# Patient Record
Sex: FEMALE | Race: WHITE | NOT HISPANIC OR LATINO | Employment: OTHER | URBAN - METROPOLITAN AREA
[De-identification: names, ages, dates, MRNs, and addresses within clinical notes are randomized per-mention and may not be internally consistent; named-entity substitution may affect disease eponyms.]

---

## 2019-09-12 ENCOUNTER — APPOINTMENT (OUTPATIENT)
Dept: WOMENS IMAGING | Facility: HOSPITAL | Age: 49
End: 2019-09-12

## 2019-09-12 PROCEDURE — 77063 BREAST TOMOSYNTHESIS BI: CPT | Performed by: RADIOLOGY

## 2019-09-12 PROCEDURE — 77067 SCR MAMMO BI INCL CAD: CPT | Performed by: RADIOLOGY

## 2019-09-26 ENCOUNTER — APPOINTMENT (OUTPATIENT)
Dept: WOMENS IMAGING | Facility: HOSPITAL | Age: 49
End: 2019-09-26

## 2019-09-26 PROCEDURE — 77061 BREAST TOMOSYNTHESIS UNI: CPT | Performed by: RADIOLOGY

## 2019-09-26 PROCEDURE — 76641 ULTRASOUND BREAST COMPLETE: CPT | Performed by: RADIOLOGY

## 2019-09-26 PROCEDURE — 77065 DX MAMMO INCL CAD UNI: CPT | Performed by: RADIOLOGY

## 2019-11-04 ENCOUNTER — APPOINTMENT (OUTPATIENT)
Dept: PREADMISSION TESTING | Facility: HOSPITAL | Age: 49
End: 2019-11-04

## 2019-11-04 VITALS
OXYGEN SATURATION: 97 % | SYSTOLIC BLOOD PRESSURE: 165 MMHG | RESPIRATION RATE: 16 BRPM | BODY MASS INDEX: 25.4 KG/M2 | HEART RATE: 100 BPM | DIASTOLIC BLOOD PRESSURE: 104 MMHG | HEIGHT: 62 IN | TEMPERATURE: 98.2 F | WEIGHT: 138 LBS

## 2019-11-04 LAB
ABO GROUP BLD: NORMAL
ANION GAP SERPL CALCULATED.3IONS-SCNC: 16 MMOL/L (ref 5–15)
B-HCG UR QL: NEGATIVE
BASOPHILS # BLD AUTO: 0.03 10*3/MM3 (ref 0–0.2)
BASOPHILS NFR BLD AUTO: 0.3 % (ref 0–1.5)
BLD GP AB SCN SERPL QL: NEGATIVE
BUN BLD-MCNC: 10 MG/DL (ref 6–20)
BUN/CREAT SERPL: 15.2 (ref 7–25)
CALCIUM SPEC-SCNC: 9 MG/DL (ref 8.6–10.5)
CHLORIDE SERPL-SCNC: 103 MMOL/L (ref 98–107)
CO2 SERPL-SCNC: 24 MMOL/L (ref 22–29)
CREAT BLD-MCNC: 0.66 MG/DL (ref 0.57–1)
DEPRECATED RDW RBC AUTO: 45 FL (ref 37–54)
EOSINOPHIL # BLD AUTO: 0.2 10*3/MM3 (ref 0–0.4)
EOSINOPHIL NFR BLD AUTO: 2.2 % (ref 0.3–6.2)
ERYTHROCYTE [DISTWIDTH] IN BLOOD BY AUTOMATED COUNT: 12.6 % (ref 12.3–15.4)
GFR SERPL CREATININE-BSD FRML MDRD: 95 ML/MIN/1.73
GLUCOSE BLD-MCNC: 96 MG/DL (ref 65–99)
HCT VFR BLD AUTO: 43 % (ref 34–46.6)
HGB BLD-MCNC: 14.4 G/DL (ref 12–15.9)
IMM GRANULOCYTES # BLD AUTO: 0.03 10*3/MM3 (ref 0–0.05)
IMM GRANULOCYTES NFR BLD AUTO: 0.3 % (ref 0–0.5)
LYMPHOCYTES # BLD AUTO: 2.43 10*3/MM3 (ref 0.7–3.1)
LYMPHOCYTES NFR BLD AUTO: 26.7 % (ref 19.6–45.3)
MCH RBC QN AUTO: 32.4 PG (ref 26.6–33)
MCHC RBC AUTO-ENTMCNC: 33.5 G/DL (ref 31.5–35.7)
MCV RBC AUTO: 96.6 FL (ref 79–97)
MONOCYTES # BLD AUTO: 0.54 10*3/MM3 (ref 0.1–0.9)
MONOCYTES NFR BLD AUTO: 5.9 % (ref 5–12)
NEUTROPHILS # BLD AUTO: 5.88 10*3/MM3 (ref 1.7–7)
NEUTROPHILS NFR BLD AUTO: 64.6 % (ref 42.7–76)
NRBC BLD AUTO-RTO: 0 /100 WBC (ref 0–0.2)
PLATELET # BLD AUTO: 281 10*3/MM3 (ref 140–450)
PMV BLD AUTO: 10 FL (ref 6–12)
POTASSIUM BLD-SCNC: 4 MMOL/L (ref 3.5–5.2)
RBC # BLD AUTO: 4.45 10*6/MM3 (ref 3.77–5.28)
RH BLD: POSITIVE
SODIUM BLD-SCNC: 143 MMOL/L (ref 136–145)
T&S EXPIRATION DATE: NORMAL
WBC NRBC COR # BLD: 9.11 10*3/MM3 (ref 3.4–10.8)

## 2019-11-04 PROCEDURE — 36415 COLL VENOUS BLD VENIPUNCTURE: CPT

## 2019-11-04 PROCEDURE — 93005 ELECTROCARDIOGRAM TRACING: CPT

## 2019-11-04 PROCEDURE — 93010 ELECTROCARDIOGRAM REPORT: CPT | Performed by: INTERNAL MEDICINE

## 2019-11-04 PROCEDURE — 86850 RBC ANTIBODY SCREEN: CPT | Performed by: OBSTETRICS & GYNECOLOGY

## 2019-11-04 PROCEDURE — 80048 BASIC METABOLIC PNL TOTAL CA: CPT | Performed by: OBSTETRICS & GYNECOLOGY

## 2019-11-04 PROCEDURE — 86901 BLOOD TYPING SEROLOGIC RH(D): CPT | Performed by: OBSTETRICS & GYNECOLOGY

## 2019-11-04 PROCEDURE — 86900 BLOOD TYPING SEROLOGIC ABO: CPT | Performed by: OBSTETRICS & GYNECOLOGY

## 2019-11-04 PROCEDURE — 85025 COMPLETE CBC W/AUTO DIFF WBC: CPT | Performed by: OBSTETRICS & GYNECOLOGY

## 2019-11-04 PROCEDURE — 81025 URINE PREGNANCY TEST: CPT | Performed by: OBSTETRICS & GYNECOLOGY

## 2019-11-04 RX ORDER — ACETAMINOPHEN 500 MG
500-1000 TABLET ORAL EVERY 6 HOURS PRN
COMMUNITY
End: 2019-11-12 | Stop reason: HOSPADM

## 2019-11-04 RX ORDER — IBUPROFEN 200 MG
200-400 TABLET ORAL EVERY 6 HOURS PRN
COMMUNITY
End: 2020-04-24 | Stop reason: ALTCHOICE

## 2019-11-04 NOTE — DISCHARGE INSTRUCTIONS
Take the following medications the morning of surgery with a small sip of water: NONE        General Instructions:  • Do not eat solid food after midnight the night before surgery.  • You may drink clear liquids day of surgery but must stop at least one hour before your hospital arrival time.  • It is beneficial for you to have a clear drink that contains carbohydrates the day of surgery.  We suggest a 12 to 20 ounce bottle of Gatorade or Powerade for non-diabetic patients or a 12 to 20 ounce bottle of G2 or Powerade Zero for diabetic patients.     Clear liquids are liquids you can see through.  Nothing red in color.     Plain water                               Sports drinks  Sodas                                   Gelatin (Jell-O)  Fruit juices without pulp such as white grape juice and apple juice  Popsicles that contain no fruit or yogurt  Tea or coffee (no cream or milk added)  Gatorade / Powerade  G2 / Powerade Zero    • Bring any papers given to you in the doctor’s office.  • Wear clean comfortable clothes.  • Do not wear contact lenses, false eyelashes or make-up.  Bring a case for your glasses.   • Remove all piercings.  Leave jewelry and any other valuables at home.  • The Pre-Admission Testing nurse will instruct you to bring medications if unable to obtain an accurate list in Pre-Admission Testing.        If you were given a blood bank ID arm band remember to bring it with you the day of surgery.    Preventing a Surgical Site Infection:  • For 2 to 3 days before surgery, avoid shaving with a razor because the razor can irritate skin and make it easier to develop an infection.    • Any areas of open skin can increase the risk of a post-operative wound infection by allowing bacteria to enter and travel throughout the body.  Notify your surgeon if you have any skin wounds / rashes even if it is not near the expected surgical site.  The area will need assessed to determine if surgery should be delayed until  it is healed.  • The night prior to surgery sleep in a clean bed with clean clothing.  Do not allow pets to sleep with you.  • Shower on the morning of surgery using a fresh bar of anti-bacterial soap (such as Dial) and clean washcloth.  Dry with a clean towel and dress in clean clothing.  • Ask your surgeon if you will be receiving antibiotics prior to surgery.  • Make sure you, your family, and all healthcare providers clean their hands with soap and water or an alcohol based hand  before caring for you or your wound.    Day of surgery: 11/11/2019. MAIN OR. ARRIVAL TIME 530 AM  Your arrival time is approximately two hours before your scheduled surgery time.  Upon arrival, a Pre-op nurse and Anesthesiologist will review your health history, obtain vital signs, and answer questions you may have.  The only belongings needed at this time will be a list of your home medications and if applicable your C-PAP/BI-PAP machine.  If you are staying overnight your family can leave the rest of your belongings in the car and bring them to your room later.  A Pre-op nurse will start an IV and you may receive medication in preparation for surgery, including something to help you relax.  Your family will be able to see you in the Pre-op area.  Two visitors at a time will be allowed in the Pre-op room.  While you are in surgery your family should notify the waiting room  if they leave the waiting room area and provide a contact phone number.    Please be aware that surgery does come with discomfort.  We want to make every effort to control your discomfort so please discuss any uncontrolled symptoms with your nurse.   Your doctor will most likely have prescribed pain medications.          If you are staying overnight following surgery, you will be transported to your hospital room following the recovery period.  [facility] has all private rooms.    You have received a list of surgical assistants  for your reference.  If you have any questions please call Pre-Admission Testing at [phone].  Deductibles and co-payments are collected on the day of service. Please be prepared to pay the required co-pay, deductible or deposit on the day of service as defined by your plan.

## 2019-11-08 RX ORDER — PHENAZOPYRIDINE HYDROCHLORIDE 200 MG/1
200 TABLET, FILM COATED ORAL
Status: DISCONTINUED | OUTPATIENT
Start: 2019-11-08 | End: 2019-11-12 | Stop reason: HOSPADM

## 2019-11-08 RX ORDER — CEFAZOLIN SODIUM 2 G/100ML
2 INJECTION, SOLUTION INTRAVENOUS
Status: COMPLETED | OUTPATIENT
Start: 2019-11-11 | End: 2019-11-11

## 2019-11-08 RX ORDER — ACETAMINOPHEN 500 MG
1000 TABLET ORAL
Status: COMPLETED | OUTPATIENT
Start: 2019-11-08 | End: 2019-11-11

## 2019-11-08 RX ORDER — GABAPENTIN 300 MG/1
600 CAPSULE ORAL
Status: COMPLETED | OUTPATIENT
Start: 2019-11-08 | End: 2019-11-11

## 2019-11-08 RX ORDER — CELECOXIB 200 MG/1
400 CAPSULE ORAL
Status: COMPLETED | OUTPATIENT
Start: 2019-11-08 | End: 2019-11-11

## 2019-11-08 NOTE — H&P
Macie is admitted for a LAVH with bilateral salpingectomy.  She is a 48-year-old woman with a history of very heavy periods with dysmenorrhea.  She will flow through her tampons.  She also has constipation.  Her uterus is enlarged at 10 weeks size.  She is admitted for surgery.  She has a lot of pressure in her pelvis and would prefer a hysterectomy as opposed to ablation or any other more conservative therapies.  She has been having fatigue.    Past medical history is significant for GERD and hypothyroidism.    Allergies none    Social history she does not smoke she socially uses alcohol only, no history of drug use.  She is single.  She works outside the home.    Medications in P thyroid 60 mg daily, magnesium daily, vitamin D, DHEA,    Family history negative for pertinent family history    Pregnancy history  1 para 1 vaginal delivery 1.    Past medical history is negative.    Review of systems is significant for heavy menses, her cycles are regular, dysmenorrhea with her.    On physical exam weight 142.  Height 5 foot three-quarter inch, BMI is 27.05.  Her blood pressure is 120/82.    Head eyes ears nose and throat are normal    Lungs clear with normal lung bases    Heart regular rate and rhythm with no extra sounds or murmurs    Abdomen soft flat nontender there are no palpable masses.    Vulva is normal.  Cervix is pink.  On bimanual uterus is 10 weeks in size.  It is mobile.  Her rectal exam is negative.    Assessment 10 weeks size uterus.  There are multiple fibroids.  The total uterine weight is estimated 215 g.  She is been having a lot of pelvic pressure and has to void more frequently.  She would like to proceed with a LAVH and bilateral salpingectomy.  The risk of surgery including infection bleeding, damage to bowel or bladder, the 4% risk of conversion to a laparotomy for hysterectomy completion was reviewed.  The risk of DVT was also reviewed.  All questions have been answered and she would  like to proceed with the surgery.

## 2019-11-11 ENCOUNTER — ANESTHESIA (OUTPATIENT)
Dept: PERIOP | Facility: HOSPITAL | Age: 49
End: 2019-11-11

## 2019-11-11 ENCOUNTER — ANESTHESIA EVENT (OUTPATIENT)
Dept: PERIOP | Facility: HOSPITAL | Age: 49
End: 2019-11-11

## 2019-11-11 ENCOUNTER — HOSPITAL ENCOUNTER (OUTPATIENT)
Facility: HOSPITAL | Age: 49
Discharge: HOME | End: 2019-11-12
Attending: OBSTETRICS & GYNECOLOGY | Admitting: OBSTETRICS & GYNECOLOGY

## 2019-11-11 DIAGNOSIS — D21.9 FIBROIDS: ICD-10-CM

## 2019-11-11 PROBLEM — N92.0 MENORRHAGIA WITH REGULAR CYCLE: Status: ACTIVE | Noted: 2019-11-11

## 2019-11-11 LAB
B-HCG UR QL: NEGATIVE
INTERNAL NEGATIVE CONTROL: NEGATIVE
INTERNAL POSITIVE CONTROL: POSITIVE
Lab: NORMAL

## 2019-11-11 PROCEDURE — 25010000002 MIDAZOLAM PER 1 MG: Performed by: ANESTHESIOLOGY

## 2019-11-11 PROCEDURE — 25010000002 ONDANSETRON PER 1 MG: Performed by: NURSE ANESTHETIST, CERTIFIED REGISTERED

## 2019-11-11 PROCEDURE — 25010000002 NEOSTIGMINE PER 0.5 MG: Performed by: NURSE ANESTHETIST, CERTIFIED REGISTERED

## 2019-11-11 PROCEDURE — 25010000002 ROPIVACAINE PER 1 MG: Performed by: OBSTETRICS & GYNECOLOGY

## 2019-11-11 PROCEDURE — 25010000002 DEXAMETHASONE PER 1 MG: Performed by: NURSE ANESTHETIST, CERTIFIED REGISTERED

## 2019-11-11 PROCEDURE — 25010000002 PROPOFOL 10 MG/ML EMULSION: Performed by: NURSE ANESTHETIST, CERTIFIED REGISTERED

## 2019-11-11 PROCEDURE — 88307 TISSUE EXAM BY PATHOLOGIST: CPT | Performed by: OBSTETRICS & GYNECOLOGY

## 2019-11-11 PROCEDURE — 81025 URINE PREGNANCY TEST: CPT | Performed by: ANESTHESIOLOGY

## 2019-11-11 PROCEDURE — 25010000003 CEFAZOLIN IN DEXTROSE 2-4 GM/100ML-% SOLUTION: Performed by: OBSTETRICS & GYNECOLOGY

## 2019-11-11 PROCEDURE — 25010000002 FENTANYL CITRATE (PF) 100 MCG/2ML SOLUTION: Performed by: NURSE ANESTHETIST, CERTIFIED REGISTERED

## 2019-11-11 RX ORDER — SODIUM CHLORIDE, SODIUM LACTATE, POTASSIUM CHLORIDE, CALCIUM CHLORIDE 600; 310; 30; 20 MG/100ML; MG/100ML; MG/100ML; MG/100ML
40 INJECTION, SOLUTION INTRAVENOUS CONTINUOUS
Status: DISCONTINUED | OUTPATIENT
Start: 2019-11-11 | End: 2019-11-12 | Stop reason: HOSPADM

## 2019-11-11 RX ORDER — PROMETHAZINE HYDROCHLORIDE 25 MG/1
12.5 SUPPOSITORY RECTAL EVERY 6 HOURS PRN
Status: DISCONTINUED | OUTPATIENT
Start: 2019-11-11 | End: 2019-11-12 | Stop reason: HOSPADM

## 2019-11-11 RX ORDER — LEVOTHYROXINE AND LIOTHYRONINE 38; 9 UG/1; UG/1
60 TABLET ORAL DAILY
COMMUNITY
End: 2019-11-25 | Stop reason: SDUPTHER

## 2019-11-11 RX ORDER — ONDANSETRON 2 MG/ML
INJECTION INTRAMUSCULAR; INTRAVENOUS AS NEEDED
Status: DISCONTINUED | OUTPATIENT
Start: 2019-11-11 | End: 2019-11-11 | Stop reason: SURG

## 2019-11-11 RX ORDER — FLUMAZENIL 0.1 MG/ML
0.2 INJECTION INTRAVENOUS AS NEEDED
Status: DISCONTINUED | OUTPATIENT
Start: 2019-11-11 | End: 2019-11-11 | Stop reason: HOSPADM

## 2019-11-11 RX ORDER — HYDROCODONE BITARTRATE AND ACETAMINOPHEN 7.5; 325 MG/1; MG/1
1 TABLET ORAL ONCE AS NEEDED
Status: DISCONTINUED | OUTPATIENT
Start: 2019-11-11 | End: 2019-11-11 | Stop reason: HOSPADM

## 2019-11-11 RX ORDER — ONDANSETRON 4 MG/1
4 TABLET, FILM COATED ORAL EVERY 6 HOURS PRN
Status: DISCONTINUED | OUTPATIENT
Start: 2019-11-11 | End: 2019-11-12 | Stop reason: HOSPADM

## 2019-11-11 RX ORDER — MIDAZOLAM HYDROCHLORIDE 1 MG/ML
1 INJECTION INTRAMUSCULAR; INTRAVENOUS
Status: DISCONTINUED | OUTPATIENT
Start: 2019-11-11 | End: 2019-11-11 | Stop reason: HOSPADM

## 2019-11-11 RX ORDER — FAMOTIDINE 10 MG/ML
20 INJECTION, SOLUTION INTRAVENOUS ONCE
Status: COMPLETED | OUTPATIENT
Start: 2019-11-11 | End: 2019-11-11

## 2019-11-11 RX ORDER — MAGNESIUM HYDROXIDE 1200 MG/15ML
LIQUID ORAL AS NEEDED
Status: DISCONTINUED | OUTPATIENT
Start: 2019-11-11 | End: 2019-11-11 | Stop reason: HOSPADM

## 2019-11-11 RX ORDER — OXYCODONE AND ACETAMINOPHEN 7.5; 325 MG/1; MG/1
1 TABLET ORAL ONCE AS NEEDED
Status: DISCONTINUED | OUTPATIENT
Start: 2019-11-11 | End: 2019-11-11 | Stop reason: HOSPADM

## 2019-11-11 RX ORDER — PROMETHAZINE HYDROCHLORIDE 25 MG/1
25 SUPPOSITORY RECTAL ONCE AS NEEDED
Status: DISCONTINUED | OUTPATIENT
Start: 2019-11-11 | End: 2019-11-11 | Stop reason: HOSPADM

## 2019-11-11 RX ORDER — ONDANSETRON 2 MG/ML
4 INJECTION INTRAMUSCULAR; INTRAVENOUS ONCE AS NEEDED
Status: DISCONTINUED | OUTPATIENT
Start: 2019-11-11 | End: 2019-11-11 | Stop reason: HOSPADM

## 2019-11-11 RX ORDER — PROPOFOL 10 MG/ML
VIAL (ML) INTRAVENOUS AS NEEDED
Status: DISCONTINUED | OUTPATIENT
Start: 2019-11-11 | End: 2019-11-11 | Stop reason: SURG

## 2019-11-11 RX ORDER — MIDAZOLAM HYDROCHLORIDE 1 MG/ML
2 INJECTION INTRAMUSCULAR; INTRAVENOUS
Status: DISCONTINUED | OUTPATIENT
Start: 2019-11-11 | End: 2019-11-11 | Stop reason: HOSPADM

## 2019-11-11 RX ORDER — EPHEDRINE SULFATE 50 MG/ML
INJECTION, SOLUTION INTRAVENOUS AS NEEDED
Status: DISCONTINUED | OUTPATIENT
Start: 2019-11-11 | End: 2019-11-11 | Stop reason: SURG

## 2019-11-11 RX ORDER — ONDANSETRON 2 MG/ML
4 INJECTION INTRAMUSCULAR; INTRAVENOUS EVERY 6 HOURS PRN
Status: DISCONTINUED | OUTPATIENT
Start: 2019-11-11 | End: 2019-11-12 | Stop reason: HOSPADM

## 2019-11-11 RX ORDER — ROCURONIUM BROMIDE 10 MG/ML
INJECTION, SOLUTION INTRAVENOUS AS NEEDED
Status: DISCONTINUED | OUTPATIENT
Start: 2019-11-11 | End: 2019-11-11 | Stop reason: SURG

## 2019-11-11 RX ORDER — LABETALOL HYDROCHLORIDE 5 MG/ML
5 INJECTION, SOLUTION INTRAVENOUS
Status: DISCONTINUED | OUTPATIENT
Start: 2019-11-11 | End: 2019-11-11 | Stop reason: HOSPADM

## 2019-11-11 RX ORDER — EPHEDRINE SULFATE 50 MG/ML
5 INJECTION, SOLUTION INTRAVENOUS ONCE AS NEEDED
Status: DISCONTINUED | OUTPATIENT
Start: 2019-11-11 | End: 2019-11-11 | Stop reason: HOSPADM

## 2019-11-11 RX ORDER — FENTANYL CITRATE 50 UG/ML
INJECTION, SOLUTION INTRAMUSCULAR; INTRAVENOUS AS NEEDED
Status: DISCONTINUED | OUTPATIENT
Start: 2019-11-11 | End: 2019-11-11 | Stop reason: SURG

## 2019-11-11 RX ORDER — CELECOXIB 200 MG/1
400 CAPSULE ORAL EVERY 12 HOURS SCHEDULED
Status: DISCONTINUED | OUTPATIENT
Start: 2019-11-11 | End: 2019-11-12

## 2019-11-11 RX ORDER — FENTANYL CITRATE 50 UG/ML
50 INJECTION, SOLUTION INTRAMUSCULAR; INTRAVENOUS
Status: DISCONTINUED | OUTPATIENT
Start: 2019-11-11 | End: 2019-11-11 | Stop reason: HOSPADM

## 2019-11-11 RX ORDER — LIDOCAINE HYDROCHLORIDE AND EPINEPHRINE 10; 10 MG/ML; UG/ML
INJECTION, SOLUTION INFILTRATION; PERINEURAL AS NEEDED
Status: DISCONTINUED | OUTPATIENT
Start: 2019-11-11 | End: 2019-11-11 | Stop reason: HOSPADM

## 2019-11-11 RX ORDER — LIDOCAINE HYDROCHLORIDE 10 MG/ML
0.5 INJECTION, SOLUTION EPIDURAL; INFILTRATION; INTRACAUDAL; PERINEURAL ONCE AS NEEDED
Status: DISCONTINUED | OUTPATIENT
Start: 2019-11-11 | End: 2019-11-11 | Stop reason: HOSPADM

## 2019-11-11 RX ORDER — PROMETHAZINE HYDROCHLORIDE 25 MG/1
25 TABLET ORAL EVERY 6 HOURS PRN
Status: DISCONTINUED | OUTPATIENT
Start: 2019-11-11 | End: 2019-11-12 | Stop reason: HOSPADM

## 2019-11-11 RX ORDER — NALOXONE HCL 0.4 MG/ML
0.2 VIAL (ML) INJECTION AS NEEDED
Status: DISCONTINUED | OUTPATIENT
Start: 2019-11-11 | End: 2019-11-11 | Stop reason: HOSPADM

## 2019-11-11 RX ORDER — DIPHENHYDRAMINE HCL 25 MG
25 CAPSULE ORAL
Status: DISCONTINUED | OUTPATIENT
Start: 2019-11-11 | End: 2019-11-11 | Stop reason: HOSPADM

## 2019-11-11 RX ORDER — SODIUM CHLORIDE 0.9 % (FLUSH) 0.9 %
3-10 SYRINGE (ML) INJECTION AS NEEDED
Status: DISCONTINUED | OUTPATIENT
Start: 2019-11-11 | End: 2019-11-11 | Stop reason: HOSPADM

## 2019-11-11 RX ORDER — SODIUM CHLORIDE 9 MG/ML
INJECTION, SOLUTION INTRAVENOUS AS NEEDED
Status: DISCONTINUED | OUTPATIENT
Start: 2019-11-11 | End: 2019-11-11 | Stop reason: HOSPADM

## 2019-11-11 RX ORDER — PROMETHAZINE HYDROCHLORIDE 25 MG/ML
25 INJECTION, SOLUTION INTRAMUSCULAR; INTRAVENOUS EVERY 4 HOURS PRN
Status: DISCONTINUED | OUTPATIENT
Start: 2019-11-11 | End: 2019-11-12 | Stop reason: HOSPADM

## 2019-11-11 RX ORDER — ROPIVACAINE HYDROCHLORIDE 5 MG/ML
INJECTION, SOLUTION EPIDURAL; INFILTRATION; PERINEURAL AS NEEDED
Status: DISCONTINUED | OUTPATIENT
Start: 2019-11-11 | End: 2019-11-11 | Stop reason: HOSPADM

## 2019-11-11 RX ORDER — OXYCODONE HYDROCHLORIDE 5 MG/1
5 TABLET ORAL EVERY 4 HOURS PRN
Status: DISCONTINUED | OUTPATIENT
Start: 2019-11-11 | End: 2019-11-12 | Stop reason: HOSPADM

## 2019-11-11 RX ORDER — ACETAMINOPHEN 325 MG/1
650 TABLET ORAL ONCE AS NEEDED
Status: DISCONTINUED | OUTPATIENT
Start: 2019-11-11 | End: 2019-11-11 | Stop reason: HOSPADM

## 2019-11-11 RX ORDER — GABAPENTIN 300 MG/1
600 CAPSULE ORAL 3 TIMES DAILY
Status: DISCONTINUED | OUTPATIENT
Start: 2019-11-11 | End: 2019-11-12 | Stop reason: HOSPADM

## 2019-11-11 RX ORDER — PROMETHAZINE HYDROCHLORIDE 25 MG/1
25 TABLET ORAL ONCE AS NEEDED
Status: DISCONTINUED | OUTPATIENT
Start: 2019-11-11 | End: 2019-11-11 | Stop reason: HOSPADM

## 2019-11-11 RX ORDER — SCOLOPAMINE TRANSDERMAL SYSTEM 1 MG/1
1 PATCH, EXTENDED RELEASE TRANSDERMAL
Status: DISCONTINUED | OUTPATIENT
Start: 2019-11-11 | End: 2019-11-11 | Stop reason: HOSPADM

## 2019-11-11 RX ORDER — DOCUSATE SODIUM 100 MG/1
100 CAPSULE, LIQUID FILLED ORAL 2 TIMES DAILY
Status: DISCONTINUED | OUTPATIENT
Start: 2019-11-11 | End: 2019-11-12 | Stop reason: SDUPTHER

## 2019-11-11 RX ORDER — NALOXONE HCL 0.4 MG/ML
0.4 VIAL (ML) INJECTION
Status: DISCONTINUED | OUTPATIENT
Start: 2019-11-11 | End: 2019-11-12 | Stop reason: HOSPADM

## 2019-11-11 RX ORDER — LIDOCAINE HYDROCHLORIDE 20 MG/ML
INJECTION, SOLUTION INFILTRATION; PERINEURAL AS NEEDED
Status: DISCONTINUED | OUTPATIENT
Start: 2019-11-11 | End: 2019-11-11 | Stop reason: SURG

## 2019-11-11 RX ORDER — HYDRALAZINE HYDROCHLORIDE 20 MG/ML
5 INJECTION INTRAMUSCULAR; INTRAVENOUS
Status: DISCONTINUED | OUTPATIENT
Start: 2019-11-11 | End: 2019-11-11 | Stop reason: HOSPADM

## 2019-11-11 RX ORDER — DIPHENHYDRAMINE HYDROCHLORIDE 50 MG/ML
12.5 INJECTION INTRAMUSCULAR; INTRAVENOUS
Status: DISCONTINUED | OUTPATIENT
Start: 2019-11-11 | End: 2019-11-11 | Stop reason: HOSPADM

## 2019-11-11 RX ORDER — SODIUM CHLORIDE, SODIUM LACTATE, POTASSIUM CHLORIDE, CALCIUM CHLORIDE 600; 310; 30; 20 MG/100ML; MG/100ML; MG/100ML; MG/100ML
9 INJECTION, SOLUTION INTRAVENOUS CONTINUOUS
Status: DISCONTINUED | OUTPATIENT
Start: 2019-11-11 | End: 2019-11-12 | Stop reason: HOSPADM

## 2019-11-11 RX ORDER — PROMETHAZINE HYDROCHLORIDE 25 MG/ML
12.5 INJECTION, SOLUTION INTRAMUSCULAR; INTRAVENOUS ONCE AS NEEDED
Status: DISCONTINUED | OUTPATIENT
Start: 2019-11-11 | End: 2019-11-11 | Stop reason: HOSPADM

## 2019-11-11 RX ORDER — DEXAMETHASONE SODIUM PHOSPHATE 10 MG/ML
INJECTION INTRAMUSCULAR; INTRAVENOUS AS NEEDED
Status: DISCONTINUED | OUTPATIENT
Start: 2019-11-11 | End: 2019-11-11 | Stop reason: SURG

## 2019-11-11 RX ORDER — ACETAMINOPHEN 500 MG
1000 TABLET ORAL 4 TIMES DAILY
Status: COMPLETED | OUTPATIENT
Start: 2019-11-11 | End: 2019-11-11

## 2019-11-11 RX ORDER — GLYCOPYRROLATE 0.2 MG/ML
INJECTION INTRAMUSCULAR; INTRAVENOUS AS NEEDED
Status: DISCONTINUED | OUTPATIENT
Start: 2019-11-11 | End: 2019-11-11 | Stop reason: SURG

## 2019-11-11 RX ORDER — SODIUM CHLORIDE 0.9 % (FLUSH) 0.9 %
3 SYRINGE (ML) INJECTION EVERY 12 HOURS SCHEDULED
Status: DISCONTINUED | OUTPATIENT
Start: 2019-11-11 | End: 2019-11-11 | Stop reason: HOSPADM

## 2019-11-11 RX ORDER — PROMETHAZINE HYDROCHLORIDE 25 MG/ML
6.25 INJECTION, SOLUTION INTRAMUSCULAR; INTRAVENOUS EVERY 4 HOURS PRN
Status: DISCONTINUED | OUTPATIENT
Start: 2019-11-11 | End: 2019-11-12 | Stop reason: HOSPADM

## 2019-11-11 RX ORDER — PROMETHAZINE HYDROCHLORIDE 25 MG/ML
6.25 INJECTION, SOLUTION INTRAMUSCULAR; INTRAVENOUS
Status: DISCONTINUED | OUTPATIENT
Start: 2019-11-11 | End: 2019-11-11 | Stop reason: HOSPADM

## 2019-11-11 RX ORDER — HYDROMORPHONE HYDROCHLORIDE 1 MG/ML
0.5 INJECTION, SOLUTION INTRAMUSCULAR; INTRAVENOUS; SUBCUTANEOUS
Status: DISCONTINUED | OUTPATIENT
Start: 2019-11-11 | End: 2019-11-11 | Stop reason: HOSPADM

## 2019-11-11 RX ORDER — HYDROMORPHONE HYDROCHLORIDE 1 MG/ML
0.5 INJECTION, SOLUTION INTRAMUSCULAR; INTRAVENOUS; SUBCUTANEOUS
Status: DISCONTINUED | OUTPATIENT
Start: 2019-11-11 | End: 2019-11-12 | Stop reason: HOSPADM

## 2019-11-11 RX ORDER — ZOLPIDEM TARTRATE 5 MG/1
5 TABLET ORAL NIGHTLY PRN
Status: DISCONTINUED | OUTPATIENT
Start: 2019-11-11 | End: 2019-11-12 | Stop reason: HOSPADM

## 2019-11-11 RX ADMIN — DOCUSATE SODIUM 100 MG: 100 CAPSULE, LIQUID FILLED ORAL at 20:42

## 2019-11-11 RX ADMIN — CEFAZOLIN SODIUM 2 G: 2 INJECTION, SOLUTION INTRAVENOUS at 07:42

## 2019-11-11 RX ADMIN — GABAPENTIN 600 MG: 300 CAPSULE ORAL at 06:28

## 2019-11-11 RX ADMIN — PROPOFOL 200 MG: 10 INJECTION, EMULSION INTRAVENOUS at 07:38

## 2019-11-11 RX ADMIN — CELECOXIB 400 MG: 200 CAPSULE ORAL at 06:28

## 2019-11-11 RX ADMIN — ROCURONIUM BROMIDE 50 MG: 10 INJECTION INTRAVENOUS at 07:38

## 2019-11-11 RX ADMIN — ACETAMINOPHEN 1000 MG: 500 TABLET, FILM COATED ORAL at 06:28

## 2019-11-11 RX ADMIN — FENTANYL CITRATE 100 MCG: 50 INJECTION, SOLUTION INTRAMUSCULAR; INTRAVENOUS at 07:38

## 2019-11-11 RX ADMIN — SODIUM CHLORIDE, POTASSIUM CHLORIDE, SODIUM LACTATE AND CALCIUM CHLORIDE 9 ML/HR: 600; 310; 30; 20 INJECTION, SOLUTION INTRAVENOUS at 06:52

## 2019-11-11 RX ADMIN — DEXAMETHASONE SODIUM PHOSPHATE 8 MG: 10 INJECTION INTRAMUSCULAR; INTRAVENOUS at 07:55

## 2019-11-11 RX ADMIN — FENTANYL CITRATE 50 MCG: 50 INJECTION, SOLUTION INTRAMUSCULAR; INTRAVENOUS at 08:15

## 2019-11-11 RX ADMIN — NEOSTIGMINE METHYLSULFATE 3 MG: 1 INJECTION INTRAMUSCULAR; INTRAVENOUS; SUBCUTANEOUS at 09:03

## 2019-11-11 RX ADMIN — MIDAZOLAM 1 MG: 1 INJECTION INTRAMUSCULAR; INTRAVENOUS at 06:51

## 2019-11-11 RX ADMIN — GABAPENTIN 600 MG: 300 CAPSULE ORAL at 13:02

## 2019-11-11 RX ADMIN — ONDANSETRON 4 MG: 2 INJECTION INTRAMUSCULAR; INTRAVENOUS at 08:16

## 2019-11-11 RX ADMIN — OXYCODONE HYDROCHLORIDE 5 MG: 5 TABLET ORAL at 18:28

## 2019-11-11 RX ADMIN — GABAPENTIN 600 MG: 300 CAPSULE ORAL at 20:42

## 2019-11-11 RX ADMIN — PHENAZOPYRIDINE 200 MG: 200 TABLET ORAL at 06:28

## 2019-11-11 RX ADMIN — GLYCOPYRROLATE 0.4 MG: 0.2 INJECTION INTRAMUSCULAR; INTRAVENOUS at 09:03

## 2019-11-11 RX ADMIN — CELECOXIB 400 MG: 200 CAPSULE ORAL at 20:42

## 2019-11-11 RX ADMIN — OXYCODONE HYDROCHLORIDE 5 MG: 5 TABLET ORAL at 22:35

## 2019-11-11 RX ADMIN — ACETAMINOPHEN 1000 MG: 500 TABLET, FILM COATED ORAL at 13:02

## 2019-11-11 RX ADMIN — SODIUM CHLORIDE, POTASSIUM CHLORIDE, SODIUM LACTATE AND CALCIUM CHLORIDE: 600; 310; 30; 20 INJECTION, SOLUTION INTRAVENOUS at 08:45

## 2019-11-11 RX ADMIN — EPHEDRINE SULFATE 20 MG: 50 INJECTION INTRAMUSCULAR; INTRAVENOUS; SUBCUTANEOUS at 08:05

## 2019-11-11 RX ADMIN — LIDOCAINE HYDROCHLORIDE 60 MG: 20 INJECTION, SOLUTION INFILTRATION; PERINEURAL at 07:38

## 2019-11-11 RX ADMIN — SCOPALAMINE 1 PATCH: 1 PATCH, EXTENDED RELEASE TRANSDERMAL at 06:51

## 2019-11-11 RX ADMIN — FENTANYL CITRATE 50 MCG: 50 INJECTION, SOLUTION INTRAMUSCULAR; INTRAVENOUS at 07:39

## 2019-11-11 RX ADMIN — FENTANYL CITRATE 50 MCG: 50 INJECTION, SOLUTION INTRAMUSCULAR; INTRAVENOUS at 08:33

## 2019-11-11 RX ADMIN — DOCUSATE SODIUM 100 MG: 100 CAPSULE, LIQUID FILLED ORAL at 13:02

## 2019-11-11 RX ADMIN — FAMOTIDINE 20 MG: 10 INJECTION INTRAVENOUS at 06:51

## 2019-11-11 NOTE — BRIEF OP NOTE
TOTAL LAPAROSCOPIC HYSTERECTOMY  Progress Note    Macie Sarabia  11/11/2019    Pre-op Diagnosis:   Fibroids and menorrhagia       Post-Op Diagnosis Codes:   same    Procedure/CPT® Codes:      Procedure(s): LAPAROSCOPIC assisted vaginal  HYSTERECTOMY BILATERAL SALPINGECTOMY and cystoscopy     Surgeon(s):  Amanda Santiago MD    Anesthesia: General    Staff:   Circulator: Ginger Gustafson RN  Scrub Person: Anabell Carr  Assistant: Maria Esther Barnes RN    Estimated Blood Loss: 100 mL    Urine Voided: * No values recorded between 11/11/2019  7:30 AM and 11/11/2019  9:04 AM *    Specimens:                Specimens     ID Source Type Tests Collected By Collected At Frozen?      A Uterus with Fallopian Tubes Tissue · TISSUE PATHOLOGY EXAM   Amanda Snatiago MD 11/11/19 0840 No     Description: uterus with fibroids and bilateral fallopian tubes                Drains:   Urethral Catheter Non-latex 16 Fr. (Active)       Findings: uterine fibroids    Complications: none      Amanda Santiago MD     Date: 11/11/2019  Time: 9:11 AM

## 2019-11-11 NOTE — ANESTHESIA PROCEDURE NOTES
Airway  Urgency: elective    Date/Time: 11/11/2019 7:41 AM  Airway not difficult    General Information and Staff    Patient location during procedure: OR  Anesthesiologist: Eduardo Rojas MD  CRNA: Kiara Powers CRNA    Indications and Patient Condition  Indications for airway management: airway protection    Preoxygenated: yes  Mask difficulty assessment: 1 - vent by mask    Final Airway Details  Final airway type: endotracheal airway      Successful airway: ETT  Cuffed: yes   Successful intubation technique: direct laryngoscopy  Facilitating devices/methods: intubating stylet  Endotracheal tube insertion site: oral  Blade: Deleon  Blade size: 2  ETT size (mm): 7.0  Cormack-Lehane Classification: grade I - full view of glottis  Placement verified by: chest auscultation and capnometry   Measured from: lips  ETT/EBT  to lips (cm): 20  Number of attempts at approach: 1  Assessment: lips, teeth, and gum same as pre-op and atraumatic intubation    Additional Comments  Atraumatic, MOP to cuff, BSBE, no change to dentition, secured with tape

## 2019-11-11 NOTE — OP NOTE
PREOPERATIVE DIAGNOSIS: Fibroids and menorrhagia    POSTOPERATIVE DIAGNOSIS: Uterine fibroids    PROCEDURE: LAVH with bilateral salpingectomy and cystoscopy    SURGEON:  Amanda Snatiago MD    SURGICAL ASSISTANT: Brunilda Barnes    FINDINGS: Uterine fibroids    COMPLICATIONS: None    ANESTHESIA: General    BLOOD LOSS: 100 cc    DESCRIPTION OF PROCEDURE: She was brought to the operating room and given general anesthesia.  She was placed in a modified lithotomy position using Hung stirrups.  She was prepped her bladder was emptied and then she was draped.  A speculum was placed in vagina single-tooth tenaculum placed on the cervix.  A Hulka manipulator was inserted.  The tenaculum was removed and the speculum were removed.  I changed my gloves and made a 5 mm incision just beneath the umbilicus after first injecting 5 cc of half percent Naropin.  The abdominal wall was elevated the 5 mm trocar with a 5 mm laparoscope was inserted under direct visualization.  I then remove the center of the trocar and left the sleeve in place.  She was insufflated from a pressure 3 to a pressure of 15 mmHg and placed in Trendelenburg.  The laparoscope was reinserted.  Two 5 mm incisions lateral to the epigastric vessels were made after first injecting half percent Naropin 10 cc and visualizing the needle tip on either side.  We were lateral to the epigastric vessels.  5 mm trochars were inserted into each side.  This was done under direct visualization.  The uterus had multiple small fibroids.  The right fallopian tube was excised with the harmonic on min along the mesosalpinx at the cornua the same was done on the opposite side and both fallopian tubes were removed.  Next the harmonic ConMed was used to clamped cut and coagulate the utero-ovarian ligament and round ligament and then on max the cardinal ligament first on the left side then on the right and then on max taking down the anterior peritoneum overlying the lower uterine  segment.  We then went vaginally.  A speculum was placed in vagina to tenaculum placed on the cervix after removing the Hulka manipulator.  I injected 20 cc of 1% lidocaine with epinephrine around the cervix and made an incision pushing the mucosa toward the patient's head.  The posterior peritoneum was entered without difficulty.  It was marked with a suture of 0 Monocryl and held with a hemostat.  A retractor was placed into the cul-de-sac.  The uterosacral ligaments were clamped on either side the pedicle developed and ligated in Guille fashion with 0 Monocryl.  Next the anterior leaf of the peritoneum was incised and the peritoneal cavity was entered freeing the bladder from the lower uterine segment.  A Kimmie retractor was inserted.  Progressively the cardinal ligament was clamped on either side pedicle developed and ligated in Guille fashion with 0 Monocryl.  The uterus was then removed.  Areas of bleeding on the sidewall of the vaginal vault were sutured on either side with a figure-of-eight suture of 0 Monocryl.  Next reperitonealization was done using 0 Vicryl in a running fashion and the sponge and needle count were correct.  The vaginal vault was closed with a running lock suture of 0 Vicryl there was good hemostasis.  We then revisualized from above and there was no evidence of bleeding.  The gas was allowed to escape the ureters have been identified.  They were free from the operative site.  The gas was allowed to escape.  The 3 trochars were removed and 4-0 Vicryl was used to close the skin in subcuticular fashion and Dermabond applied.  A cystoscopy was performed and urine was seen to effluxed from both of the ureters.  Pyridium was in place.  I did not see any damage to the bladder mucosa using the to visualize this as I looked at the ureters effluxing urine that was Pyridium stained.  Next a Loaiza catheter was reinserted.  She left the operating room in good condition there were no problems or  complications

## 2019-11-11 NOTE — PLAN OF CARE
Problem: Patient Care Overview  Goal: Plan of Care Review  Outcome: Ongoing (interventions implemented as appropriate)   11/11/19 1025   Coping/Psychosocial   Plan of Care Reviewed With patient   Plan of Care Review   Progress improving   OTHER   Outcome Summary VSS. Pain controlled with PO pain meds. Loaiza to BSMILE to be d/c in AM       Problem: Pain, Chronic (Adult)  Goal: Identify Related Risk Factors and Signs and Symptoms  Outcome: Outcome(s) achieved Date Met: 11/11/19

## 2019-11-11 NOTE — ANESTHESIA PREPROCEDURE EVALUATION
Anesthesia Evaluation     Patient summary reviewed and Nursing notes reviewed                Airway   Mallampati: II  Small opening  Dental      Pulmonary - negative pulmonary ROS   Cardiovascular - negative cardio ROS    ECG reviewed  Rhythm: regular  Rate: normal        Neuro/Psych  (+) headaches, dizziness/light headedness,     GI/Hepatic/Renal/Endo    (+)  GERD,      Musculoskeletal (-) negative ROS    Abdominal    Substance History - negative use     OB/GYN negative ob/gyn ROS         Other                        Anesthesia Plan    ASA 2     general     intravenous induction     Anesthetic plan, all risks, benefits, and alternatives have been provided, discussed and informed consent has been obtained with: patient.

## 2019-11-11 NOTE — ANESTHESIA POSTPROCEDURE EVALUATION
"Patient: Macie Sarabia    Procedure Summary     Date:  11/11/19 Room / Location:  [facility] OR [address] [place] [address] MAIN OR    Anesthesia Start:  0732 Anesthesia Stop:  0920    Procedure:  TOTAL LAPAROSCOPIC HYSTERECTOMY BILATERAL SALPINGECTOMY (N/A Abdomen) Diagnosis:      Surgeon:  Amanda Santiago MD Provider:  Eduardo Rojas MD    Anesthesia Type:  general ASA Status:  2          Anesthesia Type: general  Last vitals  BP   132/85 (11/11/19 1000)   Temp   36.8 °C (98.2 °F) (11/11/19 0914)   Pulse   66 (11/11/19 1000)   Resp   12 (11/11/19 1000)     SpO2   100 % (11/11/19 1000)     Post Anesthesia Care and Evaluation    Patient location during evaluation: bedside  Patient participation: complete - patient participated  Level of consciousness: awake and alert  Pain management: adequate  Airway patency: patent  Anesthetic complications: No anesthetic complications    Cardiovascular status: acceptable  Respiratory status: acceptable  Hydration status: acceptable    Comments: /85   Pulse 66   Temp 36.8 °C (98.2 °F) (Oral)   Resp 12   Ht 157.5 cm (62\")   Wt 62.2 kg (137 lb 3.2 oz)   LMP 10/28/2019   SpO2 100%   BMI 25.09 kg/m²       "

## 2019-11-12 VITALS
DIASTOLIC BLOOD PRESSURE: 96 MMHG | HEIGHT: 62 IN | SYSTOLIC BLOOD PRESSURE: 146 MMHG | TEMPERATURE: 98.8 F | OXYGEN SATURATION: 98 % | RESPIRATION RATE: 16 BRPM | BODY MASS INDEX: 25.25 KG/M2 | HEART RATE: 65 BPM | WEIGHT: 137.2 LBS

## 2019-11-12 LAB
ANION GAP SERPL CALCULATED.3IONS-SCNC: 11 MMOL/L (ref 5–15)
BASOPHILS # BLD AUTO: 0.02 10*3/MM3 (ref 0–0.2)
BASOPHILS NFR BLD AUTO: 0.3 % (ref 0–1.5)
BUN BLD-MCNC: 9 MG/DL (ref 6–20)
BUN/CREAT SERPL: 12.7 (ref 7–25)
CALCIUM SPEC-SCNC: 9 MG/DL (ref 8.6–10.5)
CHLORIDE SERPL-SCNC: 103 MMOL/L (ref 98–107)
CO2 SERPL-SCNC: 25 MMOL/L (ref 22–29)
CREAT BLD-MCNC: 0.71 MG/DL (ref 0.57–1)
CYTO UR: NORMAL
DEPRECATED RDW RBC AUTO: 42.5 FL (ref 37–54)
EOSINOPHIL # BLD AUTO: 0.04 10*3/MM3 (ref 0–0.4)
EOSINOPHIL NFR BLD AUTO: 0.6 % (ref 0.3–6.2)
ERYTHROCYTE [DISTWIDTH] IN BLOOD BY AUTOMATED COUNT: 12.2 % (ref 12.3–15.4)
GFR SERPL CREATININE-BSD FRML MDRD: 87 ML/MIN/1.73
GLUCOSE BLD-MCNC: 87 MG/DL (ref 65–99)
HCT VFR BLD AUTO: 35.3 % (ref 34–46.6)
HGB BLD-MCNC: 12.2 G/DL (ref 12–15.9)
IMM GRANULOCYTES # BLD AUTO: 0.03 10*3/MM3 (ref 0–0.05)
IMM GRANULOCYTES NFR BLD AUTO: 0.4 % (ref 0–0.5)
LAB AP CASE REPORT: NORMAL
LYMPHOCYTES # BLD AUTO: 2.05 10*3/MM3 (ref 0.7–3.1)
LYMPHOCYTES NFR BLD AUTO: 29 % (ref 19.6–45.3)
MCH RBC QN AUTO: 33.1 PG (ref 26.6–33)
MCHC RBC AUTO-ENTMCNC: 34.6 G/DL (ref 31.5–35.7)
MCV RBC AUTO: 95.7 FL (ref 79–97)
MONOCYTES # BLD AUTO: 0.79 10*3/MM3 (ref 0.1–0.9)
MONOCYTES NFR BLD AUTO: 11.2 % (ref 5–12)
NEUTROPHILS # BLD AUTO: 4.14 10*3/MM3 (ref 1.7–7)
NEUTROPHILS NFR BLD AUTO: 58.5 % (ref 42.7–76)
NRBC BLD AUTO-RTO: 0 /100 WBC (ref 0–0.2)
PATH REPORT.FINAL DX SPEC: NORMAL
PATH REPORT.GROSS SPEC: NORMAL
PLATELET # BLD AUTO: 210 10*3/MM3 (ref 140–450)
PMV BLD AUTO: 10.5 FL (ref 6–12)
POTASSIUM BLD-SCNC: 3.9 MMOL/L (ref 3.5–5.2)
RBC # BLD AUTO: 3.69 10*6/MM3 (ref 3.77–5.28)
SODIUM BLD-SCNC: 139 MMOL/L (ref 136–145)
WBC NRBC COR # BLD: 7.07 10*3/MM3 (ref 3.4–10.8)

## 2019-11-12 PROCEDURE — 80048 BASIC METABOLIC PNL TOTAL CA: CPT | Performed by: OBSTETRICS & GYNECOLOGY

## 2019-11-12 PROCEDURE — 25010000002 KETOROLAC TROMETHAMINE PER 15 MG: Performed by: OBSTETRICS & GYNECOLOGY

## 2019-11-12 PROCEDURE — 85025 COMPLETE CBC W/AUTO DIFF WBC: CPT | Performed by: OBSTETRICS & GYNECOLOGY

## 2019-11-12 RX ORDER — OXYCODONE AND ACETAMINOPHEN 10; 325 MG/1; MG/1
1 TABLET ORAL EVERY 6 HOURS PRN
Status: DISCONTINUED | OUTPATIENT
Start: 2019-11-12 | End: 2019-11-12 | Stop reason: HOSPADM

## 2019-11-12 RX ORDER — DOCUSATE SODIUM 100 MG/1
100 CAPSULE, LIQUID FILLED ORAL 2 TIMES DAILY
Status: DISCONTINUED | OUTPATIENT
Start: 2019-11-12 | End: 2019-11-12 | Stop reason: HOSPADM

## 2019-11-12 RX ORDER — KETOROLAC TROMETHAMINE 30 MG/ML
30 INJECTION, SOLUTION INTRAMUSCULAR; INTRAVENOUS EVERY 6 HOURS PRN
Status: DISCONTINUED | OUTPATIENT
Start: 2019-11-12 | End: 2019-11-12 | Stop reason: HOSPADM

## 2019-11-12 RX ADMIN — GABAPENTIN 600 MG: 300 CAPSULE ORAL at 16:23

## 2019-11-12 RX ADMIN — DOCUSATE SODIUM 100 MG: 100 CAPSULE, LIQUID FILLED ORAL at 09:09

## 2019-11-12 RX ADMIN — KETOROLAC TROMETHAMINE 30 MG: 30 INJECTION, SOLUTION INTRAMUSCULAR at 09:09

## 2019-11-12 RX ADMIN — KETOROLAC TROMETHAMINE 30 MG: 30 INJECTION, SOLUTION INTRAMUSCULAR at 14:53

## 2019-11-12 RX ADMIN — GABAPENTIN 600 MG: 300 CAPSULE ORAL at 09:09

## 2019-11-12 RX ADMIN — OXYCODONE HYDROCHLORIDE 5 MG: 5 TABLET ORAL at 05:05

## 2019-11-12 NOTE — PLAN OF CARE
Problem: Patient Care Overview  Goal: Plan of Care Review  Outcome: Ongoing (interventions implemented as appropriate)   11/12/19 0456   Coping/Psychosocial   Plan of Care Reviewed With patient   Plan of Care Review   Progress improving   OTHER   Outcome Summary doing well, adequate pain control on oral pain meds, slept well, ambulated, good urine poutput will D/C [place] this am, scant vaginal discharge, lap sites CDI, abdomen soft     Goal: Individualization and Mutuality  Outcome: Ongoing (interventions implemented as appropriate)    Goal: Discharge Needs Assessment  Outcome: Ongoing (interventions implemented as appropriate)      Problem: Pain, Chronic (Adult)  Goal: Acceptable Pain/Comfort Level and Functional Ability  Outcome: Ongoing (interventions implemented as appropriate)      Problem: Hysterectomy (Adult)  Goal: Signs and Symptoms of Listed Potential Problems Will be Absent, Minimized or Managed (Hysterectomy)  Outcome: Ongoing (interventions implemented as appropriate)    Goal: Anesthesia/Sedation Recovery  Outcome: Outcome(s) achieved Date Met: 11/12/19

## 2019-11-12 NOTE — PROGRESS NOTES
Discharge Planning Assessment  [facility]     Patient Name: Macie Sarabia  MRN: [id number]  Today's Date: 11/12/2019    Admit Date: 11/11/2019      Discharge Plan     Row Name 11/12/19 1719       Plan    Plan Comments  Discharge order, no discharge needs identified.    Final Discharge Disposition Code  01 - home or self-care     Expected Discharge Date and Time     Expected Discharge Date Expected Discharge Time    Nov 12, 2019      Aniya Sullivan RN

## 2019-11-12 NOTE — PROGRESS NOTES
S:  Doing much better with Toradol.   Will discharge home.  Change to percocet 10 for her pain.     O:  VS stable    A: Improved     P: Discharge to rto in  Four weeks.

## 2019-11-12 NOTE — PROGRESS NOTES
S:  Doing well.  Having more pain this am.     O:  VS stable    Has not voided yet. \\Hb 12.2    Heent normal    Abdomen is soft and nontender.  Incisions intact    Legs Compression stockings in place    A:  Stable.  Cale work on pain control and add toradol    P:  Probably home later today.

## 2019-11-15 NOTE — DISCHARGE SUMMARY
Pt admitted 11/111 and underwent lavh with bilat salpingectomy.  She had poor pain control and was discharged home the  Eveningof 11/12 to rto in four weeks with me.   She had a hemoglobin of 12.2 following her surgery.   Discharged with percocet 325/10 mg.   To call for fever over 101, increasing pain or heavy vaginal bleeding.

## 2019-11-25 ENCOUNTER — OFFICE VISIT (OUTPATIENT)
Dept: FAMILY MEDICINE CLINIC | Facility: CLINIC | Age: 49
End: 2019-11-25

## 2019-11-25 VITALS
TEMPERATURE: 98.5 F | BODY MASS INDEX: 25.54 KG/M2 | SYSTOLIC BLOOD PRESSURE: 122 MMHG | WEIGHT: 138.8 LBS | HEIGHT: 62 IN | DIASTOLIC BLOOD PRESSURE: 88 MMHG | HEART RATE: 85 BPM | OXYGEN SATURATION: 99 %

## 2019-11-25 DIAGNOSIS — Z00.00 HEALTHCARE MAINTENANCE: ICD-10-CM

## 2019-11-25 DIAGNOSIS — R53.83 FATIGUE, UNSPECIFIED TYPE: ICD-10-CM

## 2019-11-25 DIAGNOSIS — Z90.710 S/P HYSTERECTOMY: ICD-10-CM

## 2019-11-25 DIAGNOSIS — R42 LIGHTHEADEDNESS: ICD-10-CM

## 2019-11-25 DIAGNOSIS — E78.5 DYSLIPIDEMIA: ICD-10-CM

## 2019-11-25 DIAGNOSIS — Z76.89 ENCOUNTER TO ESTABLISH CARE: Primary | ICD-10-CM

## 2019-11-25 DIAGNOSIS — K21.9 GASTROESOPHAGEAL REFLUX DISEASE, ESOPHAGITIS PRESENCE NOT SPECIFIED: ICD-10-CM

## 2019-11-25 LAB
25(OH)D3 SERPL-MCNC: 46 NG/ML (ref 30–100)
ALBUMIN SERPL-MCNC: 4.6 G/DL (ref 3.5–5.2)
ALBUMIN/GLOB SERPL: 1.7 G/DL
ALP SERPL-CCNC: 76 U/L (ref 39–117)
ALT SERPL W P-5'-P-CCNC: 11 U/L (ref 1–33)
ANION GAP SERPL CALCULATED.3IONS-SCNC: 15.9 MMOL/L (ref 5–15)
AST SERPL-CCNC: 8 U/L (ref 1–32)
BILIRUB SERPL-MCNC: 0.4 MG/DL (ref 0.2–1.2)
BUN BLD-MCNC: 9 MG/DL (ref 6–20)
BUN/CREAT SERPL: 12.9 (ref 7–25)
CALCIUM SPEC-SCNC: 9.6 MG/DL (ref 8.6–10.5)
CHLORIDE SERPL-SCNC: 99 MMOL/L (ref 98–107)
CHOLEST SERPL-MCNC: 252 MG/DL (ref 0–200)
CO2 SERPL-SCNC: 22.1 MMOL/L (ref 22–29)
CREAT BLD-MCNC: 0.7 MG/DL (ref 0.57–1)
ERYTHROCYTE [DISTWIDTH] IN BLOOD BY AUTOMATED COUNT: 13.2 % (ref 12.3–15.4)
GFR SERPL CREATININE-BSD FRML MDRD: 89 ML/MIN/1.73
GLOBULIN UR ELPH-MCNC: 2.7 GM/DL
GLUCOSE BLD-MCNC: 96 MG/DL (ref 65–99)
HCT VFR BLD AUTO: 41 % (ref 34–46.6)
HDLC SERPL-MCNC: 51 MG/DL (ref 40–60)
HGB BLD-MCNC: 13.4 G/DL (ref 12–15.9)
LDLC SERPL CALC-MCNC: 142 MG/DL (ref 0–100)
LDLC/HDLC SERPL: 2.78 {RATIO}
LYMPHOCYTES # BLD AUTO: 2 10*3/MM3 (ref 0.7–3.1)
LYMPHOCYTES NFR BLD AUTO: 24 % (ref 19.6–45.3)
MCH RBC QN AUTO: 31.2 PG (ref 26.6–33)
MCHC RBC AUTO-ENTMCNC: 32.8 G/DL (ref 31.5–35.7)
MCV RBC AUTO: 95.1 FL (ref 79–97)
MONOCYTES # BLD AUTO: 0.5 10*3/MM3 (ref 0.1–0.9)
MONOCYTES NFR BLD AUTO: 5.6 % (ref 5–12)
NEUTROPHILS # BLD AUTO: 5.8 10*3/MM3 (ref 1.7–7)
NEUTROPHILS NFR BLD AUTO: 70.4 % (ref 42.7–76)
PLATELET # BLD AUTO: 375 10*3/MM3 (ref 140–450)
PMV BLD AUTO: 8.2 FL (ref 6–12)
POTASSIUM BLD-SCNC: 4.1 MMOL/L (ref 3.5–5.2)
PROT SERPL-MCNC: 7.3 G/DL (ref 6–8.5)
RBC # BLD AUTO: 4.31 10*6/MM3 (ref 3.77–5.28)
SODIUM BLD-SCNC: 137 MMOL/L (ref 136–145)
T-UPTAKE NFR SERPL: 1.1 TBI (ref 0.8–1.3)
T4 SERPL-MCNC: 5.23 MCG/DL (ref 4.5–11.7)
TRIGL SERPL-MCNC: 295 MG/DL (ref 0–150)
TSH SERPL DL<=0.05 MIU/L-ACNC: 1.25 UIU/ML (ref 0.27–4.2)
VLDLC SERPL-MCNC: 59 MG/DL (ref 5–40)
WBC NRBC COR # BLD: 8.2 10*3/MM3 (ref 3.4–10.8)

## 2019-11-25 PROCEDURE — 85025 COMPLETE CBC W/AUTO DIFF WBC: CPT | Performed by: NURSE PRACTITIONER

## 2019-11-25 PROCEDURE — 36415 COLL VENOUS BLD VENIPUNCTURE: CPT | Performed by: NURSE PRACTITIONER

## 2019-11-25 PROCEDURE — 84479 ASSAY OF THYROID (T3 OR T4): CPT | Performed by: NURSE PRACTITIONER

## 2019-11-25 PROCEDURE — 80053 COMPREHEN METABOLIC PANEL: CPT | Performed by: NURSE PRACTITIONER

## 2019-11-25 PROCEDURE — 84443 ASSAY THYROID STIM HORMONE: CPT | Performed by: NURSE PRACTITIONER

## 2019-11-25 PROCEDURE — 82306 VITAMIN D 25 HYDROXY: CPT | Performed by: NURSE PRACTITIONER

## 2019-11-25 PROCEDURE — 84436 ASSAY OF TOTAL THYROXINE: CPT | Performed by: NURSE PRACTITIONER

## 2019-11-25 PROCEDURE — 80061 LIPID PANEL: CPT | Performed by: NURSE PRACTITIONER

## 2019-11-25 PROCEDURE — 99204 OFFICE O/P NEW MOD 45 MIN: CPT | Performed by: NURSE PRACTITIONER

## 2019-11-25 RX ORDER — LEVOTHYROXINE AND LIOTHYRONINE 38; 9 UG/1; UG/1
60 TABLET ORAL DAILY
COMMUNITY
End: 2020-02-07

## 2019-11-25 NOTE — PROGRESS NOTES
Subjective   Macie Sarabia is a 49 y.o. female.     History of Present Illness   Here today to establish care,  No recent pcp, she works in [email or web address], she is single, 1 child, she does not exercise, she denies smoking, she states diet is ok. She is fasting today.   Recently had hysterectomy 11/11/19 sees GYN Dr. Santiago, [facility], had labs prior to surgery, she had hx of fibroids, heavy menses.   With elevated BP prior to surgery 165/1041, 146/96, not taking BP meds. States she has been checking BP at home usually 140s-150s/80s, denies CP,SOA, HA, dizziness, does have some lightheadedness and nausea since surgery,she has not been taking pain medications.   does have reflux, burning at times. Did have normal ekg preop, prev tried OTC prilosec which helps, denies vomiting.   With hx of hypothyroid, was taking thyroid tablets, NP thyroid, saw 5 again.  does have fatigue, states thyroid meds did not help.  fatigued easily even before surgery,  Thinks she had colonoscopy 2015, had for abd pain, no polyps. Denies ulcer, given probiotic.   She states she has had chol checked at [facility] before and was told elevated, she was prev running but too fatigued to run.       The following portions of the patient's history were reviewed and updated as appropriate: allergies, current medications, past family history, past medical history, past social history, past surgical history and problem list.    Review of Systems   Constitutional: Positive for fatigue. Negative for chills, diaphoresis and fever.   HENT: Negative for congestion.    Respiratory: Negative for cough and shortness of breath.    Cardiovascular: Negative for chest pain, palpitations and leg swelling.   Gastrointestinal: Positive for nausea and GERD.   Musculoskeletal: Negative for arthralgias and myalgias.   Neurological: Positive for light-headedness. Negative for dizziness and headache.   All other systems reviewed and are negative.      Objective   Physical  Exam   Constitutional: She is oriented to person, place, and time. She appears well-developed and well-nourished.   HENT:   Head: Normocephalic.   Eyes: Pupils are equal, round, and reactive to light.   Neck: Normal range of motion.   Cardiovascular: Normal rate, regular rhythm, normal heart sounds and intact distal pulses.   Pulses:       Radial pulses are 2+ on the right side, and 2+ on the left side.        Dorsalis pedis pulses are 2+ on the right side, and 2+ on the left side.        Posterior tibial pulses are 2+ on the right side, and 2+ on the left side.   Pulmonary/Chest: Effort normal and breath sounds normal.   Abdominal: Soft. Normal appearance and bowel sounds are normal.   Musculoskeletal: Normal range of motion.   Lymphadenopathy:     She has no cervical adenopathy.   Neurological: She is alert and oriented to person, place, and time.   Skin: Skin is warm and dry.   Psychiatric: She has a normal mood and affect. Her behavior is normal.   Nursing note and vitals reviewed.        Assessment/Plan   Macie was seen today for establish care, labs only and hypertension.    Diagnoses and all orders for this visit:    Encounter to establish care  -     CBC & Differential  -     Comprehensive Metabolic Panel  -     Lipid Panel  -     Vitamin D 25 Hydroxy  -     Thyroid Panel With TSH  -     CBC Auto Differential    Fatigue, unspecified type  -     CBC & Differential  -     Comprehensive Metabolic Panel  -     Lipid Panel  -     Vitamin D 25 Hydroxy  -     Thyroid Panel With TSH  -     CBC Auto Differential    S/P hysterectomy  -     CBC & Differential  -     Comprehensive Metabolic Panel  -     Lipid Panel  -     Vitamin D 25 Hydroxy  -     Thyroid Panel With TSH  -     CBC Auto Differential    Lightheadedness  -     CBC & Differential  -     Comprehensive Metabolic Panel  -     Lipid Panel  -     Vitamin D 25 Hydroxy  -     Thyroid Panel With TSH  -     CBC Auto Differential    Healthcare maintenance  -      CBC & Differential  -     Comprehensive Metabolic Panel  -     Lipid Panel  -     Vitamin D 25 Hydroxy  -     Thyroid Panel With TSH  -     CBC Auto Differential    Dyslipidemia  -     CBC & Differential  -     Comprehensive Metabolic Panel  -     Lipid Panel  -     Vitamin D 25 Hydroxy  -     Thyroid Panel With TSH  -     CBC Auto Differential    Gastroesophageal reflux disease, esophagitis presence not specified      Labs today will call with results. CBC WNL today,   Deferred gerd meds, she will check with her GYN about nausea post op.   Reviewed preop labs and ekg,   If any worsening symptoms, nausea, dizziness advised ER.   Increase fluid intake, get plenty of rest.   BP WNL today she will monitor at home call with elevated readings >140/90  She will have mammo and pap records faxed for review.   Patient agrees with plan of care and understands instructions. Call if worsening symptoms or any problems or concerns.     Current outpatient and discharge medications have been reconciled for the patient.  Reviewed by: ROBERT Salmeron

## 2019-11-25 NOTE — PATIENT INSTRUCTIONS
Labs today will call with results. CBC WNL today,   Deferred gerd meds, she will check with her GYN about nausea post op.   Reviewed preop labs and ekg,   If any worsening symptoms, nausea, dizziness advised ER.   Increase fluid intake, get plenty of rest.   She will have mammo and pap records faxed for review.   Patient agrees with plan of care and understands instructions. Call if worsening symptoms or any problems or concerns.

## 2019-12-03 ENCOUNTER — TELEPHONE (OUTPATIENT)
Dept: FAMILY MEDICINE CLINIC | Facility: CLINIC | Age: 49
End: 2019-12-03

## 2019-12-03 NOTE — TELEPHONE ENCOUNTER
PATIENT IS ON NP THYROID 60 MG.  PATIENTS FEELS NAUSEA EVERY MORNING WHEN SHE TAKES THIS MEDICATION. PATIENT DOESN'T WANT TO TAKE THE NP THYROID ANYMORE AND WANTS TO SEE ABOUT SWITCHING TO A DIFFERENT THYROID MEDICATION

## 2019-12-04 NOTE — TELEPHONE ENCOUNTER
That thyroid medication is usually a supplement, she may not need thyroid medication at all. I would recommend to stop and can recheck thyroid labs to see if truly thyroid problem.

## 2019-12-04 NOTE — TELEPHONE ENCOUNTER
Patient informed and was getting medication from [facility] again.  She will have them fax all results to you.  Does not want to stop medication with no other med to fall back on.ender

## 2019-12-04 NOTE — TELEPHONE ENCOUNTER
I do not prescribe NP thyroid, who gave her this in the past? Her thyroid labs are normal, may need to stop medication and recheck labs in about 4 weeks.

## 2019-12-18 ENCOUNTER — TELEPHONE (OUTPATIENT)
Dept: FAMILY MEDICINE CLINIC | Facility: CLINIC | Age: 49
End: 2019-12-18

## 2019-12-18 NOTE — TELEPHONE ENCOUNTER
Is it nausea from surgery? If so needs to check with her surgeon. If before surgery need more information, may need GI consult.

## 2019-12-19 ENCOUNTER — TELEPHONE (OUTPATIENT)
Dept: FAMILY MEDICINE CLINIC | Facility: CLINIC | Age: 49
End: 2019-12-19

## 2019-12-19 RX ORDER — ONDANSETRON 4 MG/1
4 TABLET, FILM COATED ORAL EVERY 8 HOURS PRN
Qty: 12 TABLET | Refills: 0 | Status: SHIPPED | OUTPATIENT
Start: 2019-12-19 | End: 2021-01-05

## 2019-12-19 NOTE — TELEPHONE ENCOUNTER
Pt states she has followed up with surgeon multiple times it is not surgery related, She states she has a GI consult scheduled for Thursday the day after albino, she is wanting to know if she can get something other then phenergan till she sees the GI so she can at least beable to work

## 2019-12-26 ENCOUNTER — OFFICE (OUTPATIENT)
Dept: URBAN - METROPOLITAN AREA CLINIC 66 | Facility: CLINIC | Age: 49
End: 2019-12-26

## 2019-12-26 ENCOUNTER — TRANSCRIBE ORDERS (OUTPATIENT)
Dept: ADMINISTRATIVE | Facility: HOSPITAL | Age: 49
End: 2019-12-26

## 2019-12-26 VITALS
SYSTOLIC BLOOD PRESSURE: 170 MMHG | WEIGHT: 141 LBS | DIASTOLIC BLOOD PRESSURE: 100 MMHG | HEART RATE: 93 BPM | HEIGHT: 62 IN

## 2019-12-26 DIAGNOSIS — R10.13 EPIGASTRIC PAIN: ICD-10-CM

## 2019-12-26 DIAGNOSIS — R11.0 NAUSEA: Primary | ICD-10-CM

## 2019-12-26 DIAGNOSIS — K92.1 MELENA: ICD-10-CM

## 2019-12-26 DIAGNOSIS — R11.0 NAUSEA: ICD-10-CM

## 2019-12-26 DIAGNOSIS — R19.4 CHANGE IN BOWEL HABIT: ICD-10-CM

## 2019-12-26 DIAGNOSIS — Z78.9 DISCOMFORT: ICD-10-CM

## 2019-12-26 DIAGNOSIS — K21.9 GASTRO-ESOPHAGEAL REFLUX DISEASE WITHOUT ESOPHAGITIS: ICD-10-CM

## 2019-12-26 PROCEDURE — 99243 OFF/OP CNSLTJ NEW/EST LOW 30: CPT | Performed by: NURSE PRACTITIONER

## 2019-12-26 RX ORDER — OMEPRAZOLE 40 MG/1
40 CAPSULE, DELAYED RELEASE ORAL
Qty: 30 | Refills: 3 | Status: COMPLETED
Start: 2019-12-26 | End: 2024-05-06

## 2019-12-26 RX ORDER — PROMETHAZINE HYDROCHLORIDE 12.5 MG/1
50 TABLET ORAL
Qty: 60 | Refills: 1 | Status: COMPLETED
Start: 2019-12-26 | End: 2024-05-06

## 2019-12-31 ENCOUNTER — HOSPITAL ENCOUNTER (OUTPATIENT)
Dept: ULTRASOUND IMAGING | Facility: HOSPITAL | Age: 49
Discharge: HOME | End: 2019-12-31
Admitting: NURSE PRACTITIONER

## 2019-12-31 DIAGNOSIS — Z78.9 DISCOMFORT: ICD-10-CM

## 2019-12-31 DIAGNOSIS — R11.0 NAUSEA: ICD-10-CM

## 2019-12-31 PROCEDURE — 76700 US EXAM ABDOM COMPLETE: CPT

## 2020-01-31 ENCOUNTER — ANESTHESIA EVENT (OUTPATIENT)
Dept: GASTROENTEROLOGY | Facility: HOSPITAL | Age: 50
End: 2020-01-31

## 2020-01-31 ENCOUNTER — HOSPITAL ENCOUNTER (OUTPATIENT)
Facility: HOSPITAL | Age: 50
Setting detail: HOSPITAL OUTPATIENT SURGERY
Discharge: HOME | End: 2020-01-31
Attending: INTERNAL MEDICINE | Admitting: INTERNAL MEDICINE

## 2020-01-31 ENCOUNTER — ANESTHESIA (OUTPATIENT)
Dept: GASTROENTEROLOGY | Facility: HOSPITAL | Age: 50
End: 2020-01-31

## 2020-01-31 ENCOUNTER — ON CAMPUS - OUTPATIENT (OUTPATIENT)
Dept: URBAN - METROPOLITAN AREA HOSPITAL 114 | Facility: HOSPITAL | Age: 50
End: 2020-01-31

## 2020-01-31 VITALS
HEIGHT: 62 IN | RESPIRATION RATE: 18 BRPM | WEIGHT: 136 LBS | DIASTOLIC BLOOD PRESSURE: 103 MMHG | BODY MASS INDEX: 25.03 KG/M2 | OXYGEN SATURATION: 98 % | SYSTOLIC BLOOD PRESSURE: 168 MMHG | HEART RATE: 78 BPM

## 2020-01-31 DIAGNOSIS — R10.13 EPIGASTRIC PAIN: ICD-10-CM

## 2020-01-31 DIAGNOSIS — K20.0 EOSINOPHILIC ESOPHAGITIS: ICD-10-CM

## 2020-01-31 DIAGNOSIS — R68.81 EARLY SATIETY: ICD-10-CM

## 2020-01-31 DIAGNOSIS — R11.0 NAUSEA: ICD-10-CM

## 2020-01-31 DIAGNOSIS — K29.00 ACUTE GASTRITIS WITHOUT BLEEDING: ICD-10-CM

## 2020-01-31 DIAGNOSIS — R19.4 CHANGE IN BOWEL HABIT: ICD-10-CM

## 2020-01-31 DIAGNOSIS — K44.9 DIAPHRAGMATIC HERNIA WITHOUT OBSTRUCTION OR GANGRENE: ICD-10-CM

## 2020-01-31 PROCEDURE — 25010000002 PROPOFOL 10 MG/ML EMULSION: Performed by: ANESTHESIOLOGY

## 2020-01-31 PROCEDURE — 88342 IMHCHEM/IMCYTCHM 1ST ANTB: CPT | Performed by: INTERNAL MEDICINE

## 2020-01-31 PROCEDURE — 43239 EGD BIOPSY SINGLE/MULTIPLE: CPT | Performed by: INTERNAL MEDICINE

## 2020-01-31 PROCEDURE — 88305 TISSUE EXAM BY PATHOLOGIST: CPT | Performed by: INTERNAL MEDICINE

## 2020-01-31 PROCEDURE — 25010000002 ONDANSETRON PER 1 MG: Performed by: INTERNAL MEDICINE

## 2020-01-31 PROCEDURE — 45378 DIAGNOSTIC COLONOSCOPY: CPT | Performed by: INTERNAL MEDICINE

## 2020-01-31 RX ORDER — ONDANSETRON 2 MG/ML
4 INJECTION INTRAMUSCULAR; INTRAVENOUS ONCE
Status: COMPLETED | OUTPATIENT
Start: 2020-01-31 | End: 2020-01-31

## 2020-01-31 RX ORDER — LIDOCAINE HYDROCHLORIDE 20 MG/ML
INJECTION, SOLUTION INFILTRATION; PERINEURAL AS NEEDED
Status: DISCONTINUED | OUTPATIENT
Start: 2020-01-31 | End: 2020-01-31 | Stop reason: SURG

## 2020-01-31 RX ORDER — PROPOFOL 10 MG/ML
VIAL (ML) INTRAVENOUS CONTINUOUS PRN
Status: DISCONTINUED | OUTPATIENT
Start: 2020-01-31 | End: 2020-01-31 | Stop reason: SURG

## 2020-01-31 RX ORDER — SODIUM CHLORIDE, SODIUM LACTATE, POTASSIUM CHLORIDE, CALCIUM CHLORIDE 600; 310; 30; 20 MG/100ML; MG/100ML; MG/100ML; MG/100ML
30 INJECTION, SOLUTION INTRAVENOUS CONTINUOUS PRN
Status: DISCONTINUED | OUTPATIENT
Start: 2020-01-31 | End: 2020-01-31 | Stop reason: HOSPADM

## 2020-01-31 RX ORDER — PROPOFOL 10 MG/ML
VIAL (ML) INTRAVENOUS AS NEEDED
Status: DISCONTINUED | OUTPATIENT
Start: 2020-01-31 | End: 2020-01-31 | Stop reason: SURG

## 2020-01-31 RX ADMIN — PROPOFOL 140 MCG/KG/MIN: 10 INJECTION, EMULSION INTRAVENOUS at 10:29

## 2020-01-31 RX ADMIN — SODIUM CHLORIDE, POTASSIUM CHLORIDE, SODIUM LACTATE AND CALCIUM CHLORIDE 30 ML/HR: 600; 310; 30; 20 INJECTION, SOLUTION INTRAVENOUS at 09:28

## 2020-01-31 RX ADMIN — ONDANSETRON 4 MG: 2 INJECTION INTRAMUSCULAR; INTRAVENOUS at 11:30

## 2020-01-31 RX ADMIN — PROPOFOL 125 MG: 10 INJECTION, EMULSION INTRAVENOUS at 10:29

## 2020-01-31 RX ADMIN — LIDOCAINE HYDROCHLORIDE 50 MG: 20 INJECTION, SOLUTION INFILTRATION; PERINEURAL at 10:29

## 2020-01-31 NOTE — ANESTHESIA POSTPROCEDURE EVALUATION
Patient: Macie Sarabia    Procedure Summary     Date:  01/31/20 Room / Location:   [facility]    Anesthesia Start:  1023 Anesthesia Stop:  1059    Procedures:       ESOPHAGOGASTRODUODENOSCOPY with biopsy (N/A Esophagus)      COLONOSCOPY into cecum/terminal ileum (N/A ) Diagnosis:      Surgeon:  Scooter Gross MD Provider:  Jorge Henry MD    Anesthesia Type:  MAC ASA Status:  1          Anesthesia Type: MAC    Vitals  Vitals Value Taken Time   /98 1/31/2020 11:02 AM   Temp     Pulse 74 1/31/2020 11:15 AM   Resp 18 1/31/2020 11:15 AM   SpO2 93 % 1/31/2020 11:15 AM           Post Anesthesia Care and Evaluation    Patient location during evaluation: bedside  Patient participation: complete - patient participated  Level of consciousness: awake and alert  Pain score: 0  Pain management: adequate  Airway patency: patent  Anesthetic complications: No anesthetic complications  PONV Status: none  Cardiovascular status: acceptable  Respiratory status: acceptable  Hydration status: acceptable  Post Neuraxial Block status: Motor and sensory function returned to baseline

## 2020-01-31 NOTE — ANESTHESIA PREPROCEDURE EVALUATION
Anesthesia Evaluation     Patient summary reviewed   NPO Solid Status: > 8 hours  NPO Liquid Status: > 8 hours           Airway   Mallampati: III  TM distance: <3 FB  Neck ROM: limited  Possible difficult intubation  Dental - normal exam     Pulmonary     breath sounds clear to auscultation  Cardiovascular   Exercise tolerance: good (4-7 METS)    Rhythm: regular  Rate: normal        Neuro/Psych  GI/Hepatic/Renal/Endo      Musculoskeletal     Abdominal    Substance History      OB/GYN          Other                        Anesthesia Plan    ASA 1     MAC     intravenous induction     Anesthetic plan, all risks, benefits, and alternatives have been provided, discussed and informed consent has been obtained with: patient.

## 2020-02-04 LAB
CYTO UR: NORMAL
LAB AP CASE REPORT: NORMAL
LAB AP DIAGNOSIS COMMENT: NORMAL
PATH REPORT.ADDENDUM SPEC: NORMAL
PATH REPORT.FINAL DX SPEC: NORMAL
PATH REPORT.GROSS SPEC: NORMAL

## 2020-02-07 ENCOUNTER — OFFICE VISIT (OUTPATIENT)
Dept: FAMILY MEDICINE CLINIC | Facility: CLINIC | Age: 50
End: 2020-02-07

## 2020-02-07 VITALS
BODY MASS INDEX: 26.13 KG/M2 | WEIGHT: 142 LBS | HEART RATE: 97 BPM | TEMPERATURE: 98.3 F | SYSTOLIC BLOOD PRESSURE: 162 MMHG | DIASTOLIC BLOOD PRESSURE: 94 MMHG | HEIGHT: 62 IN | OXYGEN SATURATION: 100 %

## 2020-02-07 DIAGNOSIS — K21.9 GASTROESOPHAGEAL REFLUX DISEASE, ESOPHAGITIS PRESENCE NOT SPECIFIED: ICD-10-CM

## 2020-02-07 DIAGNOSIS — Z00.8 ENCOUNTER FOR BIOMETRIC SCREENING: Primary | ICD-10-CM

## 2020-02-07 DIAGNOSIS — R06.83 SNORING: ICD-10-CM

## 2020-02-07 DIAGNOSIS — R79.89 ELEVATED TSH: Primary | ICD-10-CM

## 2020-02-07 DIAGNOSIS — R53.83 FATIGUE, UNSPECIFIED TYPE: Primary | ICD-10-CM

## 2020-02-07 DIAGNOSIS — R79.89 ABNORMAL TSH: ICD-10-CM

## 2020-02-07 DIAGNOSIS — E78.5 DYSLIPIDEMIA: ICD-10-CM

## 2020-02-07 DIAGNOSIS — R03.0 ELEVATED BP WITHOUT DIAGNOSIS OF HYPERTENSION: ICD-10-CM

## 2020-02-07 DIAGNOSIS — R11.0 NAUSEA: ICD-10-CM

## 2020-02-07 DIAGNOSIS — R53.83 FATIGUE, UNSPECIFIED TYPE: ICD-10-CM

## 2020-02-07 LAB
25(OH)D3 SERPL-MCNC: 42.8 NG/ML (ref 30–100)
ALBUMIN SERPL-MCNC: 4.4 G/DL (ref 3.5–5.2)
ALBUMIN/GLOB SERPL: 2 G/DL
ALP SERPL-CCNC: 72 U/L (ref 39–117)
ALT SERPL W P-5'-P-CCNC: 16 U/L (ref 1–33)
ANION GAP SERPL CALCULATED.3IONS-SCNC: 13.8 MMOL/L (ref 5–15)
AST SERPL-CCNC: 11 U/L (ref 1–32)
BILIRUB SERPL-MCNC: 0.3 MG/DL (ref 0.2–1.2)
BUN BLD-MCNC: 16 MG/DL (ref 6–20)
BUN/CREAT SERPL: 23.2 (ref 7–25)
CALCIUM SPEC-SCNC: 9.2 MG/DL (ref 8.6–10.5)
CHLORIDE SERPL-SCNC: 99 MMOL/L (ref 98–107)
CHOLEST SERPL-MCNC: 252 MG/DL (ref 0–200)
CO2 SERPL-SCNC: 24.2 MMOL/L (ref 22–29)
CREAT BLD-MCNC: 0.69 MG/DL (ref 0.57–1)
ERYTHROCYTE [DISTWIDTH] IN BLOOD BY AUTOMATED COUNT: 13.4 % (ref 12.3–15.4)
GFR SERPL CREATININE-BSD FRML MDRD: 90 ML/MIN/1.73
GLOBULIN UR ELPH-MCNC: 2.2 GM/DL
GLUCOSE BLD-MCNC: 106 MG/DL (ref 65–99)
HCT VFR BLD AUTO: 40.1 % (ref 34–46.6)
HDLC SERPL-MCNC: 70 MG/DL (ref 40–60)
HGB BLD-MCNC: 13.6 G/DL (ref 12–15.9)
LDLC SERPL CALC-MCNC: 146 MG/DL (ref 0–100)
LDLC/HDLC SERPL: 2.09 {RATIO}
LYMPHOCYTES # BLD AUTO: 2.1 10*3/MM3 (ref 0.7–3.1)
LYMPHOCYTES NFR BLD AUTO: 35.9 % (ref 19.6–45.3)
MCH RBC QN AUTO: 32.2 PG (ref 26.6–33)
MCHC RBC AUTO-ENTMCNC: 33.9 G/DL (ref 31.5–35.7)
MCV RBC AUTO: 94.9 FL (ref 79–97)
MONOCYTES # BLD AUTO: 0.4 10*3/MM3 (ref 0.1–0.9)
MONOCYTES NFR BLD AUTO: 7.3 % (ref 5–12)
NEUTROPHILS # BLD AUTO: 3.3 10*3/MM3 (ref 1.7–7)
NEUTROPHILS NFR BLD AUTO: 56.8 % (ref 42.7–76)
PLATELET # BLD AUTO: 254 10*3/MM3 (ref 140–450)
PMV BLD AUTO: 7.8 FL (ref 6–12)
POTASSIUM BLD-SCNC: 4.3 MMOL/L (ref 3.5–5.2)
PROT SERPL-MCNC: 6.6 G/DL (ref 6–8.5)
RBC # BLD AUTO: 4.23 10*6/MM3 (ref 3.77–5.28)
SODIUM BLD-SCNC: 137 MMOL/L (ref 136–145)
TRIGL SERPL-MCNC: 178 MG/DL (ref 0–150)
TSH SERPL DL<=0.05 MIU/L-ACNC: 4.44 UIU/ML (ref 0.27–4.2)
VIT B12 BLD-MCNC: 661 PG/ML (ref 211–946)
VLDLC SERPL-MCNC: 35.6 MG/DL (ref 5–40)
WBC NRBC COR # BLD: 5.8 10*3/MM3 (ref 3.4–10.8)

## 2020-02-07 PROCEDURE — 36415 COLL VENOUS BLD VENIPUNCTURE: CPT | Performed by: NURSE PRACTITIONER

## 2020-02-07 PROCEDURE — 80061 LIPID PANEL: CPT | Performed by: NURSE PRACTITIONER

## 2020-02-07 PROCEDURE — 80053 COMPREHEN METABOLIC PANEL: CPT | Performed by: NURSE PRACTITIONER

## 2020-02-07 PROCEDURE — 99396 PREV VISIT EST AGE 40-64: CPT | Performed by: NURSE PRACTITIONER

## 2020-02-07 PROCEDURE — 82306 VITAMIN D 25 HYDROXY: CPT | Performed by: NURSE PRACTITIONER

## 2020-02-07 PROCEDURE — 82607 VITAMIN B-12: CPT | Performed by: NURSE PRACTITIONER

## 2020-02-07 PROCEDURE — 84443 ASSAY THYROID STIM HORMONE: CPT | Performed by: NURSE PRACTITIONER

## 2020-02-07 PROCEDURE — 85025 COMPLETE CBC W/AUTO DIFF WBC: CPT | Performed by: NURSE PRACTITIONER

## 2020-02-07 RX ORDER — LISINOPRIL 10 MG/1
10 TABLET ORAL DAILY
Qty: 30 TABLET | Refills: 2 | Status: SHIPPED | OUTPATIENT
Start: 2020-02-07 | End: 2020-05-04

## 2020-02-07 RX ORDER — ATORVASTATIN CALCIUM 10 MG/1
10 TABLET, FILM COATED ORAL DAILY
Qty: 30 TABLET | Refills: 3 | Status: SHIPPED | OUTPATIENT
Start: 2020-02-07 | End: 2020-05-04

## 2020-02-07 RX ORDER — PANTOPRAZOLE SODIUM 40 MG/1
40 TABLET, DELAYED RELEASE ORAL DAILY
Qty: 30 TABLET | Refills: 3 | Status: SHIPPED | OUTPATIENT
Start: 2020-02-07 | End: 2023-01-10

## 2020-02-07 NOTE — PATIENT INSTRUCTIONS
Iron Deficiency Anemia, Adult  Iron-deficiency anemia is when you have a low amount of red blood cells or hemoglobin. This happens because you have too little iron in your body. Hemoglobin carries oxygen to parts of the body. Anemia can cause your body to not get enough oxygen. It may or may not cause symptoms.  Follow these instructions at home:  Medicines  · Take over-the-counter and prescription medicines only as told by your doctor. This includes iron pills (supplements) and vitamins.  · If you cannot handle taking iron pills by mouth, ask your doctor about getting iron through:  ? A vein (intravenously).  ? A shot (injection) into a muscle.  · Take iron pills when your stomach is empty. If you cannot handle this, take them with food.  · Do not drink milk or take antacids at the same time as your iron pills.  · To prevent trouble pooping (constipation), eat fiber or take medicine (stool softener) as told by your doctor.  Eating and drinking    · Talk with your doctor before changing the foods you eat. He or she may tell you to eat foods that have a lot of iron, such as:  ? Liver.  ? Lowfat (lean) beef.  ? Breads and cereals that have iron added to them (fortified breads and cereals).  ? Eggs.  ? Dried fruit.  ? Dark green, leafy vegetables.  · Drink enough fluid to keep your pee (urine) clear or pale yellow.  · Eat fresh fruits and vegetables that are high in vitamin C. They help your body to use iron. Foods with a lot of vitamin C include:  ? Oranges.  ? Peppers.  ? Tomatoes.  ? Mangoes.  General instructions  · Return to your normal activities as told by your doctor. Ask your doctor what activities are safe for you.  · Keep yourself clean, and keep things clean around you (your surroundings). Anemia can make you get sick more easily.  · Keep all follow-up visits as told by your doctor. This is important.  Contact a doctor if:  · You feel sick to your stomach (nauseous).  · You throw up (vomit).  · You feel  weak.  · You are sweating for no clear reason.  · You have trouble pooping, such as:  ? Pooping (having a bowel movement) less than 3 times a week.  ? Straining to poop.  ? Having poop that is hard, dry, or larger than normal.  ? Feeling full or bloated.  ? Pain in the lower belly.  ? Not feeling better after pooping.  Get help right away if:  · You pass out (faint). If this happens, do not drive yourself to the hospital. Call your local emergency services (911 in the [place]  · You have chest pain.  · You have shortness of breath that:  ? Is very bad.  ? Gets worse with physical activity.  · You have a fast heartbeat.  · You get light-headed when getting up from sitting or lying down.  This information is not intended to replace advice given to you by your health care provider. Make sure you discuss any questions you have with your health care provider.  Document Released: 01/20/2012 Document Revised: 09/06/2017 Document Reviewed: 09/06/2017  [employer] Patient Education © 2019 [employer]      Labs today will call with results.   Cont f/u with GI as scheduled.   Begin lisinopril 10mg daily, monitor BP at home call with elevated readings >140/90  Refer to sleep med will call with appt.   Encouraged regular exercise such as walking,   Begin protonix 40mg daily in am.   Increase fluid intake, get plenty of rest.   Patient agrees with plan of care and understands instructions. Call if worsening symptoms or any problems or concerns.

## 2020-02-07 NOTE — PROGRESS NOTES
Subjective   Macie Sarabia is a 49 y.o. female.     History of Present Illness   Here today for f/u, biometric exam. She does not exercise, working on diet.  she denies smoking, she is over due eye exam and dentist. She is fasting today.    with elevated BP, not currently taking medications, states BP elevated when had procedure this week 168/103. She does not check BP at home, denies CP, SOA, HA, LE edema.. Dizziness. She denies family hx of heart disease, She is not exercising d/t fatigue. Normal ekg 11/19 prior to hysterectomy. States she has hx of HTN but changed diet.   C/o nausea, sees GI Dr. Gross, she had egd and colonoscopy 1/31/2020 with GI. Normal  US abd 12/31/19. Taking zofran as needed. cscope normal, repeat 10 years, egd showed chronic gastritis and possible eosinophilic esophagitis. Path still pending. She has not yet started ppi.   Also c/o fatigue, prev taking NP thyroid via 25 again weight loss, she has been off this for about 2 months she states sleep is not good. She tried melatonin, also tried valerian root, she states she does snore. She states she is tired after work. She wakes up in the middle of the night, she has trouble falling asleep at times. She does eat mostly plant based diet, limited meat.           The following portions of the patient's history were reviewed and updated as appropriate: allergies, current medications, past family history, past medical history, past social history, past surgical history and problem list.    Review of Systems   Constitutional: Positive for fatigue. Negative for chills, diaphoresis and fever.   Respiratory: Negative for cough and shortness of breath.    Cardiovascular: Negative for chest pain.   Gastrointestinal: Positive for nausea.   Musculoskeletal: Negative for arthralgias and myalgias.   Neurological: Negative for dizziness, light-headedness and headache.   All other systems reviewed and are negative.      Objective   Physical Exam    Constitutional: She is oriented to person, place, and time. She appears well-developed and well-nourished.   HENT:   Head: Normocephalic.   Eyes: Pupils are equal, round, and reactive to light.   Neck: Normal range of motion.   Cardiovascular: Normal rate, regular rhythm, normal heart sounds and intact distal pulses.   Pulmonary/Chest: Effort normal and breath sounds normal.   Musculoskeletal: Normal range of motion.   Neurological: She is alert and oriented to person, place, and time.   Skin: Skin is warm and dry.   Psychiatric: She has a normal mood and affect. Her behavior is normal.   Nursing note and vitals reviewed.        Assessment/Plan   Macie was seen today for follow-up, labs only, hypertension, fatigue and nausea.    Diagnoses and all orders for this visit:    Encounter for biometric screening  -     CBC & Differential  -     Comprehensive Metabolic Panel  -     TSH  -     Vitamin B12  -     Vitamin D 25 Hydroxy  -     Lipid panel  -     CBC Auto Differential    Nausea  -     CBC & Differential  -     Comprehensive Metabolic Panel  -     TSH  -     Vitamin B12  -     Vitamin D 25 Hydroxy  -     Lipid panel  -     CBC Auto Differential    Fatigue, unspecified type  -     CBC & Differential  -     Comprehensive Metabolic Panel  -     TSH  -     Vitamin B12  -     Vitamin D 25 Hydroxy  -     Lipid panel  -     CBC Auto Differential  -     Ambulatory Referral to Sleep Medicine    Elevated BP without diagnosis of hypertension  -     CBC & Differential  -     Comprehensive Metabolic Panel  -     TSH  -     Vitamin B12  -     Vitamin D 25 Hydroxy  -     Lipid panel  -     CBC Auto Differential    Gastroesophageal reflux disease, esophagitis presence not specified  -     CBC & Differential  -     Comprehensive Metabolic Panel  -     TSH  -     Vitamin B12  -     Vitamin D 25 Hydroxy  -     Lipid panel  -     CBC Auto Differential    Snoring  -     Ambulatory Referral to Sleep Medicine    Other orders  -      pantoprazole (PROTONIX) 40 MG EC tablet; Take 1 tablet by mouth Daily.  -     lisinopril (PRINIVIL,ZESTRIL) 10 MG tablet; Take 1 tablet by mouth Daily.      Labs today will call with results.   Cont f/u with GI as scheduled.   Begin lisinopril 10mg daily, monitor BP at home call with elevated readings >140/90  Refer to sleep med will call with appt.   Encouraged regular exercise such as walking,   Begin protonix 40mg daily in am.   Increase fluid intake, get plenty of rest.   Patient agrees with plan of care and understands instructions. Call if worsening symptoms or any problems or concerns.

## 2020-02-15 ENCOUNTER — HOSPITAL ENCOUNTER (OUTPATIENT)
Dept: ULTRASOUND IMAGING | Facility: HOSPITAL | Age: 50
Discharge: HOME | End: 2020-02-15
Admitting: NURSE PRACTITIONER

## 2020-02-15 DIAGNOSIS — R53.83 FATIGUE, UNSPECIFIED TYPE: ICD-10-CM

## 2020-02-15 DIAGNOSIS — R79.89 ABNORMAL TSH: ICD-10-CM

## 2020-02-15 PROCEDURE — 76536 US EXAM OF HEAD AND NECK: CPT

## 2020-02-20 DIAGNOSIS — E06.9 THYROIDITIS: ICD-10-CM

## 2020-02-20 DIAGNOSIS — R79.89 ELEVATED TSH: Primary | ICD-10-CM

## 2020-02-20 RX ORDER — LEVOTHYROXINE SODIUM 0.03 MG/1
25 TABLET ORAL DAILY
Qty: 30 TABLET | Refills: 2 | Status: SHIPPED | OUTPATIENT
Start: 2020-02-20 | End: 2020-04-24 | Stop reason: ALTCHOICE

## 2020-04-03 ENCOUNTER — TELEPHONE (OUTPATIENT)
Dept: SLEEP MEDICINE | Facility: HOSPITAL | Age: 50
End: 2020-04-03

## 2020-04-09 ENCOUNTER — APPOINTMENT (OUTPATIENT)
Dept: SLEEP MEDICINE | Facility: HOSPITAL | Age: 50
End: 2020-04-09

## 2020-04-14 ENCOUNTER — TELEPHONE (OUTPATIENT)
Dept: FAMILY MEDICINE CLINIC | Facility: CLINIC | Age: 50
End: 2020-04-14

## 2020-04-14 DIAGNOSIS — E03.9 HYPOTHYROIDISM, UNSPECIFIED TYPE: Primary | ICD-10-CM

## 2020-04-24 ENCOUNTER — TELEMEDICINE (OUTPATIENT)
Dept: ENDOCRINOLOGY | Age: 50
End: 2020-04-24

## 2020-04-24 DIAGNOSIS — E78.2 MIXED DYSLIPIDEMIA: ICD-10-CM

## 2020-04-24 DIAGNOSIS — E06.3 HYPOTHYROIDISM DUE TO HASHIMOTO'S THYROIDITIS: Primary | ICD-10-CM

## 2020-04-24 DIAGNOSIS — E03.8 HYPOTHYROIDISM DUE TO HASHIMOTO'S THYROIDITIS: Primary | ICD-10-CM

## 2020-04-24 DIAGNOSIS — I10 ESSENTIAL HYPERTENSION: ICD-10-CM

## 2020-04-24 DIAGNOSIS — N95.1 SYMPTOMATIC MENOPAUSAL OR FEMALE CLIMACTERIC STATES: ICD-10-CM

## 2020-04-24 DIAGNOSIS — E55.9 VITAMIN D DEFICIENCY: ICD-10-CM

## 2020-04-24 PROCEDURE — 99204 OFFICE O/P NEW MOD 45 MIN: CPT | Performed by: INTERNAL MEDICINE

## 2020-04-24 RX ORDER — SENNOSIDES 8.6 MG
CAPSULE ORAL
COMMUNITY
Start: 2017-12-01 | End: 2023-01-24 | Stop reason: SDUPTHER

## 2020-04-24 RX ORDER — LEVOTHYROXINE SODIUM 0.05 MG/1
TABLET ORAL DAILY
COMMUNITY
Start: 2020-04-08 | End: 2020-05-06

## 2020-04-24 RX ORDER — OMEPRAZOLE 40 MG/1
CAPSULE, DELAYED RELEASE ORAL DAILY PRN
COMMUNITY
Start: 2020-01-29 | End: 2020-05-07 | Stop reason: HOSPADM

## 2020-04-24 NOTE — PROGRESS NOTES
Subjective   Macie Sarabia is a 49 y.o. female {Specialty - why patient is here?    History of Present Illness this is a 49-year-old female who has been referred for further evaluation and treatment of hypothyroidism which she says she has been dealing with for a long time.  Her most recent dose of thyroid hormone replacement is 50 mcg daily since 2/14/2020.  She said she is normally a very sharp and active warm man and used to run in marathons.  She also says she has to be able to be very focused and sharp on her job as a .  She also has hypertension and dyslipidemia.  She is not  at this time and has a 34-year-old son who also is suffering from hypothyroidism.  She is adopted and therefore there is no information about her family history.  She said she used to take medication or supplements to help her sleep however now she is having a hard time keeping herself awake.    The following portions of the patient's history were reviewed and updated as appropriate: allergies, current medications, past family history, past medical history, past social history, past surgical history and problem list.    Review of Systems  The above review of system was reviewed, corroborated and accepted.  Objective   Physical Exam  During this video encounter she seems to be alert and oriented and in no acute distress.  Her head and neck and upper torso and arms failed to show any significant abnormalities.    Assessment/Plan   Macie was seen today for hypothyroidism.    Diagnoses and all orders for this visit:    Hypothyroidism due to Hashimoto's thyroiditis  -     T3, Free  -     T4 & TSH ([facility])  -     T4, Free  -     Thyroglobulin With Anti-TG  -     Uric Acid  -     Vitamin D 25 Hydroxy  -     Comprehensive Metabolic Panel  -     C-Peptide  -     Hemoglobin A1c  -     MicroAlbumin, Urine, Random - Urine, Clean Catch  -     Prolactin  -     ACTH  -     Cortisol  -     NMR LipoProfile  -     T4 & TSH  ([facility]); Future  -     T3, Free; Future  -     T4, Free; Future  -     Thyroglobulin With Anti-TG; Future  -     Uric Acid; Future  -     Vitamin D 25 Hydroxy; Future  -     Comprehensive Metabolic Panel; Future  -     C-Peptide; Future  -     Follicle Stimulating Hormone; Future  -     Hemoglobin A1c; Future  -     Luteinizing Hormone; Future  -     MicroAlbumin, Urine, Random - Urine, Clean Catch; Future  -     NMR LipoProfile; Future    Essential hypertension  -     T3, Free  -     T4 & TSH ([facility])  -     T4, Free  -     Thyroglobulin With Anti-TG  -     Uric Acid  -     Vitamin D 25 Hydroxy  -     Comprehensive Metabolic Panel  -     C-Peptide  -     Hemoglobin A1c  -     MicroAlbumin, Urine, Random - Urine, Clean Catch  -     Prolactin  -     ACTH  -     Cortisol  -     NMR LipoProfile  -     T4 & TSH ([facility]); Future  -     T3, Free; Future  -     T4, Free; Future  -     Thyroglobulin With Anti-TG; Future  -     Uric Acid; Future  -     Vitamin D 25 Hydroxy; Future  -     Comprehensive Metabolic Panel; Future  -     C-Peptide; Future  -     Follicle Stimulating Hormone; Future  -     Hemoglobin A1c; Future  -     Luteinizing Hormone; Future  -     MicroAlbumin, Urine, Random - Urine, Clean Catch; Future  -     NMR LipoProfile; Future    Mixed dyslipidemia  -     T3, Free  -     T4 & TSH ([facility])  -     T4, Free  -     Thyroglobulin With Anti-TG  -     Uric Acid  -     Vitamin D 25 Hydroxy  -     Comprehensive Metabolic Panel  -     C-Peptide  -     Hemoglobin A1c  -     MicroAlbumin, Urine, Random - Urine, Clean Catch  -     Prolactin  -     ACTH  -     Cortisol  -     NMR LipoProfile  -     T4 & TSH ([facility]); Future  -     T3, Free; Future  -     T4, Free; Future  -     Thyroglobulin With Anti-TG; Future  -     Uric Acid; Future  -     Vitamin D 25 Hydroxy; Future  -     Comprehensive Metabolic Panel; Future  -     C-Peptide; Future  -     Follicle Stimulating Hormone; Future  -     Hemoglobin A1c;  Future  -     Luteinizing Hormone; Future  -     MicroAlbumin, Urine, Random - Urine, Clean Catch; Future  -     NMR LipoProfile; Future    Vitamin D deficiency  -     T3, Free  -     T4 & TSH ([facility])  -     T4, Free  -     Thyroglobulin With Anti-TG  -     Uric Acid  -     Vitamin D 25 Hydroxy  -     Comprehensive Metabolic Panel  -     C-Peptide  -     Hemoglobin A1c  -     MicroAlbumin, Urine, Random - Urine, Clean Catch  -     Prolactin  -     ACTH  -     Cortisol  -     NMR LipoProfile  -     T4 & TSH ([facility]); Future  -     T3, Free; Future  -     T4, Free; Future  -     Thyroglobulin With Anti-TG; Future  -     Uric Acid; Future  -     Vitamin D 25 Hydroxy; Future  -     Comprehensive Metabolic Panel; Future  -     C-Peptide; Future  -     Follicle Stimulating Hormone; Future  -     Hemoglobin A1c; Future  -     Luteinizing Hormone; Future  -     MicroAlbumin, Urine, Random - Urine, Clean Catch; Future  -     NMR LipoProfile; Future    Symptomatic menopausal or female climacteric states  -     T3, Free  -     T4 & TSH ([facility])  -     T4, Free  -     Thyroglobulin With Anti-TG  -     Uric Acid  -     Vitamin D 25 Hydroxy  -     Comprehensive Metabolic Panel  -     C-Peptide  -     Hemoglobin A1c  -     MicroAlbumin, Urine, Random - Urine, Clean Catch  -     Prolactin  -     ACTH  -     Cortisol  -     NMR LipoProfile  -     T4 & TSH ([facility]); Future  -     T3, Free; Future  -     T4, Free; Future  -     Thyroglobulin With Anti-TG; Future  -     Uric Acid; Future  -     Vitamin D 25 Hydroxy; Future  -     Comprehensive Metabolic Panel; Future  -     C-Peptide; Future  -     Follicle Stimulating Hormone; Future  -     Hemoglobin A1c; Future  -     Luteinizing Hormone; Future  -     MicroAlbumin, Urine, Random - Urine, Clean Catch; Future  -     NMR LipoProfile; Future        In summary I saw this 49-year-old female for above-mentioned problems.  I reviewed her laboratory evaluations of 11/25/2019 and  2/7/2020 as well as ultrasound of her thyroid dated 2/17/2020 which shows multinodular goiter consistent with Hashimoto thyroiditis.  At this time I am going to order extensive laboratory evaluations for which she will come to the office on 4/28/2020 and as soon as the results become available we will go ahead and inform her of any possible changes in her medications or additional work-up.  I will see her in 6 months or sooner if needed but laboratory evaluation prior to each office visit.

## 2020-05-04 LAB
25(OH)D3+25(OH)D2 SERPL-MCNC: 41.1 NG/ML (ref 30–100)
ACTH PLAS-MCNC: 11.5 PG/ML (ref 7.2–63.3)
ALBUMIN SERPL-MCNC: 4.5 G/DL (ref 3.5–5.2)
ALBUMIN/GLOB SERPL: 1.8 G/DL
ALP SERPL-CCNC: 66 U/L (ref 39–117)
ALT SERPL-CCNC: 14 U/L (ref 1–33)
AST SERPL-CCNC: 14 U/L (ref 1–32)
BILIRUB SERPL-MCNC: 0.4 MG/DL (ref 0.2–1.2)
BUN SERPL-MCNC: 13 MG/DL (ref 6–20)
BUN/CREAT SERPL: 19.4 (ref 7–25)
C PEPTIDE SERPL-MCNC: 2.3 NG/ML (ref 1.1–4.4)
CALCIUM SERPL-MCNC: 9.5 MG/DL (ref 8.6–10.5)
CHLORIDE SERPL-SCNC: 99 MMOL/L (ref 98–107)
CHOLEST SERPL-MCNC: 272 MG/DL (ref 100–199)
CO2 SERPL-SCNC: 26.3 MMOL/L (ref 22–29)
CORTIS SERPL-MCNC: 13.9 UG/DL
CREAT SERPL-MCNC: 0.67 MG/DL (ref 0.57–1)
GLOBULIN SER CALC-MCNC: 2.5 GM/DL
GLUCOSE SERPL-MCNC: 106 MG/DL (ref 65–99)
HBA1C MFR BLD: 5.15 % (ref 4.8–5.6)
HDL SERPL-SCNC: 52.9 UMOL/L
HDLC SERPL-MCNC: 71 MG/DL
LDL SERPL QN: 21.1 NM
LDL SERPL-SCNC: 2332 NMOL/L
LDL SMALL SERPL-SCNC: 711 NMOL/L
LDLC SERPL CALC-MCNC: 172 MG/DL (ref 0–99)
MICROALBUMIN UR-MCNC: 4.1 UG/ML
POTASSIUM SERPL-SCNC: 4.1 MMOL/L (ref 3.5–5.2)
PROLACTIN SERPL-MCNC: 7.3 NG/ML (ref 4.8–23.3)
PROT SERPL-MCNC: 7 G/DL (ref 6–8.5)
SODIUM SERPL-SCNC: 139 MMOL/L (ref 136–145)
T3FREE SERPL-MCNC: 2.7 PG/ML (ref 2–4.4)
T4 FREE SERPL-MCNC: 1.14 NG/DL (ref 0.93–1.7)
T4 SERPL-MCNC: 6.56 MCG/DL (ref 4.5–11.7)
THYROGLOB AB SERPL-ACNC: 11.5 IU/ML (ref 0–0.9)
THYROGLOB SERPL-MCNC: 5.2 NG/ML
TRIGL SERPL-MCNC: 147 MG/DL (ref 0–149)
TSH SERPL DL<=0.005 MIU/L-ACNC: 2.42 UIU/ML (ref 0.27–4.2)
URATE SERPL-MCNC: 6.6 MG/DL (ref 2.4–5.7)

## 2020-05-04 RX ORDER — ROSUVASTATIN CALCIUM 20 MG/1
20 TABLET, COATED ORAL NIGHTLY
Qty: 90 TABLET | Refills: 3 | Status: SHIPPED | OUTPATIENT
Start: 2020-05-04 | End: 2020-05-07 | Stop reason: HOSPADM

## 2020-05-04 RX ORDER — LISINOPRIL 10 MG/1
10 TABLET ORAL DAILY
Qty: 30 TABLET | Refills: 2 | Status: SHIPPED | OUTPATIENT
Start: 2020-05-04 | End: 2020-07-29

## 2020-05-06 ENCOUNTER — APPOINTMENT (OUTPATIENT)
Dept: GENERAL RADIOLOGY | Facility: HOSPITAL | Age: 50
End: 2020-05-06

## 2020-05-06 ENCOUNTER — HOSPITAL ENCOUNTER (OUTPATIENT)
Facility: HOSPITAL | Age: 50
Setting detail: OBSERVATION
Discharge: HOME | End: 2020-05-07
Attending: EMERGENCY MEDICINE | Admitting: HOSPITALIST

## 2020-05-06 ENCOUNTER — APPOINTMENT (OUTPATIENT)
Dept: CARDIOLOGY | Facility: HOSPITAL | Age: 50
End: 2020-05-06

## 2020-05-06 ENCOUNTER — TELEPHONE (OUTPATIENT)
Dept: FAMILY MEDICINE CLINIC | Facility: CLINIC | Age: 50
End: 2020-05-06

## 2020-05-06 ENCOUNTER — APPOINTMENT (OUTPATIENT)
Dept: CT IMAGING | Facility: HOSPITAL | Age: 50
End: 2020-05-06

## 2020-05-06 ENCOUNTER — NURSE TRIAGE (OUTPATIENT)
Dept: CALL CENTER | Facility: HOSPITAL | Age: 50
End: 2020-05-06

## 2020-05-06 DIAGNOSIS — R55 NEAR SYNCOPE: ICD-10-CM

## 2020-05-06 DIAGNOSIS — R47.01 EXPRESSIVE APHASIA: Primary | ICD-10-CM

## 2020-05-06 DIAGNOSIS — E06.3 HASHIMOTO'S DISEASE: ICD-10-CM

## 2020-05-06 PROBLEM — R53.82 CHRONIC FATIGUE: Status: ACTIVE | Noted: 2020-05-06

## 2020-05-06 LAB
ABO GROUP BLD: NORMAL
ALBUMIN SERPL-MCNC: 4.8 G/DL (ref 3.5–5.2)
ALBUMIN/GLOB SERPL: 1.6 G/DL
ALP SERPL-CCNC: 77 U/L (ref 39–117)
ALT SERPL W P-5'-P-CCNC: 18 U/L (ref 1–33)
ANION GAP SERPL CALCULATED.3IONS-SCNC: 16.1 MMOL/L (ref 5–15)
AORTIC DIMENSIONLESS INDEX: 0.8 (DI)
APTT PPP: 29.5 SECONDS (ref 22.7–35.4)
AST SERPL-CCNC: 13 U/L (ref 1–32)
BASOPHILS # BLD AUTO: 0.04 10*3/MM3 (ref 0–0.2)
BASOPHILS NFR BLD AUTO: 0.5 % (ref 0–1.5)
BH CV ECHO MEAS - ACS: 1.8 CM
BH CV ECHO MEAS - AO MAX PG: 7 MMHG
BH CV ECHO MEAS - AO MEAN PG (FULL): 2 MMHG
BH CV ECHO MEAS - AO MEAN PG: 4 MMHG
BH CV ECHO MEAS - AO ROOT AREA (BSA CORRECTED): 1.5
BH CV ECHO MEAS - AO ROOT AREA: 5.3 CM^2
BH CV ECHO MEAS - AO ROOT DIAM: 2.6 CM
BH CV ECHO MEAS - AO V2 MAX: 127 CM/SEC
BH CV ECHO MEAS - AO V2 MEAN: 91.6 CM/SEC
BH CV ECHO MEAS - AO V2 VTI: 26.1 CM
BH CV ECHO MEAS - AVA(I,A): 2.4 CM^2
BH CV ECHO MEAS - AVA(I,D): 2.4 CM^2
BH CV ECHO MEAS - BSA(HAYCOCK): 1.8 M^2
BH CV ECHO MEAS - BSA: 1.7 M^2
BH CV ECHO MEAS - BZI_BMI: 29.6 KILOGRAMS/M^2
BH CV ECHO MEAS - BZI_METRIC_HEIGHT: 157.5 CM
BH CV ECHO MEAS - BZI_METRIC_WEIGHT: 73.5 KG
BH CV ECHO MEAS - EDV(CUBED): 46.7 ML
BH CV ECHO MEAS - EDV(MOD-SP2): 88 ML
BH CV ECHO MEAS - EDV(MOD-SP4): 94 ML
BH CV ECHO MEAS - EDV(TEICH): 54.4 ML
BH CV ECHO MEAS - EF(CUBED): 70.4 %
BH CV ECHO MEAS - EF(MOD-BP): 75 %
BH CV ECHO MEAS - EF(MOD-SP2): 72.7 %
BH CV ECHO MEAS - EF(MOD-SP4): 78.7 %
BH CV ECHO MEAS - EF(TEICH): 63 %
BH CV ECHO MEAS - ESV(CUBED): 13.8 ML
BH CV ECHO MEAS - ESV(MOD-SP2): 24 ML
BH CV ECHO MEAS - ESV(MOD-SP4): 20 ML
BH CV ECHO MEAS - ESV(TEICH): 20.2 ML
BH CV ECHO MEAS - FS: 33.3 %
BH CV ECHO MEAS - IVS/LVPW: 0.91
BH CV ECHO MEAS - IVSD: 1 CM
BH CV ECHO MEAS - LA DIMENSION: 3.5 CM
BH CV ECHO MEAS - LA/AO: 1.3
BH CV ECHO MEAS - LAT PEAK E' VEL: 8.6 CM/SEC
BH CV ECHO MEAS - LV DIASTOLIC VOL/BSA (35-75): 53.8 ML/M^2
BH CV ECHO MEAS - LV MASS(C)D: 115.9 GRAMS
BH CV ECHO MEAS - LV MASS(C)DI: 66.3 GRAMS/M^2
BH CV ECHO MEAS - LV MEAN PG: 2 MMHG
BH CV ECHO MEAS - LV SYSTOLIC VOL/BSA (12-30): 11.4 ML/M^2
BH CV ECHO MEAS - LV V1 MAX: 89 CM/SEC
BH CV ECHO MEAS - LV V1 MEAN: 64.9 CM/SEC
BH CV ECHO MEAS - LV V1 VTI: 19.7 CM
BH CV ECHO MEAS - LVIDD: 3.6 CM
BH CV ECHO MEAS - LVIDS: 2.4 CM
BH CV ECHO MEAS - LVLD AP2: 7.6 CM
BH CV ECHO MEAS - LVLD AP4: 7.5 CM
BH CV ECHO MEAS - LVLS AP2: 5.7 CM
BH CV ECHO MEAS - LVLS AP4: 5.4 CM
BH CV ECHO MEAS - LVOT AREA (M): 3.1 CM^2
BH CV ECHO MEAS - LVOT AREA: 3.1 CM^2
BH CV ECHO MEAS - LVOT DIAM: 2 CM
BH CV ECHO MEAS - LVPWD: 1.1 CM
BH CV ECHO MEAS - MED PEAK E' VEL: 7.7 CM/SEC
BH CV ECHO MEAS - MV A DUR: 0.2 SEC
BH CV ECHO MEAS - MV A MAX VEL: 114 CM/SEC
BH CV ECHO MEAS - MV DEC SLOPE: 338 CM/SEC^2
BH CV ECHO MEAS - MV DEC TIME: 0.29 SEC
BH CV ECHO MEAS - MV E MAX VEL: 79 CM/SEC
BH CV ECHO MEAS - MV E/A: 0.69
BH CV ECHO MEAS - MV MEAN PG: 2 MMHG
BH CV ECHO MEAS - MV P1/2T MAX VEL: 83.4 CM/SEC
BH CV ECHO MEAS - MV P1/2T: 72.3 MSEC
BH CV ECHO MEAS - MV V2 MEAN: 60.8 CM/SEC
BH CV ECHO MEAS - MV V2 VTI: 26.4 CM
BH CV ECHO MEAS - MVA P1/2T LCG: 2.6 CM^2
BH CV ECHO MEAS - MVA(P1/2T): 3 CM^2
BH CV ECHO MEAS - MVA(VTI): 2.3 CM^2
BH CV ECHO MEAS - PA ACC SLOPE: 674 CM/SEC^2
BH CV ECHO MEAS - PA ACC TIME: 0.1 SEC
BH CV ECHO MEAS - PA MAX PG (FULL): 0.97 MMHG
BH CV ECHO MEAS - PA MAX PG: 2.2 MMHG
BH CV ECHO MEAS - PA PR(ACCEL): 33.1 MMHG
BH CV ECHO MEAS - PA V2 MAX: 73.7 CM/SEC
BH CV ECHO MEAS - PULM A REVS DUR: 0.18 SEC
BH CV ECHO MEAS - PULM A REVS VEL: 26.7 CM/SEC
BH CV ECHO MEAS - PULM DIAS VEL: 32.1 CM/SEC
BH CV ECHO MEAS - PULM S/D: 1.6
BH CV ECHO MEAS - PULM SYS VEL: 50.3 CM/SEC
BH CV ECHO MEAS - PVA(V,A): 1.7 CM^2
BH CV ECHO MEAS - PVA(V,D): 1.7 CM^2
BH CV ECHO MEAS - QP/QS: 0.42
BH CV ECHO MEAS - RAP SYSTOLE: 3 MMHG
BH CV ECHO MEAS - RV MAX PG: 1.2 MMHG
BH CV ECHO MEAS - RV MEAN PG: 1 MMHG
BH CV ECHO MEAS - RV V1 MAX: 54.9 CM/SEC
BH CV ECHO MEAS - RV V1 MEAN: 39.5 CM/SEC
BH CV ECHO MEAS - RV V1 VTI: 11.5 CM
BH CV ECHO MEAS - RVOT AREA: 2.3 CM^2
BH CV ECHO MEAS - RVOT DIAM: 1.7 CM
BH CV ECHO MEAS - RVSP: 20 MMHG
BH CV ECHO MEAS - SI(AO): 79.3 ML/M^2
BH CV ECHO MEAS - SI(CUBED): 18.8 ML/M^2
BH CV ECHO MEAS - SI(LVOT): 35.4 ML/M^2
BH CV ECHO MEAS - SI(MOD-SP2): 36.6 ML/M^2
BH CV ECHO MEAS - SI(MOD-SP4): 42.3 ML/M^2
BH CV ECHO MEAS - SI(TEICH): 19.6 ML/M^2
BH CV ECHO MEAS - SV(AO): 138.6 ML
BH CV ECHO MEAS - SV(CUBED): 32.8 ML
BH CV ECHO MEAS - SV(LVOT): 61.9 ML
BH CV ECHO MEAS - SV(MOD-SP2): 64 ML
BH CV ECHO MEAS - SV(MOD-SP4): 74 ML
BH CV ECHO MEAS - SV(RVOT): 26.1 ML
BH CV ECHO MEAS - SV(TEICH): 34.3 ML
BH CV ECHO MEAS - TAPSE (>1.6): 2.7 CM2
BH CV ECHO MEAS - TR MAX VEL: 206 CM/SEC
BH CV ECHO MEASUREMENTS AVERAGE E/E' RATIO: 9.69
BH CV XLRA - RV BASE: 3.2 CM
BH CV XLRA - RV LENGTH: 6.8 CM
BH CV XLRA - RV MID: 2.4 CM
BH CV XLRA - TDI S': 16.1 CM/SEC
BILIRUB SERPL-MCNC: 0.4 MG/DL (ref 0.2–1.2)
BLD GP AB SCN SERPL QL: NEGATIVE
BUN BLD-MCNC: 10 MG/DL (ref 6–20)
BUN/CREAT SERPL: 14.5 (ref 7–25)
CALCIUM SPEC-SCNC: 9.7 MG/DL (ref 8.6–10.5)
CHLORIDE SERPL-SCNC: 95 MMOL/L (ref 98–107)
CO2 SERPL-SCNC: 22.9 MMOL/L (ref 22–29)
CREAT BLD-MCNC: 0.69 MG/DL (ref 0.57–1)
DEPRECATED RDW RBC AUTO: 44 FL (ref 37–54)
EOSINOPHIL # BLD AUTO: 0.12 10*3/MM3 (ref 0–0.4)
EOSINOPHIL NFR BLD AUTO: 1.6 % (ref 0.3–6.2)
ERYTHROCYTE [DISTWIDTH] IN BLOOD BY AUTOMATED COUNT: 12.5 % (ref 12.3–15.4)
GFR SERPL CREATININE-BSD FRML MDRD: 90 ML/MIN/1.73
GLOBULIN UR ELPH-MCNC: 3 GM/DL
GLUCOSE BLD-MCNC: 123 MG/DL (ref 65–99)
GLUCOSE BLDC GLUCOMTR-MCNC: 103 MG/DL (ref 70–130)
HCT VFR BLD AUTO: 42.1 % (ref 34–46.6)
HGB BLD-MCNC: 14.7 G/DL (ref 12–15.9)
HOLD SPECIMEN: NORMAL
HOLD SPECIMEN: NORMAL
IMM GRANULOCYTES # BLD AUTO: 0.02 10*3/MM3 (ref 0–0.05)
IMM GRANULOCYTES NFR BLD AUTO: 0.3 % (ref 0–0.5)
INR PPP: 0.88 (ref 0.9–1.1)
LEFT ATRIUM VOLUME INDEX: 23 ML/M2
LYMPHOCYTES # BLD AUTO: 2.42 10*3/MM3 (ref 0.7–3.1)
LYMPHOCYTES NFR BLD AUTO: 31.6 % (ref 19.6–45.3)
MAXIMAL PREDICTED HEART RATE: 170 BPM
MCH RBC QN AUTO: 33.3 PG (ref 26.6–33)
MCHC RBC AUTO-ENTMCNC: 34.9 G/DL (ref 31.5–35.7)
MCV RBC AUTO: 95.2 FL (ref 79–97)
MONOCYTES # BLD AUTO: 0.7 10*3/MM3 (ref 0.1–0.9)
MONOCYTES NFR BLD AUTO: 9.1 % (ref 5–12)
NEUTROPHILS # BLD AUTO: 4.37 10*3/MM3 (ref 1.7–7)
NEUTROPHILS NFR BLD AUTO: 56.9 % (ref 42.7–76)
NRBC BLD AUTO-RTO: 0 /100 WBC (ref 0–0.2)
PLATELET # BLD AUTO: 294 10*3/MM3 (ref 140–450)
PMV BLD AUTO: 10.5 FL (ref 6–12)
POTASSIUM BLD-SCNC: 3.5 MMOL/L (ref 3.5–5.2)
PROT SERPL-MCNC: 7.8 G/DL (ref 6–8.5)
PROTHROMBIN TIME: 11.7 SECONDS (ref 11.7–14.2)
RBC # BLD AUTO: 4.42 10*6/MM3 (ref 3.77–5.28)
RH BLD: POSITIVE
SODIUM BLD-SCNC: 134 MMOL/L (ref 136–145)
STRESS TARGET HR: 145 BPM
T&S EXPIRATION DATE: NORMAL
TROPONIN T SERPL-MCNC: <0.01 NG/ML (ref 0–0.03)
TSH SERPL DL<=0.05 MIU/L-ACNC: 5.72 UIU/ML (ref 0.27–4.2)
WBC NRBC COR # BLD: 7.67 10*3/MM3 (ref 3.4–10.8)
WHOLE BLOOD HOLD SPECIMEN: NORMAL
WHOLE BLOOD HOLD SPECIMEN: NORMAL

## 2020-05-06 PROCEDURE — G0378 HOSPITAL OBSERVATION PER HR: HCPCS

## 2020-05-06 PROCEDURE — 70498 CT ANGIOGRAPHY NECK: CPT

## 2020-05-06 PROCEDURE — 93306 TTE W/DOPPLER COMPLETE: CPT | Performed by: INTERNAL MEDICINE

## 2020-05-06 PROCEDURE — 96375 TX/PRO/DX INJ NEW DRUG ADDON: CPT

## 2020-05-06 PROCEDURE — 84443 ASSAY THYROID STIM HORMONE: CPT | Performed by: PHYSICIAN ASSISTANT

## 2020-05-06 PROCEDURE — 99285 EMERGENCY DEPT VISIT HI MDM: CPT

## 2020-05-06 PROCEDURE — 85730 THROMBOPLASTIN TIME PARTIAL: CPT | Performed by: PHYSICIAN ASSISTANT

## 2020-05-06 PROCEDURE — 70450 CT HEAD/BRAIN W/O DYE: CPT

## 2020-05-06 PROCEDURE — 86900 BLOOD TYPING SEROLOGIC ABO: CPT | Performed by: PHYSICIAN ASSISTANT

## 2020-05-06 PROCEDURE — 0 IOPAMIDOL PER 1 ML: Performed by: INTERNAL MEDICINE

## 2020-05-06 PROCEDURE — 86850 RBC ANTIBODY SCREEN: CPT | Performed by: PHYSICIAN ASSISTANT

## 2020-05-06 PROCEDURE — 85610 PROTHROMBIN TIME: CPT | Performed by: PHYSICIAN ASSISTANT

## 2020-05-06 PROCEDURE — 96361 HYDRATE IV INFUSION ADD-ON: CPT

## 2020-05-06 PROCEDURE — 80053 COMPREHEN METABOLIC PANEL: CPT | Performed by: PHYSICIAN ASSISTANT

## 2020-05-06 PROCEDURE — 82962 GLUCOSE BLOOD TEST: CPT

## 2020-05-06 PROCEDURE — 84484 ASSAY OF TROPONIN QUANT: CPT | Performed by: PHYSICIAN ASSISTANT

## 2020-05-06 PROCEDURE — 25010000002 PERFLUTREN (DEFINITY) 8.476 MG IN SODIUM CHLORIDE (PF) 0.9 % 10 ML INJECTION: Performed by: INTERNAL MEDICINE

## 2020-05-06 PROCEDURE — 93005 ELECTROCARDIOGRAM TRACING: CPT | Performed by: PHYSICIAN ASSISTANT

## 2020-05-06 PROCEDURE — 85025 COMPLETE CBC W/AUTO DIFF WBC: CPT | Performed by: PHYSICIAN ASSISTANT

## 2020-05-06 PROCEDURE — 70496 CT ANGIOGRAPHY HEAD: CPT

## 2020-05-06 PROCEDURE — 93010 ELECTROCARDIOGRAM REPORT: CPT | Performed by: INTERNAL MEDICINE

## 2020-05-06 PROCEDURE — 96374 THER/PROPH/DIAG INJ IV PUSH: CPT

## 2020-05-06 PROCEDURE — 93306 TTE W/DOPPLER COMPLETE: CPT

## 2020-05-06 PROCEDURE — 71045 X-RAY EXAM CHEST 1 VIEW: CPT

## 2020-05-06 PROCEDURE — 25010000002 ONDANSETRON PER 1 MG: Performed by: NURSE PRACTITIONER

## 2020-05-06 PROCEDURE — 92610 EVALUATE SWALLOWING FUNCTION: CPT

## 2020-05-06 PROCEDURE — 86901 BLOOD TYPING SEROLOGIC RH(D): CPT | Performed by: PHYSICIAN ASSISTANT

## 2020-05-06 RX ORDER — SODIUM CHLORIDE 9 MG/ML
75 INJECTION, SOLUTION INTRAVENOUS CONTINUOUS
Status: DISCONTINUED | OUTPATIENT
Start: 2020-05-06 | End: 2020-05-07 | Stop reason: HOSPADM

## 2020-05-06 RX ORDER — LEVOTHYROXINE SODIUM 75 UG/1
75 CAPSULE ORAL DAILY
Qty: 30 CAPSULE | Refills: 11 | Status: SHIPPED | OUTPATIENT
Start: 2020-05-06 | End: 2021-05-03 | Stop reason: SDUPTHER

## 2020-05-06 RX ORDER — LABETALOL HYDROCHLORIDE 5 MG/ML
10 INJECTION, SOLUTION INTRAVENOUS
Status: DISCONTINUED | OUTPATIENT
Start: 2020-05-06 | End: 2020-05-07 | Stop reason: HOSPADM

## 2020-05-06 RX ORDER — ACETAMINOPHEN 325 MG/1
650 TABLET ORAL EVERY 4 HOURS PRN
Status: DISCONTINUED | OUTPATIENT
Start: 2020-05-06 | End: 2020-05-07 | Stop reason: HOSPADM

## 2020-05-06 RX ORDER — ASPIRIN 325 MG
325 TABLET ORAL DAILY
Status: DISCONTINUED | OUTPATIENT
Start: 2020-05-06 | End: 2020-05-07

## 2020-05-06 RX ORDER — ONDANSETRON 2 MG/ML
4 INJECTION INTRAMUSCULAR; INTRAVENOUS EVERY 6 HOURS PRN
Status: DISCONTINUED | OUTPATIENT
Start: 2020-05-06 | End: 2020-05-07 | Stop reason: HOSPADM

## 2020-05-06 RX ORDER — BISACODYL 10 MG
10 SUPPOSITORY, RECTAL RECTAL DAILY PRN
Status: DISCONTINUED | OUTPATIENT
Start: 2020-05-06 | End: 2020-05-07 | Stop reason: HOSPADM

## 2020-05-06 RX ORDER — LABETALOL HYDROCHLORIDE 5 MG/ML
10 INJECTION, SOLUTION INTRAVENOUS ONCE
Status: COMPLETED | OUTPATIENT
Start: 2020-05-06 | End: 2020-05-06

## 2020-05-06 RX ORDER — ATORVASTATIN CALCIUM 80 MG/1
80 TABLET, FILM COATED ORAL NIGHTLY
Status: DISCONTINUED | OUTPATIENT
Start: 2020-05-06 | End: 2020-05-07 | Stop reason: HOSPADM

## 2020-05-06 RX ORDER — SODIUM CHLORIDE 0.9 % (FLUSH) 0.9 %
10 SYRINGE (ML) INJECTION AS NEEDED
Status: DISCONTINUED | OUTPATIENT
Start: 2020-05-06 | End: 2020-05-07 | Stop reason: HOSPADM

## 2020-05-06 RX ORDER — ACETAMINOPHEN 650 MG/1
650 SUPPOSITORY RECTAL EVERY 4 HOURS PRN
Status: DISCONTINUED | OUTPATIENT
Start: 2020-05-06 | End: 2020-05-07 | Stop reason: HOSPADM

## 2020-05-06 RX ORDER — ASPIRIN 300 MG/1
300 SUPPOSITORY RECTAL DAILY
Status: DISCONTINUED | OUTPATIENT
Start: 2020-05-06 | End: 2020-05-07

## 2020-05-06 RX ADMIN — ATORVASTATIN CALCIUM 80 MG: 80 TABLET, FILM COATED ORAL at 20:15

## 2020-05-06 RX ADMIN — LABETALOL HYDROCHLORIDE 10 MG: 5 INJECTION, SOLUTION INTRAVENOUS at 10:13

## 2020-05-06 RX ADMIN — ONDANSETRON 4 MG: 2 INJECTION INTRAMUSCULAR; INTRAVENOUS at 23:22

## 2020-05-06 RX ADMIN — ASPIRIN 325 MG: 325 TABLET ORAL at 20:14

## 2020-05-06 RX ADMIN — IOPAMIDOL 100 ML: 755 INJECTION, SOLUTION INTRAVENOUS at 17:40

## 2020-05-06 RX ADMIN — ACETAMINOPHEN 650 MG: 325 TABLET, FILM COATED ORAL at 23:22

## 2020-05-06 RX ADMIN — SODIUM CHLORIDE 75 ML/HR: 9 INJECTION, SOLUTION INTRAVENOUS at 16:56

## 2020-05-06 RX ADMIN — PERFLUTREN 2 ML: 6.52 INJECTION, SUSPENSION INTRAVENOUS at 15:00

## 2020-05-06 NOTE — PLAN OF CARE
"  Problem: Patient Care Overview  Goal: Plan of Care Review  Outcome: Ongoing (interventions implemented as appropriate)  Flowsheets (Taken 5/6/2020 2653)  Plan of Care Reviewed With: patient  Outcome Summary: Bedside swallow eval completed. Recommend mechanical soft and thin, meds whole with thin or puree. May advance to regular as tolerating and patient's energy/endurance improves. Recommend upright and slow rate of intake. Patient report she follows an \"A and I\" diet, no soy, dairy, or gluten. ST to follow for need for speech eval pending work up.     "

## 2020-05-06 NOTE — TELEPHONE ENCOUNTER
Patient called answering service.  Feeling faint, feels like heart palpitations.    BP is 177/125, pulse 97.  Would like a call back from Dr. Bob

## 2020-05-06 NOTE — ED PROVIDER NOTES
10:49  Patient seen and examined with physician assistant.  Briefly patient presents for an episode of near syncope and word finding issues.  Patient states she had a meeting at work.  At that time she became very lightheaded and near syncopal.  Patient did not fall or hurt herself.  Patient states shortly afterwards she had word finding difficulty.  Had no focal weakness or numbness.  Patient states symptoms have improved but still having problems with word finding      On exam patient's heart is regular rhythm.  Lungs are clear bilaterally.  She has no focal weakness.  Her speech is slow but appropriate      EKG          EKG time: 1017  Rhythm/Rate: Normal sinus rhythm 86  P waves and ME: Normal P wave  QRS, axis: Normal QRS  ST and T waves: Normal ST T waves    Interpreted Contemporaneously by me, independently viewed   Unchanged compared to prior 11/4/19    Plan is admission    MD ATTESTATION NOTE    The NICOLASA and I have discussed this patient's history, physical exam, and treatment plan.  I have reviewed the documentation and personally had a face to face interaction with the patient. I affirm the documentation and agree with the treatment and plan.  The attached note describes my personal findings.         Jarocho Royal MD  05/06/20 [id number]

## 2020-05-06 NOTE — TELEPHONE ENCOUNTER
"Call from the [facility], caller had a near sycopal episode this morning, states she feels \"weird\", having trouble with her thought processes, bp is elevated 166/148, was the last reading. Will be going to ED    Reason for Disposition  • [1] Systolic BP  >= 160 OR Diastolic >= 100 AND [2] cardiac or neurologic symptoms (e.g., chest pain, difficulty breathing, unsteady gait, blurred vision)    Additional Information  • Negative: Difficult to awaken or acting confused (e.g., disoriented, slurred speech)  • Negative: Severe difficulty breathing (e.g., struggling for each breath, speaks in single words)  • Negative: [1] Weakness of the face, arm or leg on one side of the body AND [2] new onset  • Negative: [1] Numbness (i.e., loss of sensation) of the face, arm or leg on one side of the body AND [2] new onset  • Negative: [1] Chest pain lasts > 5 minutes AND [2] history of heart disease  (i.e., heart attack, bypass surgery, angina, angioplasty, CHF)  • Negative: [1] Chest pain AND [2] took nitrogylcerin AND [3] pain was not relieved  • Negative: Sounds like a life-threatening emergency to the triager  • Negative: Symptom is main concern  (e.g., headache, chest pain)  • Negative: Low blood pressure is main concern    Answer Assessment - Initial Assessment Questions  1. BLOOD PRESSURE: \"What is the blood pressure?\" \"Did you take at least two measurements 5 minutes apart?\"      177/125  And 166/148  2. ONSET: \"When did you take your blood pressure?\"  This morning  3. HOW: \"How did you obtain the blood pressure?\" (e.g., visiting nurse, automatic home BP monitor)       automatic  4. HISTORY: \"Do you have a history of high blood pressure?\"      yes  5. MEDICATIONS: \"Are you taking any medications for blood pressure?\" \"Have you missed any doses recently?\"      no  6. OTHER SYMPTOMS: \"Do you have any symptoms?\" (e.g., headache, chest pain, blurred vision, difficulty breathing, weakness)       Near sycope, nauseated  7. PREGNANCY: \"Is " "there any chance you are pregnant?\" \"When was your last menstrual period?\"      no    Protocols used: HIGH BLOOD PRESSURE-ADULT-AH      "

## 2020-05-06 NOTE — PLAN OF CARE
Problem: Patient Care Overview  Goal: Plan of Care Review  Outcome: Ongoing (interventions implemented as appropriate)  Flowsheets (Taken 5/6/2020 1351)  Progress: no change  Plan of Care Reviewed With: patient  Note:   Patient alert and oriented x 4.  Admitted to the floor for near syncope.  NIH = 1.  VSS.  Will continue to monitor.     Problem: Fall Risk (Adult)  Goal: Absence of Fall  Outcome: Ongoing (interventions implemented as appropriate)  Flowsheets (Taken 5/6/2020 1357)  Absence of Fall: making progress toward outcome     Problem: Stroke (Ischemic) (Adult)  Goal: Signs and Symptoms of Listed Potential Problems Will be Absent, Minimized or Managed (Stroke)  Outcome: Ongoing (interventions implemented as appropriate)  Flowsheets (Taken 5/6/2020 1726)  Problems Assessed (Stroke (Ischemic)): all  Problems Assessed (Stroke (Ischemic)): acute neurologic deterioration

## 2020-05-06 NOTE — ED PROVIDER NOTES
"EMERGENCY DEPARTMENT ENCOUNTER    Room Number:  N540/1  Date seen:  5/6/2020  Time seen: 10:03 AM  PCP: James Bob MD  Historian: Patient    HPI:  Chief complaint: Near syncope  Context:Macie Sarabia is a 49 y.o. female, hx of hypothyroid, who presents to the ED with c/o near syncopal episode at 6:15 AM.  She reports around 7 AM she started developing \"trouble talking,\" she states \"I know what I want to say but sometimes it just will not come out.\"  Associated symptoms include nausea and generalized weakness.  Patient was prescribed lisinopril 2 days ago but has not started the medication.  Patient denies headache, blurry vision, unilateral weakness or other focal neuro deficits.    Timing: Gradual  Duration: 2 hours ago  Quality: Expressive aphasia  Intensity/Severity: Mild  Associated Symptoms: Nausea  Aggravating Factors: None  Alleviating Factors: None  Previous Episodes: None  Treatment before arrival: None    Patient was placed in face mask in first look. Patient was wearing facemask when I entered the room and throughout our encounter. I wore full protective equipment throughout this patient encounter including a face mask, and gloves. Hand hygiene was performed before donning protective equipment and after removal when leaving the room.      MEDICAL RECORD REVIEW  Reviewed old records.  Patient had a upper GI endoscopy done by Dr. Gross on January 2020.    ALLERGIES  Patient has no known allergies.    PAST MEDICAL HISTORY  Active Ambulatory Problems     Diagnosis Date Noted   • Menorrhagia with regular cycle 11/11/2019   • Mixed dyslipidemia 02/07/2020   • Vitamin D deficiency 04/24/2020   • Essential hypertension 04/24/2020   • Hypothyroidism due to Hashimoto's thyroiditis 04/24/2020   • Symptomatic menopausal or female climacteric states 04/24/2020     Resolved Ambulatory Problems     Diagnosis Date Noted   • No Resolved Ambulatory Problems     Past Medical History:   Diagnosis Date   • Abnormal " menses    • Anemia    • Dizziness    • Fibroids    • GERD (gastroesophageal reflux disease)    • Hashimoto's disease    • Headache    • Hyperlipidemia    • Hypothyroidism        PAST SURGICAL HISTORY  Past Surgical History:   Procedure Laterality Date   • COLONOSCOPY N/A 1/31/2020    Procedure: COLONOSCOPY into cecum/terminal ileum;  Surgeon: Scooter Gross MD;  Location: [facility] ENDOSCOPY;  Service: Gastroenterology   • ELBOW PROCEDURE Left     AS CHILD   • ENDOSCOPY     • ENDOSCOPY N/A 1/31/2020    Procedure: ESOPHAGOGASTRODUODENOSCOPY with biopsy;  Surgeon: Scooter Gross MD;  Location: [facility] ENDOSCOPY;  Service: Gastroenterology   • LAPAROSCOPY WITH LASER      EXPLORATORY   • TOTAL LAPAROSCOPIC HYSTERECTOMY N/A 11/11/2019    Procedure: TOTAL LAPAROSCOPIC HYSTERECTOMY BILATERAL SALPINGECTOMY;  Surgeon: Amanda Santiago MD;  Location: [facility] MAIN OR;  Service: Gynecology       FAMILY HISTORY  Family History   Adopted: Yes   Problem Relation Age of Onset   • Malig Hyperthermia Neg Hx        SOCIAL HISTORY  Social History     Socioeconomic History   • Marital status: Single     Spouse name: Not on file   • Number of children: Not on file   • Years of education: Not on file   • Highest education level: Not on file   Tobacco Use   • Smoking status: Never Smoker   • Smokeless tobacco: Never Used   Substance and Sexual Activity   • Alcohol use: Yes     Comment: SOCIALLY   • Drug use: No   • Sexual activity: Defer       REVIEW OF SYSTEMS  Review of Systems    All systems reviewed and negative except for those discussed in HPI.     PHYSICAL EXAM    ED Triage Vitals   Temp Heart Rate Resp BP SpO2   05/06/20 0932 05/06/20 0931 05/06/20 0931 05/06/20 0956 05/06/20 0931   98 °F (36.7 °C) 118 16 (!) 219/110 99 %      Temp src Heart Rate Source Patient Position BP Location FiO2 (%)   05/06/20 0932 05/06/20 0943 -- 05/06/20 0956 --   Tympanic Monitor  Right arm      Physical Exam    I have reviewed the triage vital signs  and nursing notes.      GENERAL: not distressed, awake alert oriented x3; very anxious  HENT: nares patent  EYES: no scleral icterus; mild horizontal nystagmus  NECK: no ROM limitations  CV: regular rhythm, regular rate  RESPIRATORY: normal effort  MUSCULOSKELETAL: no deformity  NEURO: alert, moves all extremities, follows commands; minimal right-sided facial droop  SKIN: warm, dry    LAB RESULTS  Recent Results (from the past 24 hour(s))   Light Blue Top    Collection Time: 05/06/20  9:43 AM   Result Value Ref Range    Extra Tube hold for add-on    Green Top (Gel)    Collection Time: 05/06/20  9:43 AM   Result Value Ref Range    Extra Tube Hold for add-ons.    Lavender Top    Collection Time: 05/06/20  9:43 AM   Result Value Ref Range    Extra Tube hold for add-on    Gold Top - SST    Collection Time: 05/06/20  9:43 AM   Result Value Ref Range    Extra Tube Hold for add-ons.    Comprehensive Metabolic Panel    Collection Time: 05/06/20  9:43 AM   Result Value Ref Range    Glucose 123 (H) 65 - 99 mg/dL    BUN 10 6 - 20 mg/dL    Creatinine 0.69 0.57 - 1.00 mg/dL    Sodium 134 (L) 136 - 145 mmol/L    Potassium 3.5 3.5 - 5.2 mmol/L    Chloride 95 (L) 98 - 107 mmol/L    CO2 22.9 22.0 - 29.0 mmol/L    Calcium 9.7 8.6 - 10.5 mg/dL    Total Protein 7.8 6.0 - 8.5 g/dL    Albumin 4.80 3.50 - 5.20 g/dL    ALT (SGPT) 18 1 - 33 U/L    AST (SGOT) 13 1 - 32 U/L    Alkaline Phosphatase 77 39 - 117 U/L    Total Bilirubin 0.4 0.2 - 1.2 mg/dL    eGFR Non African Amer 90 >60 mL/min/1.73    Globulin 3.0 gm/dL    A/G Ratio 1.6 g/dL    BUN/Creatinine Ratio 14.5 7.0 - 25.0    Anion Gap 16.1 (H) 5.0 - 15.0 mmol/L   Protime-INR    Collection Time: 05/06/20  9:43 AM   Result Value Ref Range    Protime 11.7 11.7 - 14.2 Seconds    INR 0.88 (L) 0.90 - 1.10   aPTT    Collection Time: 05/06/20  9:43 AM   Result Value Ref Range    PTT 29.5 22.7 - 35.4 seconds   Troponin    Collection Time: 05/06/20  9:43 AM   Result Value Ref Range    Troponin T  <0.010 0.000 - 0.030 ng/mL   TSH    Collection Time: 05/06/20  9:43 AM   Result Value Ref Range    TSH 5.720 (H) 0.270 - 4.200 uIU/mL   CBC Auto Differential    Collection Time: 05/06/20  9:43 AM   Result Value Ref Range    WBC 7.67 3.40 - 10.80 10*3/mm3    RBC 4.42 3.77 - 5.28 10*6/mm3    Hemoglobin 14.7 12.0 - 15.9 g/dL    Hematocrit 42.1 34.0 - 46.6 %    MCV 95.2 79.0 - 97.0 fL    MCH 33.3 (H) 26.6 - 33.0 pg    MCHC 34.9 31.5 - 35.7 g/dL    RDW 12.5 12.3 - 15.4 %    RDW-SD 44.0 37.0 - 54.0 fl    MPV 10.5 6.0 - 12.0 fL    Platelets 294 140 - 450 10*3/mm3    Neutrophil % 56.9 42.7 - 76.0 %    Lymphocyte % 31.6 19.6 - 45.3 %    Monocyte % 9.1 5.0 - 12.0 %    Eosinophil % 1.6 0.3 - 6.2 %    Basophil % 0.5 0.0 - 1.5 %    Immature Grans % 0.3 0.0 - 0.5 %    Neutrophils, Absolute 4.37 1.70 - 7.00 10*3/mm3    Lymphocytes, Absolute 2.42 0.70 - 3.10 10*3/mm3    Monocytes, Absolute 0.70 0.10 - 0.90 10*3/mm3    Eosinophils, Absolute 0.12 0.00 - 0.40 10*3/mm3    Basophils, Absolute 0.04 0.00 - 0.20 10*3/mm3    Immature Grans, Absolute 0.02 0.00 - 0.05 10*3/mm3    nRBC 0.0 0.0 - 0.2 /100 WBC   Type & Screen    Collection Time: 05/06/20 10:37 AM   Result Value Ref Range    ABO Type O     RH type Positive     Antibody Screen Negative     T&S Expiration Date 5/9/2020 11:59:59 PM    Adult Transthoracic Echo Complete W/ Cont if Necessary Per Protocol    Collection Time: 05/06/20  2:21 PM   Result Value Ref Range    BSA 1.7 m^2    IVSd 1.0 cm    LVIDd 3.6 cm    LVIDs 2.4 cm    LVPWd 1.1 cm    IVS/LVPW 0.91     FS 33.3 %    EDV(Teich) 54.4 ml    ESV(Teich) 20.2 ml    EF(Teich) 63.0 %    EDV(cubed) 46.7 ml    ESV(cubed) 13.8 ml    EF(cubed) 70.4 %    LV mass(C)d 115.9 grams    LV mass(C)dI 66.3 grams/m^2    SV(Teich) 34.3 ml    SI(Teich) 19.6 ml/m^2    SV(cubed) 32.8 ml    SI(cubed) 18.8 ml/m^2    Ao root diam 2.6 cm    Ao root area 5.3 cm^2    ACS 1.8 cm    LA dimension 3.5 cm    LA/Ao 1.3     LVOT diam 2.0 cm    LVOT area 3.1 cm^2     LVOT area(traced) 3.1 cm^2    RVOT diam 1.7 cm    RVOT area 2.3 cm^2    LVLd ap4 7.5 cm    EDV(MOD-sp4) 94.0 ml    LVLs ap4 5.4 cm    ESV(MOD-sp4) 20.0 ml    EF(MOD-sp4) 78.7 %    LVLd ap2 7.6 cm    EDV(MOD-sp2) 88.0 ml    LVLs ap2 5.7 cm    ESV(MOD-sp2) 24.0 ml    EF(MOD-sp2) 72.7 %    SV(MOD-sp4) 74.0 ml    SI(MOD-sp4) 42.3 ml/m^2    SV(MOD-sp2) 64.0 ml    SI(MOD-sp2) 36.6 ml/m^2    Ao root area (BSA corrected) 1.5     LV Lozada Vol (BSA corrected) 53.8 ml/m^2    LV Sys Vol (BSA corrected) 11.4 ml/m^2    MV A dur 0.2 sec    MV E max fred 79.0 cm/sec    MV A max fred 114.0 cm/sec    MV E/A 0.69     MV V2 mean 60.8 cm/sec    MV mean PG 2.0 mmHg    MV V2 VTI 26.4 cm    MVA(VTI) 2.3 cm^2    MV P1/2t max fred 83.4 cm/sec    MV P1/2t 72.3 msec    MVA(P1/2t) 3.0 cm^2    MV dec slope 338.0 cm/sec^2    MV dec time 0.29 sec    Ao V2 mean 91.6 cm/sec    Ao mean PG 4.0 mmHg    Ao mean PG (full) 2.0 mmHg    Ao V2 VTI 26.1 cm    FANY(I,A) 2.4 cm^2    FANY(I,D) 2.4 cm^2    LV V1 mean PG 2.0 mmHg    LV V1 mean 64.9 cm/sec    LV V1 VTI 19.7 cm    SV(Ao) 138.6 ml    SI(Ao) 79.3 ml/m^2    SV(LVOT) 61.9 ml    SV(RVOT) 26.1 ml    SI(LVOT) 35.4 ml/m^2    PA V2 max 73.7 cm/sec    PA max PG 2.2 mmHg    PA max PG (full) 0.97 mmHg    BH CV ECHO JAVIER - PVA(V,A) 1.7 cm^2     CV ECHO JAVIER - PVA(V,D) 1.7 cm^2    PA acc slope 674.0 cm/sec^2    PA acc time 0.1 sec    RV V1 max PG 1.2 mmHg    RV V1 mean PG 1.0 mmHg    RV V1 max 54.9 cm/sec    RV V1 mean 39.5 cm/sec    RV V1 VTI 11.5 cm    TR max fred 206.0 cm/sec    RVSP(TR) 20.0 mmHg    RAP systole 3.0 mmHg    PA pr(Accel) 33.1 mmHg    Pulm Sys Fred 50.3 cm/sec    Pulm Lozada Fred 32.1 cm/sec    Pulm S/D 1.6     Qp/Qs 0.42     Pulm A Revs Dur 0.18 sec    Pulm A Revs Fred 26.7 cm/sec    MVA P1/2T LCG 2.6 cm^2     CV ECHO JAVIER - BZI_BMI 29.6 kilograms/m^2     CV ECHO JAVIER - BSA(SeymourCOCK) 1.8 m^2     CV ECHO JAVIER - BZI_METRIC_WEIGHT 73.5 kg     CV ECHO JAVIER - BZI_METRIC_HEIGHT 157.5 cm    Target HR  (85%) 145 bpm    Max. Pred. HR (100%) 170 bpm    TDI S' 16.10 cm/sec    RV Base 3.20 cm    RV Length 6.80 cm    RV Mid 2.40 cm    Dimensionless Index 0.8 (DI)    LA Volume Index 23.0 mL/m2    Avg E/e' ratio 9.69     Ao pk fred 127.0 cm/sec    EF(MOD-bp) 75 %    Lat Peak E' Fred 8.6 cm/sec    LV V1 max 89.0 cm/sec    Med Peak E' Fred 7.70 cm/sec    Ao max PG 7.0 mmHg    TAPSE (>1.6) 2.70 cm2         RADIOLOGY RESULTS  CT Head Without Contrast Stroke Protocol   Final Result   There is no convincing evidence to suggest completed focus   of acute infarction on this noncontrast head CT. Further evaluation   could be performed with an MRI of the brain for much more sensitive and   specific detection as clinically indicated.       These findings and recommendations were discussed with Macie Sexton PA-C, on 05/06/2020 at approximately 10:49 AM.       Radiation dose reduction techniques were utilized, including automated   exposure control and exposure modulation based on body size.       This report was finalized on 5/6/2020 1:13 PM by Dr. Srinivas Hurt M.D.          XR Chest 1 View   Final Result   FINDINGS AND IMPRESSION:   There is no pulmonary consolidation. No pneumothorax or pleural effusion   is seen. Heart is normal in size.       This report was finalized on 5/6/2020 10:27 AM by Dr. Ruben Meyer M.D.          MRI Brain Without Contrast    (Results Pending)   CT Angiogram Head    (Results Pending)   CT Angiogram Neck    (Results Pending)         PROGRESS, DATA ANALYSIS, CONSULTS AND MEDICAL DECISION MAKING  All labs have been independently reviewed by me.  All radiology studies have been reviewed by me and discussed with radiologist dictating the report. Discussion below represents my analysis of pertinent findings related to patient's condition, differential diagnosis, treatment plan and final disposition.     Patient is not a TPA candidate due to NIH scale of 3 and high blood pressure.         The  differential diagnosis include but are not limited to CVA, subarachnoid hemorrhage, hypertension urgency, TIA.    CBC, CMP, TSH, PTT, PT/INR, troponin, type and screen ordered, see chart/reviewed.    Labetalol ordered for patient's high blood pressure.    (IMAGING INTERPRETATION) chest x-ray was reviewed and interpreted myself which showed nothing acute.    (DISPOSITION RE-EXAM) Re-examined the patient prior to disposition.  Patient reports feeling better after labetalol.  Informed her imaging and lab results and admission to the hospital.  She agrees with plan of care and all questions addressed at this time.    10:00 AM I discussed with Dr. Yu, stroke neurologist that patient's condition and vital signs.  He does not think patient is a TPA candidate due to low NIH scale and due to high blood pressure.  Advised to give labetalol for hypertension and order CT head at this time.    Phone call with Lee.  Discussed the patient, relevant history, exam, diagnostics, ED findings/progress, and concerns. They agree to admit the patient to [place]. Care assumed by the admitting physician at this time.    Reviewed pt's history and workup with Dr. Royal.  After a bedside evaluation, Dr. Royal agrees with the plan of care.    (FOR DISCHARGE)The patient's history, physical exam, and lab findings were discussed with the physician, who also performed a face to face history and physical exam.  I discussed all results and noted any abnormalities with patient.  Discussed absoute need to recheck abnormalities with their family physician.  I answered any of the patient's questions.  Discussed plan for discharge, as there is no emergent indication for admission.  Pt is agreeable and understands need for follow up and repeat testing.  Pt is aware that discharge does not mean that nothing is wrong but it indicates no emergency is present and they must continue care with their family physician.  Pt is discharged with  "instructions to follow up with primary care doctor to have their blood pressure rechecked.           Disposition vitals:  /100   Pulse 73   Temp 98 °F (36.7 °C) (Oral)   Resp 16   Ht 157.5 cm (62\")   Wt 73.5 kg (162 lb)   LMP 10/28/2019   SpO2 97%   BMI 29.63 kg/m²       DIAGNOSIS  Final diagnoses:   Expressive aphasia   Hashimoto's disease   Near syncope       FOLLOW UP   No follow-up provider specified.       Macie Sexton PA-C  05/06/20 1530    "

## 2020-05-06 NOTE — THERAPY EVALUATION
Acute Care - Speech Language Pathology   Swallow Initial Evaluation [facility]     Patient Name: Macie Sarabia  : 1970  MRN: [id number]  Today's Date: 2020               Admit Date: 2020    Visit Dx:     ICD-10-CM ICD-9-CM   1. Expressive aphasia R47.01 784.3   2. Hashimoto's disease E06.3 245.2   3. Near syncope R55 780.2     Patient Active Problem List   Diagnosis   • Menorrhagia with regular cycle   • Mixed dyslipidemia   • Vitamin D deficiency   • Essential hypertension   • Hypothyroidism due to Hashimoto's thyroiditis   • Symptomatic menopausal or female climacteric states   • Expressive aphasia   • Near syncope   • Chronic fatigue     Past Medical History:   Diagnosis Date   • Abnormal menses    • Anemia    • Dizziness     AT TIMES   • Fibroids    • GERD (gastroesophageal reflux disease)    • Hashimoto's disease    • Headache     ? ALLERGIES   • Hyperlipidemia    • Hypothyroidism      Past Surgical History:   Procedure Laterality Date   • COLONOSCOPY N/A 2020    Procedure: COLONOSCOPY into cecum/terminal ileum;  Surgeon: Scooter Gross MD;  Location: [facility] ENDOSCOPY;  Service: Gastroenterology   • ELBOW PROCEDURE Left     AS CHILD   • ENDOSCOPY     • ENDOSCOPY N/A 2020    Procedure: ESOPHAGOGASTRODUODENOSCOPY with biopsy;  Surgeon: Scooter Gross MD;  Location: [facility] ENDOSCOPY;  Service: Gastroenterology   • LAPAROSCOPY WITH LASER      EXPLORATORY   • TOTAL LAPAROSCOPIC HYSTERECTOMY N/A 2019    Procedure: TOTAL LAPAROSCOPIC HYSTERECTOMY BILATERAL SALPINGECTOMY;  Surgeon: Amanda Santiago MD;  Location: [facility] MAIN OR;  Service: Gynecology        SWALLOW EVALUATION (last 72 hours)      SLP Adult Swallow Evaluation     Row Name 20 1530                   Rehab Evaluation    Document Type  evaluation  -OC        Subjective Information  no complaints  -OC        Patient Observations  alert;cooperative;agree to therapy  -OC        Patient Effort  good  -OC         "Symptoms Noted During/After Treatment  none  -OC           General Information    Patient Profile Reviewed  yes  -OC        Pertinent History Of Current Problem  Patient admitted for near syncope event. GERN, HTN.   -OC        Current Method of Nutrition  NPO  -OC        Precautions/Limitations, Vision  WFL  -OC        Precautions/Limitations, Hearing  WFL  -OC        Prior Level of Function-Communication  WFL  -OC        Prior Level of Function-Swallowing  no diet consistency restrictions  -OC        Plans/Goals Discussed with  patient  -OC        Barriers to Rehab  none identified  -OC        Patient's Goals for Discharge  patient did not state  -OC           Pain Assessment    Additional Documentation  Pain Scale: Numbers Pre/Post-Treatment (Group)  -OC           Pain Scale: Numbers Pre/Post-Treatment    Pain Scale: Numbers, Pretreatment  0/10 - no pain  -OC        Pain Scale: Numbers, Post-Treatment  0/10 - no pain  -OC           Oral Motor and Function    Dentition Assessment  natural, present and adequate  -OC        Secretion Management  WNL/WFL  -OC        Mucosal Quality  moist, healthy  -OC        Volitional Swallow  WFL  -OC        Volitional Cough  WFL  -OC           Oral Musculature and Cranial Nerve Assessment    Oral Motor General Assessment  WFL  -OC        Oral Motor, Comment  no asymmetry appreciated   -OC           General Eating/Swallowing Observations    Respiratory Support Currently in Use  room air  -OC           Clinical Swallow Eval    Clinical Swallow Evaluation Summary  Patient demonstrated no overt s/s aspiration with thin via cup and puree. Prolonged mastication with mechanical soft and regular. Patient reports \"effortful\" swallow and mastication. Intermittent throat clear at end of eval, patient report sore throat- vocal quality clear.   -OC           Clinical Impression    SLP Swallowing Diagnosis  functional oral phase;functional pharyngeal phase  -OC        Functional Impact  risk of " aspiration/pneumonia  -OC        Rehab Potential/Prognosis, Swallowing  good, to achieve stated therapy goals  -OC        Swallow Criteria for Skilled Therapeutic Interventions Met  demonstrates skilled criteria  -OC           Recommendations    Therapy Frequency (Swallow)  PRN  -OC        Predicted Duration Therapy Intervention (Days)  until discharge  -OC        SLP Diet Recommendation  soft textures;thin liquids;other (see comments) advance as tolerating  -OC        Recommended Diagnostics  reassess via clinical swallow evaluation  -OC        Recommended Precautions and Strategies  upright posture during/after eating;small bites of food and sips of liquid  -OC        SLP Rec. for Method of Medication Administration  meds whole;with thin liquids;with pudding or applesauce  -OC        Monitor for Signs of Aspiration  yes;notify SLP if any concerns  -OC        Anticipated Dischage Disposition  home  -OC           Swallow Goals (SLP)    Oral Nutrition/Hydration Goal Selection (SLP)  oral nutrition/hydration, SLP goal 1  -OC           Oral Nutrition/Hydration Goal 1 (SLP)    Oral Nutrition/Hydration Goal 1, SLP  Tolerate least restrictive diet with no overt s/s aspiration.   -OC        Time Frame (Oral Nutrition/Hydration Goal 1, SLP)  by discharge  -OC          User Key  (r) = Recorded By, (t) = Taken By, (c) = Cosigned By    Initials Name Effective Dates    OC Card, CORBIN Ortiz,[facility]-SLP 06/08/18 -           EDUCATION  The patient has been educated in the following areas:   Dysphagia (Swallowing Impairment).    SLP Recommendation and Plan  SLP Swallowing Diagnosis: functional oral phase, functional pharyngeal phase  SLP Diet Recommendation: soft textures, thin liquids, other (see comments)(advance as tolerating)  Recommended Precautions and Strategies: upright posture during/after eating, small bites of food and sips of liquid  SLP Rec. for Method of Medication Administration: meds whole, with thin liquids, with  "pudding or applesauce     Monitor for Signs of Aspiration: yes, notify SLP if any concerns  Recommended Diagnostics: reassess via clinical swallow evaluation  Swallow Criteria for Skilled Therapeutic Interventions Met: demonstrates skilled criteria  Anticipated Dischage Disposition: home  Rehab Potential/Prognosis, Swallowing: good, to achieve stated therapy goals  Therapy Frequency (Swallow): PRN  Predicted Duration Therapy Intervention (Days): until discharge       Plan of Care Reviewed With: patient  Outcome Summary: Bedside swallow eval completed. Recommend mechanical soft and thin, meds whole with thin or puree. May advance to regular as tolerating and patient's energy/endurance improves. Recommend upright and slow rate of intake. Patient report she follows an \"A and I\" diet, no soy, dairy, or gluten. ST to follow for need for speech eval pending work up.    SLP GOALS     Row Name 05/06/20 1530             Oral Nutrition/Hydration Goal 1 (SLP)    Oral Nutrition/Hydration Goal 1, SLP  Tolerate least restrictive diet with no overt s/s aspiration.   -OC      Time Frame (Oral Nutrition/Hydration Goal 1, SLP)  by discharge  -OC        User Key  (r) = Recorded By, (t) = Taken By, (c) = Cosigned By    Initials Name Provider Type    LUIS E Rojas, CORBIN Ortiz,CINDY-SLP Speech and Language Pathologist           SLP Outcome Measures (last 72 hours)      SLP Outcome Measures     Row Name 05/06/20 1639             SLP Outcome Measures    Outcome Measure Used?  Adult NOMS  -OC         Adult FCM Scores    FCM Chosen  Swallowing  -OC      Swallowing FCM Score  4  -OC        User Key  (r) = Recorded By, (t) = Taken By, (c) = Cosigned By    Initials Name Effective Dates    Diana Santos MA, CCC-SLP 06/08/18 -            Time Calculation:   Time Calculation- SLP     Row Name 05/06/20 1636             Time Calculation- SLP    SLP Start Time  1545  -OC      SLP Received On  05/06/20  -OC        User Key  (r) = Recorded By, (t) = Taken " By, (c) = Cosigned By    Initials Name Provider Type    OC Diana Rojas MA,CCC-SLP Speech and Language Pathologist          Therapy Charges for Today     Code Description Service Date Service Provider Modifiers Qty    [id number]  ST EVAL ORAL PHARYNG SWALLOW 3 5/6/2020 Diana Rojas MA,CCC-SLP GN 1               Diana Rojas MA,CINDY-SLP  5/6/2020

## 2020-05-06 NOTE — ED NOTES
Pt reports while at work, she felt like she was going to pass out and fell to the floor. Denies LOC. Reports recent dx of Hashimoto's and has rx of lisinopril although hasn't started it. Reports getting reading of bp at work of systolic 170's. A&O x4. Reports feeling like she is slow to get out her words. PRERNA Quijano at bedside for eval. Reassurance given; call light in reach. Pts breathing even and unlabored. Pt appears in NAD at this time.        Jesenia Angulo, RN  05/06/20 2674

## 2020-05-06 NOTE — ED NOTES
"Patient was wearing a face mask when I entered the room and they continued to wear a mask throughout their stay in the ED. I wore PPE including gloves, eye protection, and a surgical mask whenever I was in the room with patient. Once in room 5, pt proceeded to have \"anxiety attack\" and became restless in bed. Pt asked me several times \"what's happening\", and reports weakness. Reassurance provided, slow/steady breaths encouraged, RN at bedside.      Jesus Reyna  05/06/20 0941    "

## 2020-05-06 NOTE — H&P
"    Patient Name:  Macie Sarabia  YOB: 1970  MRN:  [id number]  Admit Date:  5/6/2020  Patient Care Team:  James Bob MD as PCP - General (Internal Medicine)  Amanda Santiago MD as Consulting Physician (Obstetrics and Gynecology)      Subjective   History Present Illness     Chief Complaint   Patient presents with   • Syncope       Ms. Sarabia is a 49 y.o. female with a history of Hashimoto's thyroiditis, GERD, HTN, HLD,  that presents to [facility] complaining of syncopal episode. She reports she attended a remote work meeting from home this morning around 0700. She was feeling fatigued and had a pressure-like feeling in occipital region. She became lightheaded and was having difficulty speaking. She did not lose consciousness. She could understand her son talking to her. She also reports having numbness to bar feet and hands and felt as though her body felt heavy. She was able to check her BP and reports it as elevated. She called her PCP who had pt repeat her BP and advised her to come to ED. She picked up a prescription for antihypertensive yesterday but has not started yet. She is followed by Endocrinology for hypothyroidism and reports medication was adjusted ~ 1 month ago. She also has a sleep study scheduled in the near future. She has also experienced a feeling of her heart \"quivering\" but denies significant cardiac pain or dyspnea.     BP elevated in ED. Afebrile. Trop negative. Glucose 123 on chem panel; Na 134, Cl 95. TSH 5.720. CBC unremarkable. CXR without acute findings. CTH without evidence of acute infarction. Neurology was contacted. Not candidate for TPA due to low NIH scale and elevated BP.       Review of Systems   Constitutional: Positive for fatigue.   Eyes: Negative.    Respiratory: Negative.    Cardiovascular: Positive for palpitations.   Gastrointestinal: Positive for nausea.   Genitourinary: Negative.    Musculoskeletal: Negative.    Skin: " Negative.    Neurological: Positive for speech difficulty, weakness, light-headedness, numbness and headaches.   Psychiatric/Behavioral: The patient is nervous/anxious.         Personal History     Past Medical History:   Diagnosis Date   • Abnormal menses    • Anemia    • Dizziness     AT TIMES   • Fibroids    • GERD (gastroesophageal reflux disease)    • Hashimoto's disease    • Headache     ? ALLERGIES   • Hyperlipidemia    • Hypothyroidism      Past Surgical History:   Procedure Laterality Date   • COLONOSCOPY N/A 1/31/2020    Procedure: COLONOSCOPY into cecum/terminal ileum;  Surgeon: Scooter Gross MD;  Location: [facility] ENDOSCOPY;  Service: Gastroenterology   • ELBOW PROCEDURE Left     AS CHILD   • ENDOSCOPY     • ENDOSCOPY N/A 1/31/2020    Procedure: ESOPHAGOGASTRODUODENOSCOPY with biopsy;  Surgeon: Scooter Gross MD;  Location: [facility] ENDOSCOPY;  Service: Gastroenterology   • LAPAROSCOPY WITH LASER      EXPLORATORY   • TOTAL LAPAROSCOPIC HYSTERECTOMY N/A 11/11/2019    Procedure: TOTAL LAPAROSCOPIC HYSTERECTOMY BILATERAL SALPINGECTOMY;  Surgeon: Amanda Santiago MD;  Location: [facility] MAIN OR;  Service: Gynecology     Family History   Adopted: Yes   Problem Relation Age of Onset   • Malig Hyperthermia Neg Hx      Social History     Tobacco Use   • Smoking status: Never Smoker   • Smokeless tobacco: Never Used   Substance Use Topics   • Alcohol use: Yes     Comment: SOCIALLY   • Drug use: No     No current facility-administered medications on file prior to encounter.      Current Outpatient Medications on File Prior to Encounter   Medication Sig Dispense Refill   • acetaminophen (Tylenol 8 Hour Arthritis Pain) 650 MG 8 hr tablet      • lisinopril (PRINIVIL,ZESTRIL) 10 MG tablet TAKE 1 TABLET BY MOUTH DAILY 30 tablet 2   • omeprazole (priLOSEC) 40 MG capsule TK 1 C PO QD     • ondansetron (ZOFRAN) 4 MG tablet Take 1 tablet by mouth Every 8 (Eight) Hours As Needed for Nausea. 12 tablet 0   • pantoprazole  (PROTONIX) 40 MG EC tablet Take 1 tablet by mouth Daily. 30 tablet 3   • rosuvastatin (Crestor) 20 MG tablet Take 1 tablet by mouth Every Night. 90 tablet 3   • Selenium 100 MCG capsule      • TIROSINT 75 MCG capsule Take 1 capsule by mouth Daily. 30 capsule 11   • [DISCONTINUED] levothyroxine (SYNTHROID, LEVOTHROID) 50 MCG tablet Take  by mouth Daily.       No Known Allergies    Objective    Objective     Vital Signs  Temp:  [98 °F (36.7 °C)-98.5 °F (36.9 °C)] 98 °F (36.7 °C)  Heart Rate:  [] 73  Resp:  [15-16] 16  BP: (159-219)/(100-117) 159/100  SpO2:  [97 %-99 %] 97 %  on   ;   Device (Oxygen Therapy): room air  Body mass index is 29.67 kg/m².    Physical Exam   Constitutional: She is oriented to person, place, and time. No distress.   HENT:   Head: Normocephalic.   Eyes: EOM are normal.   Neck: No JVD present.   Cardiovascular: Normal rate, regular rhythm and normal heart sounds.   Pulmonary/Chest: Effort normal and breath sounds normal. No respiratory distress.   Abdominal: Soft. Bowel sounds are normal.   Musculoskeletal: She exhibits no edema.   Weak but equal all extremities  Mild tenderness to lt chest wall   Neurological: She is alert and oriented to person, place, and time. Coordination normal.   Face appears symmetrical  Speech is slow but appropriate   Skin: Skin is warm and dry.   Psychiatric: Her mood appears anxious. Cognition and memory are normal.   Vitals reviewed.      Results Review:  I reviewed the patient's new clinical results.  I reviewed the patient's new imaging results and agree with the interpretation.  I reviewed the patient's other test results and agree with the interpretation  I personally viewed and interpreted the patient's EKG/Telemetry data  Discussed with ED provider.    Lab Results (last 24 hours)     Procedure Component Value Units Date/Time    CBC & Differential [[id number]] Collected:  05/06/20 0943    Specimen:  Blood Updated:  05/06/20 1008    Narrative:       The  following orders were created for panel order CBC & Differential.  Procedure                               Abnormality         Status                     ---------                               -----------         ------                     CBC Auto Differential[[id number]]        Abnormal            Final result                 Please view results for these tests on the individual orders.    Comprehensive Metabolic Panel [[id number]]  (Abnormal) Collected:  05/06/20 0943    Specimen:  Blood Updated:  05/06/20 1041     Glucose 123 mg/dL      BUN 10 mg/dL      Creatinine 0.69 mg/dL      Sodium 134 mmol/L      Potassium 3.5 mmol/L      Chloride 95 mmol/L      CO2 22.9 mmol/L      Calcium 9.7 mg/dL      Total Protein 7.8 g/dL      Albumin 4.80 g/dL      ALT (SGPT) 18 U/L      AST (SGOT) 13 U/L      Alkaline Phosphatase 77 U/L      Total Bilirubin 0.4 mg/dL      eGFR Non African Amer 90 mL/min/1.73      Globulin 3.0 gm/dL      A/G Ratio 1.6 g/dL      BUN/Creatinine Ratio 14.5     Anion Gap 16.1 mmol/L     Narrative:       GFR Normal >60  Chronic Kidney Disease <60  Kidney Failure <15      Protime-INR [[id number]]  (Abnormal) Collected:  05/06/20 0943    Specimen:  Blood Updated:  05/06/20 1038     Protime 11.7 Seconds      INR 0.88    aPTT [[id number]]  (Normal) Collected:  05/06/20 0943    Specimen:  Blood Updated:  05/06/20 1038     PTT 29.5 seconds     Troponin [[id number]]  (Normal) Collected:  05/06/20 0943    Specimen:  Blood Updated:  05/06/20 1046     Troponin T <0.010 ng/mL     Narrative:       Troponin T Reference Range:  <= 0.03 ng/mL-   Negative for AMI  >0.03 ng/mL-     Abnormal for myocardial necrosis.  Clinicians would have to utilize clinical acumen, EKG, Troponin and serial changes to determine if it is an Acute Myocardial Infarction or myocardial injury due to an underlying chronic condition.       Results may be falsely decreased if patient taking Biotin.      TSH [[id number]]  (Abnormal) Collected:   05/06/20 0943    Specimen:  Blood Updated:  05/06/20 1046     TSH 5.720 uIU/mL      Comment: TSH results may be falsely decreased if patient taking Biotin.       CBC Auto Differential [[id number]]  (Abnormal) Collected:  05/06/20 0943    Specimen:  Blood Updated:  05/06/20 1008     WBC 7.67 10*3/mm3      RBC 4.42 10*6/mm3      Hemoglobin 14.7 g/dL      Hematocrit 42.1 %      MCV 95.2 fL      MCH 33.3 pg      MCHC 34.9 g/dL      RDW 12.5 %      RDW-SD 44.0 fl      MPV 10.5 fL      Platelets 294 10*3/mm3      Neutrophil % 56.9 %      Lymphocyte % 31.6 %      Monocyte % 9.1 %      Eosinophil % 1.6 %      Basophil % 0.5 %      Immature Grans % 0.3 %      Neutrophils, Absolute 4.37 10*3/mm3      Lymphocytes, Absolute 2.42 10*3/mm3      Monocytes, Absolute 0.70 10*3/mm3      Eosinophils, Absolute 0.12 10*3/mm3      Basophils, Absolute 0.04 10*3/mm3      Immature Grans, Absolute 0.02 10*3/mm3      nRBC 0.0 /100 WBC           Imaging Results (Last 24 Hours)     Procedure Component Value Units Date/Time    CT Head Without Contrast Stroke Protocol [[id number]] Collected:  05/06/20 1152     Updated:  05/06/20 1316    Narrative:       CT HEAD WITHOUT CONTRAST     CLINICAL HISTORY: Expressive aphasia, right-sided facial droop, and  weakness.     TECHNIQUE: CT scan of the head was obtained with 3 mm axial images. No  intravenous contrast was administered.     FINDINGS: The ventricles, sulci, and cisterns are age appropriate. The  basal ganglia and thalami are unremarkable in appearance. The posterior  fossa structures are within normal limits.     Incidental note is made of a mucous retention cyst within the sphenoid  sinus.       Impression:       There is no convincing evidence to suggest completed focus  of acute infarction on this noncontrast head CT. Further evaluation  could be performed with an MRI of the brain for much more sensitive and  specific detection as clinically indicated.     These findings and recommendations  were discussed with Macie Sexton PA-C, on 05/06/2020 at approximately 10:49 AM.     Radiation dose reduction techniques were utilized, including automated  exposure control and exposure modulation based on body size.     This report was finalized on 5/6/2020 1:13 PM by Dr. Srinivas Hurt M.D.       XR Chest 1 View [[id number]] Collected:  05/06/20 1024     Updated:  05/06/20 1030    Narrative:       Portable chest radiograph     HISTORY:Stroke     TECHNIQUE: Single AP portable radiograph of the chest     COMPARISON:None       Impression:       FINDINGS AND IMPRESSION:  There is no pulmonary consolidation. No pneumothorax or pleural effusion  is seen. Heart is normal in size.     This report was finalized on 5/6/2020 10:27 AM by Dr. Ruben Meyer M.D.                  ECG 12 Lead   Preliminary Result   HEART RATE= 86  bpm   RR Interval= 696  ms   MN Interval= 156  ms   P Horizontal Axis= -9  deg   P Front Axis= 67  deg   QRSD Interval= 87  ms   QT Interval= 387  ms   QRS Axis= 31  deg   T Wave Axis= 9  deg   - NORMAL ECG -   Sinus rhythm   Electronically Signed By:    Date and Time of Study: 2020-05-06 10:17:56           Assessment/Plan     Active Hospital Problems    Diagnosis  POA   • **Near syncope [R55]  Yes   • Expressive aphasia [R47.01]  Yes   • Chronic fatigue [R53.82]  Yes   • Essential hypertension [I10]  Yes   • Hypothyroidism due to Hashimoto's thyroiditis [E03.8, E06.3]  Yes   • Mixed dyslipidemia [E78.2]  Yes      Resolved Hospital Problems   No resolved problems to display.       Ms. Sarabia is a 49 y.o. female with a history of  Hashimoto's thyroiditis, GERD, HTN, HLD who is admitted with near syncopal episode, expressive aphasia    Syncope/Expressive aphasia:  -CVA/TIA workup  -Neurology consult  -NPO  -OT/PT/ST eval  -Lipid panel, A1C  -ASA, Statin    HTN:  -Allow permissive HTN for now  -Prescribed lisinopril but had not yet started  -Monitor    Hashimoto's thyroiditis/Hypothyroidism:  -TSH  5.720  -Pt reports medication dose increase ~ 1 month ago (per chart, increased to 75 mcg); followed by Dr. Morgan    Fatigue:  -Has future sleep study scheduled  -Endocrinology also evaluating    HLD:  -Statin (home med rosuvastatin recently increased to 20 mg hs)    Home medications to be reconciled when available    · I discussed the patient's findings and my recommendations with patient and Dr. Weiss.    VTE Prophylaxis - SCDs.  Code Status - Full code.       ROBERT Snider  [facility]  05/06/20  13:33

## 2020-05-06 NOTE — PLAN OF CARE
Problem: Patient Care Overview  Goal: Plan of Care Review  Outcome: Ongoing (interventions implemented as appropriate)  Flowsheets (Taken 5/6/2020 1354)  Progress: no change  Plan of Care Reviewed With: patient  Note:   Patient alert and oriented x 4.  Admitted to the floor for near syncope.  NIH = 1.  VSS.  Will continue to monitor.     Problem: Fall Risk (Adult)  Goal: Absence of Fall  Outcome: Ongoing (interventions implemented as appropriate)  Flowsheets (Taken 5/6/2020 9887)  Absence of Fall: making progress toward outcome

## 2020-05-07 ENCOUNTER — APPOINTMENT (OUTPATIENT)
Dept: MRI IMAGING | Facility: HOSPITAL | Age: 50
End: 2020-05-07

## 2020-05-07 ENCOUNTER — READMISSION MANAGEMENT (OUTPATIENT)
Dept: CALL CENTER | Facility: HOSPITAL | Age: 50
End: 2020-05-07

## 2020-05-07 ENCOUNTER — APPOINTMENT (OUTPATIENT)
Dept: CARDIOLOGY | Facility: HOSPITAL | Age: 50
End: 2020-05-07

## 2020-05-07 VITALS
OXYGEN SATURATION: 100 % | RESPIRATION RATE: 18 BRPM | TEMPERATURE: 98 F | HEART RATE: 82 BPM | HEIGHT: 62 IN | WEIGHT: 162 LBS | BODY MASS INDEX: 29.81 KG/M2 | DIASTOLIC BLOOD PRESSURE: 97 MMHG | SYSTOLIC BLOOD PRESSURE: 150 MMHG

## 2020-05-07 LAB
ALBUMIN SERPL-MCNC: 3.7 G/DL (ref 3.5–5.2)
ALBUMIN/GLOB SERPL: 1.9 G/DL
ALP SERPL-CCNC: 51 U/L (ref 39–117)
ALT SERPL W P-5'-P-CCNC: 13 U/L (ref 1–33)
ANION GAP SERPL CALCULATED.3IONS-SCNC: 11 MMOL/L (ref 5–15)
AST SERPL-CCNC: 11 U/L (ref 1–32)
BILIRUB SERPL-MCNC: 0.6 MG/DL (ref 0.2–1.2)
BUN BLD-MCNC: 8 MG/DL (ref 6–20)
BUN/CREAT SERPL: 11.9 (ref 7–25)
CALCIUM SPEC-SCNC: 8.6 MG/DL (ref 8.6–10.5)
CHLORIDE SERPL-SCNC: 102 MMOL/L (ref 98–107)
CHOLEST SERPL-MCNC: 193 MG/DL (ref 0–200)
CO2 SERPL-SCNC: 23 MMOL/L (ref 22–29)
CREAT BLD-MCNC: 0.67 MG/DL (ref 0.57–1)
DEPRECATED RDW RBC AUTO: 47.1 FL (ref 37–54)
ERYTHROCYTE [DISTWIDTH] IN BLOOD BY AUTOMATED COUNT: 13 % (ref 12.3–15.4)
GFR SERPL CREATININE-BSD FRML MDRD: 93 ML/MIN/1.73
GLOBULIN UR ELPH-MCNC: 2 GM/DL
GLUCOSE BLD-MCNC: 104 MG/DL (ref 65–99)
GLUCOSE BLDC GLUCOMTR-MCNC: 107 MG/DL (ref 70–130)
HBA1C MFR BLD: 4.95 % (ref 4.8–5.6)
HCT VFR BLD AUTO: 37.8 % (ref 34–46.6)
HDLC SERPL-MCNC: 52 MG/DL (ref 40–60)
HGB BLD-MCNC: 12.7 G/DL (ref 12–15.9)
LDLC SERPL CALC-MCNC: 96 MG/DL (ref 0–100)
LDLC/HDLC SERPL: 1.85 {RATIO}
MCH RBC QN AUTO: 33 PG (ref 26.6–33)
MCHC RBC AUTO-ENTMCNC: 33.6 G/DL (ref 31.5–35.7)
MCV RBC AUTO: 98.2 FL (ref 79–97)
PLATELET # BLD AUTO: 232 10*3/MM3 (ref 140–450)
PMV BLD AUTO: 10.4 FL (ref 6–12)
POTASSIUM BLD-SCNC: 3.5 MMOL/L (ref 3.5–5.2)
PROT SERPL-MCNC: 5.7 G/DL (ref 6–8.5)
RBC # BLD AUTO: 3.85 10*6/MM3 (ref 3.77–5.28)
SODIUM BLD-SCNC: 136 MMOL/L (ref 136–145)
TRIGL SERPL-MCNC: 223 MG/DL (ref 0–150)
VLDLC SERPL-MCNC: 44.6 MG/DL (ref 5–40)
WBC NRBC COR # BLD: 5.47 10*3/MM3 (ref 3.4–10.8)

## 2020-05-07 PROCEDURE — 99243 OFF/OP CNSLTJ NEW/EST LOW 30: CPT | Performed by: PSYCHIATRY & NEUROLOGY

## 2020-05-07 PROCEDURE — 96376 TX/PRO/DX INJ SAME DRUG ADON: CPT

## 2020-05-07 PROCEDURE — 82962 GLUCOSE BLOOD TEST: CPT

## 2020-05-07 PROCEDURE — G0378 HOSPITAL OBSERVATION PER HR: HCPCS

## 2020-05-07 PROCEDURE — 80053 COMPREHEN METABOLIC PANEL: CPT | Performed by: NURSE PRACTITIONER

## 2020-05-07 PROCEDURE — 80061 LIPID PANEL: CPT | Performed by: NURSE PRACTITIONER

## 2020-05-07 PROCEDURE — 85027 COMPLETE CBC AUTOMATED: CPT | Performed by: NURSE PRACTITIONER

## 2020-05-07 PROCEDURE — 25010000002 ONDANSETRON PER 1 MG: Performed by: NURSE PRACTITIONER

## 2020-05-07 PROCEDURE — 83036 HEMOGLOBIN GLYCOSYLATED A1C: CPT | Performed by: NURSE PRACTITIONER

## 2020-05-07 PROCEDURE — 0296T HC EXT ECG > 48HR TO 21 DAY RCRD W/CONECT INTL RCRD: CPT

## 2020-05-07 PROCEDURE — 70551 MRI BRAIN STEM W/O DYE: CPT

## 2020-05-07 PROCEDURE — 36415 COLL VENOUS BLD VENIPUNCTURE: CPT | Performed by: NURSE PRACTITIONER

## 2020-05-07 PROCEDURE — 96361 HYDRATE IV INFUSION ADD-ON: CPT

## 2020-05-07 RX ORDER — ASPIRIN 81 MG/1
81 TABLET ORAL DAILY
Status: DISCONTINUED | OUTPATIENT
Start: 2020-05-07 | End: 2020-05-07 | Stop reason: HOSPADM

## 2020-05-07 RX ORDER — ATORVASTATIN CALCIUM 80 MG/1
80 TABLET, FILM COATED ORAL NIGHTLY
Qty: 30 TABLET | Refills: 0 | Status: SHIPPED | OUTPATIENT
Start: 2020-05-07 | End: 2021-01-05 | Stop reason: CLARIF

## 2020-05-07 RX ORDER — ASPIRIN 81 MG/1
81 TABLET ORAL DAILY
Qty: 30 TABLET | Refills: 0 | Status: SHIPPED | OUTPATIENT
Start: 2020-05-07 | End: 2021-05-20

## 2020-05-07 RX ADMIN — ASPIRIN 325 MG: 325 TABLET ORAL at 10:03

## 2020-05-07 RX ADMIN — ONDANSETRON 4 MG: 2 INJECTION INTRAMUSCULAR; INTRAVENOUS at 05:11

## 2020-05-07 RX ADMIN — SODIUM CHLORIDE 75 ML/HR: 9 INJECTION, SOLUTION INTRAVENOUS at 05:10

## 2020-05-07 NOTE — PLAN OF CARE
Reports nausea twice during this shift, Zofran given as ordered. NIH score is a 2 because of slurred speech and a facial droop. MRI planned for this AM. Pt also c/o numbness and tingling in feet and hands. Tylenol given for neck pain last night, normal sinus on the monitor.

## 2020-05-07 NOTE — SIGNIFICANT NOTE
05/07/20 1230   Rehab Treatment   Discipline speech language pathologist   Reason Treatment Not Performed other (see comments)  (MD refused tx session. Patient stated she is no longer having difficulty with eating/swallowing. Will sign off.)

## 2020-05-07 NOTE — DISCHARGE SUMMARY
Patient Name: Macie Sarabia  : 1970  MRN: [id number]    Date of Admission: 2020  Date of Discharge:  2020  Primary Care Physician: James Bob MD      Chief Complaint:   Syncope      Discharge Diagnoses     Active Hospital Problems    Diagnosis  POA   • **Near syncope [R55]  Yes   • Expressive aphasia [R47.01]  Yes   • Chronic fatigue [R53.82]  Yes   • Essential hypertension [I10]  Yes   • Hypothyroidism due to Hashimoto's thyroiditis [E03.8, E06.3]  Yes   • Mixed dyslipidemia [E78.2]  Yes      Resolved Hospital Problems   No resolved problems to display.        Hospital Course     Ms. Sarabia is a 49 y.o. female with a history of Hashimoto's thyroiditis, GERD, HTN, HLD who presented to [facility] initially complaining of syncopal episode described as episode of feeling lightheaded and having difficulty speaking. She also reported associated occipital headache described as pressure and heavy feeling in arms and legs with numbness in hands and feet.  Please see the admitting history and physical for further details.  She was admitted to the hospital for further evaluation and treatment of potential TIA vs CVA. BP was elevated on admission. Symptoms have nearly completely resolved. She continues to c/o mild occipital HA today. Speech has returned to normal. CTH was negative. Additional imaging included MRI of brain reviewed by Neurology shows minimal chroniic microvascular changes.  CTA head and neck were unremarkable. Neurology relates symptoms to HTN urgency, which has resolved,  as TIA vs atypical migraine. Recommended to continue ASA 81 mg and statin at discharge. She will have Zio patch placed. Goal BP < 130/80. F/U Neurology in 3 months. As mentioned in H&P, pt is followed by Endocrinology for hypothyroidism and has future appointment for sleep study. Medically, she is stable for discharge.           Day of Discharge     No additional episodes. Has been up; no  lightheadedness, gait is steady. Continues with mild occipital HA on rt. Speech has returned to normal. Agrees with discharge home    Physical Exam:  Temp:  [98 °F (36.7 °C)] 98 °F (36.7 °C)  Heart Rate:  [82] 82  Resp:  [17-18] 18  BP: (137-150)/(90-97) 150/97  Body mass index is 29.62 kg/m².  Physical Exam   Constitutional: She is oriented to person, place, and time. No distress.   HENT:   Head: Normocephalic.   Eyes: EOM are normal.   Neck: No JVD present.   Cardiovascular: Normal rate and regular rhythm.   Pulmonary/Chest: Effort normal and breath sounds normal. No respiratory distress.   Abdominal: Soft. Bowel sounds are normal.   Musculoskeletal: She exhibits no edema.   Neurological: She is alert and oriented to person, place, and time. No cranial nerve deficit. Coordination normal.   Skin: Skin is warm and dry.   Psychiatric: She has a normal mood and affect.   Vitals reviewed.      Consultants     Consult Orders (all) (From admission, onward)     Start     Ordered    05/07/20 0000  Inpatient consult to Neuro Clinical Specialist  Once     Provider:  Carmelo Bronson MD    05/06/20 1255    05/06/20 1256  Inpatient Neurology Consult Stroke  Once     Specialty:  Neurology  Provider:  Bart Sears MD    05/06/20 1255    05/06/20 1256  Inpatient Rehab Admission Consult  Once     Provider:  (Not yet assigned)    05/06/20 1255    05/06/20 1256  Consult to Case Management   Once     Provider:  (Not yet assigned)    05/06/20 1255    05/06/20 1256  Consult to Diabetes Educator  Once,   Status:  Canceled     Provider:  (Not yet assigned)    05/06/20 1255    05/06/20 1139  [facility] (on-call MD unless specified) Details  Once     Specialty:  Hospitalist  Provider:  (Not yet assigned)    05/06/20 1138    05/06/20 1003  Inpatient Neurology Consult Stroke  Once     Specialty:  Neurology  Provider:  (Not yet assigned)    05/06/20 1003              Procedures     Imaging Results (All)      Procedure Component Value Units Date/Time    MRI Brain Without Contrast [[id number]] Collected:  05/07/20 0849     Updated:  05/07/20 1120    Narrative:       MRI BRAIN WITHOUT CONTRAST 05/07/2020     CLINICAL HISTORY: Dizziness, right facial droop, headache, evaluate for  stroke.     TECHNIQUE: Axial T1, FLAIR, fat-suppressed T2, axial diffusion and  gradient echo T2 and sagittal T1-weighted images were obtained of the  entire head.     COMPARISON: This is correlated to a CT angiogram of the head and neck  yesterday, 05/06/2020, at 5:37 PM.     FINDINGS: There are scattered tiny nodular foci of T2 high signal in the  cerebral white matter that are either idiopathic or related to very  minimal small vessel disease. The remainder of the brain parenchyma is  normal in signal intensity. Specifically, no diffusion-weighted  abnormality is seen with no acute infarct identified. On the gradient  echo T2-weighted images, no acute or old blood breakdown products are  seen intracranially. The ventricles are normal in size. I see no mass  effect and no midline shift, and no extraaxial fluid sections are  identified. The paranasal sinuses, the mastoid air cells and the middle  ear cavities are clear. Good flow voids are demonstrated in the cerebral  vessels and in the dural venous sinuses.       Impression:       1. There are scattered tiny patchy nodular foci of T2 high signal in the  cerebral white matter that are either idiopathic or related to minimal  small vessel disease, and the remainder of the MRI of the brain is  within normal limits. Specifically, no acute infarct is identified and  the etiology of the dizziness, right facial droop and headache is not  established on this exam.     This report was finalized on 5/7/2020 11:17 AM by Dr. James Walls M.D.       CT Angiogram Head [[id number]] Collected:  05/06/20 2052     Updated:  05/06/20 2107    Narrative:       CT ANGIOGRAM HEAD-, CT ANGIOGRAM NECK-     INDICATIONS:  Stroke     TECHNIQUE: Radiation dose reduction techniques were utilized, including  automated exposure control and exposure modulation based on body  size.Noncontrast head CT was performed, followed by enhanced CT  angiography of the head and neck. Three-dimensional reconstructions were  created, reviewed, and assessed according to NASCET criteria.     COMPARISON: Noncontrast head CT from 05/06/2020     FINDINGS:     No acute intracranial hemorrhage, midline shift or mass effect. 5 mm  pleural-based calcification posteriorly at the right middle cranial  fossa, potentially calcified meningioma. No acute territorial infarct is  identified. Basal ganglia mineralization is present.     No enhancing lesions of brain are noted following contrast material  administration.     Ventricles, cisterns, cerebral sulci are unremarkable for patient age.     Likely mucous retention cyst or polyp in the left sphenoid sinus. The  visualized paranasal sinuses, orbits, mastoid air cells are otherwise  unremarkable. Multiple calcifications are seen in the right parotid  gland. Degenerative change is seen at the temporomandibular joints.     The CT angiography images show no hemodynamically significant focal  stenosis, aneurysm, or dissection in the cervical carotid or vertebral  arteries, or in the arteries at the base of the brain. Left A1 segment  is dominant. Anterior and bilateral posterior communicating arteries are  patent.        Partly included lung apices show no focal pulmonary consolidation.     Facet and uncovertebral joint hypertrophy contribute to bilateral neural  foraminal narrowing, more prominent on the right at C3/4, C5/6, and on  the left at C4/5. Disc osteophyte complex appears to result in mild  central stenosis at C2/3, C3/4, moderate central stenosis at C4/5, C5/6.  If there is further clinical concern, MRI of the cervical spine could be  considered for further evaluation.          Impression:       1. No  hemodynamically significant focal stenosis, aneurysm, or  dissection in the cervical carotid or vertebral arteries, or in the  arteries at the base of the brain.     2. No acute intracranial hemorrhage or hydrocephalus. No enhancing  lesion in brain. If there is further clinical concern, MRI could be  considered for further evaluation.     This report was finalized on 5/6/2020 9:03 PM by Dr. Pardeep Guzman M.D.       CT Angiogram Neck [[id number]] Collected:  05/06/20 2052     Updated:  05/06/20 2107    Narrative:       CT ANGIOGRAM HEAD-, CT ANGIOGRAM NECK-     INDICATIONS: Stroke     TECHNIQUE: Radiation dose reduction techniques were utilized, including  automated exposure control and exposure modulation based on body  size.Noncontrast head CT was performed, followed by enhanced CT  angiography of the head and neck. Three-dimensional reconstructions were  created, reviewed, and assessed according to NASCET criteria.     COMPARISON: Noncontrast head CT from 05/06/2020     FINDINGS:     No acute intracranial hemorrhage, midline shift or mass effect. 5 mm  pleural-based calcification posteriorly at the right middle cranial  fossa, potentially calcified meningioma. No acute territorial infarct is  identified. Basal ganglia mineralization is present.     No enhancing lesions of brain are noted following contrast material  administration.     Ventricles, cisterns, cerebral sulci are unremarkable for patient age.     Likely mucous retention cyst or polyp in the left sphenoid sinus. The  visualized paranasal sinuses, orbits, mastoid air cells are otherwise  unremarkable. Multiple calcifications are seen in the right parotid  gland. Degenerative change is seen at the temporomandibular joints.     The CT angiography images show no hemodynamically significant focal  stenosis, aneurysm, or dissection in the cervical carotid or vertebral  arteries, or in the arteries at the base of the brain. Left A1 segment  is dominant.  Anterior and bilateral posterior communicating arteries are  patent.        Partly included lung apices show no focal pulmonary consolidation.     Facet and uncovertebral joint hypertrophy contribute to bilateral neural  foraminal narrowing, more prominent on the right at C3/4, C5/6, and on  the left at C4/5. Disc osteophyte complex appears to result in mild  central stenosis at C2/3, C3/4, moderate central stenosis at C4/5, C5/6.  If there is further clinical concern, MRI of the cervical spine could be  considered for further evaluation.          Impression:       1. No hemodynamically significant focal stenosis, aneurysm, or  dissection in the cervical carotid or vertebral arteries, or in the  arteries at the base of the brain.     2. No acute intracranial hemorrhage or hydrocephalus. No enhancing  lesion in brain. If there is further clinical concern, MRI could be  considered for further evaluation.     This report was finalized on 5/6/2020 9:03 PM by Dr. Pardeep Guzman M.D.       CT Head Without Contrast Stroke Protocol [[id number]] Collected:  05/06/20 1152     Updated:  05/06/20 1316    Narrative:       CT HEAD WITHOUT CONTRAST     CLINICAL HISTORY: Expressive aphasia, right-sided facial droop, and  weakness.     TECHNIQUE: CT scan of the head was obtained with 3 mm axial images. No  intravenous contrast was administered.     FINDINGS: The ventricles, sulci, and cisterns are age appropriate. The  basal ganglia and thalami are unremarkable in appearance. The posterior  fossa structures are within normal limits.     Incidental note is made of a mucous retention cyst within the sphenoid  sinus.       Impression:       There is no convincing evidence to suggest completed focus  of acute infarction on this noncontrast head CT. Further evaluation  could be performed with an MRI of the brain for much more sensitive and  specific detection as clinically indicated.     These findings and recommendations were  discussed with Macie Sexton PA-C, on 05/06/2020 at approximately 10:49 AM.     Radiation dose reduction techniques were utilized, including automated  exposure control and exposure modulation based on body size.     This report was finalized on 5/6/2020 1:13 PM by Dr. Srinivas Hurt M.D.       XR Chest 1 View [[id number]] Collected:  05/06/20 1024     Updated:  05/06/20 1030    Narrative:       Portable chest radiograph     HISTORY:Stroke     TECHNIQUE: Single AP portable radiograph of the chest     COMPARISON:None       Impression:       FINDINGS AND IMPRESSION:  There is no pulmonary consolidation. No pneumothorax or pleural effusion  is seen. Heart is normal in size.     This report was finalized on 5/6/2020 10:27 AM by Dr. Ruben Meyer M.D.             Pertinent Labs     Results from last 7 days   Lab Units 05/07/20  0410 05/06/20  0943   WBC 10*3/mm3 5.47 7.67   HEMOGLOBIN g/dL 12.7 14.7   PLATELETS 10*3/mm3 232 294     Results from last 7 days   Lab Units 05/07/20  0410 05/06/20  0943   SODIUM mmol/L 136 134*   POTASSIUM mmol/L 3.5 3.5   CHLORIDE mmol/L 102 95*   CO2 mmol/L 23.0 22.9   BUN mg/dL 8 10   CREATININE mg/dL 0.67 0.69   GLUCOSE mg/dL 104* 123*   Estimated Creatinine Clearance: 94.4 mL/min (by C-G formula based on SCr of 0.67 mg/dL).  Results from last 7 days   Lab Units 05/07/20  0410 05/06/20  0943   ALBUMIN g/dL 3.70 4.80   BILIRUBIN mg/dL 0.6 0.4   ALK PHOS U/L 51 77   AST (SGOT) U/L 11 13   ALT (SGPT) U/L 13 18     Results from last 7 days   Lab Units 05/07/20  0410 05/06/20  0943   CALCIUM mg/dL 8.6 9.7   ALBUMIN g/dL 3.70 4.80       Results from last 7 days   Lab Units 05/06/20  0943   TROPONIN T ng/mL <0.010       Results from last 7 days   Lab Units 05/07/20  0410   CHOLESTEROL mg/dL 193   TRIGLYCERIDES mg/dL 223*   HDL CHOL mg/dL 52   LDL CHOL mg/dL 96           Test Results Pending at Discharge       Discharge Details        Discharge Medications      New Medications      Instructions  Start Date   aspirin 81 MG EC tablet   81 mg, Oral, Daily      atorvastatin 80 MG tablet  Commonly known as:  LIPITOR   80 mg, Oral, Nightly         Continue These Medications      Instructions Start Date   lisinopril 10 MG tablet  Commonly known as:  PRINIVIL,ZESTRIL   10 mg, Oral, Daily      ondansetron 4 MG tablet  Commonly known as:  Zofran   4 mg, Oral, Every 8 Hours PRN      pantoprazole 40 MG EC tablet  Commonly known as:  Protonix   40 mg, Oral, Daily      Selenium 100 MCG capsule   No dose, route, or frequency recorded.      Tirosint 75 MCG capsule  Generic drug:  levothyroxine sodium   75 mcg, Oral, Daily      Tylenol 8 Hour Arthritis Pain 650 MG 8 hr tablet  Generic drug:  acetaminophen   No dose, route, or frequency recorded.         Stop These Medications    omeprazole 40 MG capsule  Commonly known as:  priLOSEC     rosuvastatin 20 MG tablet  Commonly known as:  Crestor            No Known Allergies      Discharge Disposition:  Home or Self Care    Discharge Diet:  Diet Order   Procedures   • Diet Soft Texture; Whole Foods; Thin       Discharge Activity:   Activity Instructions     Activity as Tolerated            CODE STATUS:    Code Status and Medical Interventions:   Ordered at: 05/06/20 1205     Level Of Support Discussed With:    Patient     Code Status:    CPR     Medical Interventions (Level of Support Prior to Arrest):    Full       Future Appointments   Date Time Provider Department Center   5/21/2020 10:45 AM Kindra Castillo MD [facility] None   8/11/2020  3:00 PM Brittnee Aguilera APRN [facility]   11/18/2020  8:00 AM [facility] None   12/2/2020  8:15 AM Pauline Morgan MD [facility] None     Follow-up Information     James Bob MD Follow up in 2 week(s).    Specialty:  Internal Medicine  Contact information:  [address] [facility] [address] [phone]             [facility] NEUROLOGY Follow up in 3 month(s).    Specialty:   Neurology  Contact information:  Jaylyn Gomes  [address] [address] [phone]  Additional information:  Phone Number: [phone]      [facility] and across [facility]. Located in [facility] with the number [id number] on the building.                 Time Spent on Discharge:  Greater than 30 minutes      ROBERT Snider  [facility]  05/07/20  2:10 PM

## 2020-05-07 NOTE — PROGRESS NOTES
"Adult Nutrition  Assessment/PES    Patient Name:  Macie Sarabia  YOB: 1970  MRN: [id number]  Admit Date:  5/6/2020    Assessment Date:  5/7/2020    Comments:  Nutrition screen completed. Pt on Soft, grd, thin liquids diet with no soy, dairy, and gluten free. Pt had c/o difficulty swallowing on admission. She states it is better this am and usually does not have any problems swallowing. Food pref's obtained.    Reason for Assessment     Row Name 05/07/20 0937          Reason for Assessment    Reason For Assessment  identified at risk by screening criteria     Diagnosis  -- Hashimoto's thyroiditis and hypothyroidism, hyperlipidemia, hypertension who presented to the emergency room with complaints of a near syncopal episode          Nutrition/Diet History     Row Name 05/07/20 0938          Nutrition/Diet History    Typical Food/Fluid Intake  pt on grd meats due to c/o swallowing on admission but usually has no problems. Better today per pt         Anthropometrics     Row Name 05/07/20 0938          Anthropometrics    Height  157.5 cm (62.01\")        Admit Weight    Admit Weight  73.5 kg (162 lb) current        Ideal Body Weight (IBW)    Ideal Body Weight (IBW) (kg)  50.45        Body Mass Index (BMI)    BMI Assessment  -- BMI 29.68         Labs/Tests/Procedures/Meds     Row Name 05/07/20 0940          Labs/Procedures/Meds    Lab Results Reviewed  reviewed        Diagnostic Tests/Procedures    Diagnostic Test/Procedure Reviewed  reviewed        Medications    Pertinent Medications Reviewed  reviewed     Pertinent Medications Comments  asa, lipitor, IVF at 74         Physical Findings     Row Name 05/07/20 0941          Physical Findings    Overall Physical Appearance  overweight     Skin  -- karthikeyan 20         Estimated/Assessed Needs     Row Name 05/07/20 0940          Calculation Measurements    Height  157.5 cm (62.01\")         Nutrition Prescription Ordered     Row Name 05/07/20 0945          " Nutrition Prescription PO    Current PO Diet  Soft Texture     Texture  Ground     Common Modifiers  Gluten Free;Other (comment) no soy or gluten                 Problem/Interventions:  Problem 1     Row Name 05/07/20 0946          Nutrition Diagnoses Problem 1    Problem 1  Nutrition Appropriate for Condition at this Time     Etiology (related to)  Medical Diagnosis               Intervention Goal     Row Name 05/07/20 0946          Intervention Goal    General  Maintain nutrition     PO  Tolerate PO;Advance diet     Weight  Maintain weight         Nutrition Intervention     Row Name 05/07/20 0997          Nutrition Intervention    RD/Tech Action  Follow Tx progress;Care plan reviewd;Interview for preference           Education/Evaluation     Row Name 05/07/20 0977          Education    Education  Will Instruct as appropriate        Monitor/Evaluation    Monitor  Per protocol           Electronically signed by:  Pepper Padilla RD  05/07/20 09:47

## 2020-05-07 NOTE — CONSULTS
Neurology Note    Patient:  Macie Sarabia    YOB: 1970    REFERRING PHYSICIAN:  James Weiss    CHIEF COMPLAINT:    Difficulty with speech    HISTORY OF PRESENT ILLNESS:   The patient is a 49 y.o. female with h/o HLP, Hashimoto's, recently developed HT, started on lisinopril. Yday she got up around 0530, participated in an online work meeting from home 700-730 while standing up at her work station. Shortly after she felt suddenly lightheaded and slumped to the ground, denies LOC. She got up almost immediatly and started another on-line meeting during which she was having difficulty getting her words out, advised by coworkers to go to the ER. She reports some associated tingling on the right side of her head and head pressure. She checked her BP twice at home with an automated cuff and it was high. She denies a h/o similar symptoms,  migraine with aura. In ED BP was 210/110, NSR, mild right lower facial droop noted, CTH negative. She recieved labetolol 10 mg IV and started on aspirin and statin. Currently back to baseline. Her BP has gradually improved w/o additional meds, last 137/90.    Past Medical History:  Past Medical History:   Diagnosis Date   • Abnormal menses    • Anemia    • Dizziness     AT TIMES   • Fibroids    • GERD (gastroesophageal reflux disease)    • Hashimoto's disease    • Headache     ? ALLERGIES   • Hyperlipidemia    • Hypothyroidism        Past Surgical History:  Past Surgical History:   Procedure Laterality Date   • COLONOSCOPY N/A 1/31/2020    Procedure: COLONOSCOPY into cecum/terminal ileum;  Surgeon: Scooter Gross MD;  Location: [facility] ENDOSCOPY;  Service: Gastroenterology   • ELBOW PROCEDURE Left     AS CHILD   • ENDOSCOPY     • ENDOSCOPY N/A 1/31/2020    Procedure: ESOPHAGOGASTRODUODENOSCOPY with biopsy;  Surgeon: Scooter Gross MD;  Location: [facility] ENDOSCOPY;  Service: Gastroenterology   • LAPAROSCOPY WITH LASER      EXPLORATORY   • TOTAL  LAPAROSCOPIC HYSTERECTOMY N/A 11/11/2019    Procedure: TOTAL LAPAROSCOPIC HYSTERECTOMY BILATERAL SALPINGECTOMY;  Surgeon: Amanda Santiago MD;  Location: [facility] MAIN OR;  Service: Gynecology       Social History:   Social History     Socioeconomic History   • Marital status: Single     Spouse name: Not on file   • Number of children: Not on file   • Years of education: Not on file   • Highest education level: Not on file   Tobacco Use   • Smoking status: Never Smoker   • Smokeless tobacco: Never Used   Substance and Sexual Activity   • Alcohol use: Yes     Comment: SOCIALLY   • Drug use: No   • Sexual activity: Defer        Family History:   Family History   Adopted: Yes   Problem Relation Age of Onset   • Malig Hyperthermia Neg Hx        Medications Prior to Admission:    Prior to Admission medications    Medication Sig Start Date End Date Taking? Authorizing Provider   acetaminophen (Tylenol 8 Hour Arthritis Pain) 650 MG 8 hr tablet  12/1/17  Yes Margie Alston MD   lisinopril (PRINIVIL,ZESTRIL) 10 MG tablet TAKE 1 TABLET BY MOUTH DAILY 5/4/20  Yes Minna Roberto APRN   ondansetron (ZOFRAN) 4 MG tablet Take 1 tablet by mouth Every 8 (Eight) Hours As Needed for Nausea. 12/19/19  Yes Minna Roberto APRN   pantoprazole (PROTONIX) 40 MG EC tablet Take 1 tablet by mouth Daily. 2/7/20  Yes Minna Roberto APRN   rosuvastatin (Crestor) 20 MG tablet Take 1 tablet by mouth Every Night. 5/4/20 5/4/21 Yes Pauline Morgan MD   Selenium 100 MCG capsule  4/18/20  Yes Margie Alston MD   TIROSINT 75 MCG capsule Take 1 capsule by mouth Daily. 5/6/20  Yes Pauline Morgan MD   omeprazole (priLOSEC) 40 MG capsule Daily As Needed. 1/29/20   Margie Alston MD       Allergies:  Patient has no known allergies.      Review of system  Review of Systems   Neurological: Positive for dizziness, syncope, speech difficulty and headaches.       Vitals:    05/06/20 2313   BP: 137/90   Pulse:    Resp: 17   Temp: 98  °F (36.7 °C)   SpO2:        Physical exam  Physical Exam   Constitutional: She is oriented to person, place, and time. She appears well-developed and well-nourished.   HENT:   Head: Normocephalic and atraumatic.   Cardiovascular: Normal rate and regular rhythm.   Pulmonary/Chest: Effort normal.   Neurological: She is alert and oriented to person, place, and time. She has normal strength and normal reflexes. She displays normal reflexes. No cranial nerve deficit or sensory deficit. She exhibits normal muscle tone. Coordination normal. She displays no Babinski's sign on the right side. She displays no Babinski's sign on the left side.   Psychiatric: She has a normal mood and affect. Her behavior is normal. Thought content normal.         Lab Results   Component Value Date    WBC 5.47 05/07/2020    HGB 12.7 05/07/2020    HCT 37.8 05/07/2020    MCV 98.2 (H) 05/07/2020     05/07/2020     Lab Results   Component Value Date    GLUCOSE 104 (H) 05/07/2020    BUN 8 05/07/2020    CREATININE 0.67 05/07/2020    EGFRIFNONA 93 05/07/2020    EGFRIFAFRI 113 04/28/2020    BCR 11.9 05/07/2020    CO2 23.0 05/07/2020    CALCIUM 8.6 05/07/2020    PROTENTOTREF 7.0 04/28/2020    ALBUMIN 3.70 05/07/2020    LABIL2 1.8 04/28/2020    AST 11 05/07/2020    ALT 13 05/07/2020     Hemoglobin A1C  4.80 - 5.60 % 4.95    Contains abnormal data Lipid Panel   Order: [id number]   Status:  Final result   Visible to patient:  No (Not Released) Next appt:  05/21/2020 at 10:45 AM in Sleep Medicine (Kindra Castillo MD)   Specimen Information: Blood        Component  Ref Range & Units 04:10 9d ago 3mo ago 5mo ago   Total Cholesterol  0 - 200 mg/dL 193  272High  R 252High   252High     Triglycerides  0 - 150 mg/dL 223High   147 R 178High   295High     HDL Cholesterol  40 - 60 mg/dL 52   70High   51    LDL Cholesterol   0 - 100 mg/dL 96   146High   142High     VLDL Cholesterol  5 - 40 mg/dL 44.6High    35.6  59High             TSH  0.270 - 4.200  uIU/mL 5.720High         ECG 12 Lead   Order: [id number]   Status:  Final result   Visible to patient:  No (Not Released) Next appt:  2020 at 10:45 AM in Sleep Medicine (Kindra Castillo MD)      Narrative & Impression     HEART RATE= 86  bpm  RR Interval= 696  ms  ME Interval= 156  ms  P Horizontal Axis= -9  deg  P Front Axis= 67  deg  QRSD Interval= 87  ms  QT Interval= 387  ms  QRS Axis= 31  deg  T Wave Axis= 9  deg  - NORMAL ECG -  Sinus rhythm  No change from prior tracing  Electronically Signed By: Reese Elizabeth ([facility]) ([facility]) 06-May-2020 14:18:09  Date and Time of Study: 2020 10:17:56      Specimen Collected: 20 10:17 Last Resulted: 20 14:18              Echo Complete w/ Doppler, Color Flow and Contrast   Order# [id number]   Reading physician: Amanda Nielsen MD Ordering physician: James Weiss MD Study date: 20   Patient Information     Patient Name  Macie Sarabia MRN  [id number] Sex  Female  (Age)  1970 (49 y.o.)   Admission Information     Admission Date/Time Discharge Date/Time Room/Bed   20  0932  N540/1   Sedation Narrator Report     Sedation Narrator Report   Interpretation Summary     · Left ventricular diastolic dysfunction (grade I) consistent with impaired relaxation.  · Left ventricular systolic function is normal.  · Calculated EF = 75%.  · Saline test results are negative.          Ct Angiogram Neck    Result Date: 2020  CT ANGIOGRAM HEAD-, CT ANGIOGRAM NECK-  INDICATIONS: Stroke  TECHNIQUE: Radiation dose reduction techniques were utilized, including automated exposure control and exposure modulation based on body size.Noncontrast head CT was performed, followed by enhanced CT angiography of the head and neck. Three-dimensional reconstructions were created, reviewed, and assessed according to NASCET criteria.  COMPARISON: Noncontrast head CT from 2020  FINDINGS:  No acute intracranial hemorrhage, midline shift or mass  effect. 5 mm pleural-based calcification posteriorly at the right middle cranial fossa, potentially calcified meningioma. No acute territorial infarct is identified. Basal ganglia mineralization is present.  No enhancing lesions of brain are noted following contrast material administration.  Ventricles, cisterns, cerebral sulci are unremarkable for patient age.  Likely mucous retention cyst or polyp in the left sphenoid sinus. The visualized paranasal sinuses, orbits, mastoid air cells are otherwise unremarkable. Multiple calcifications are seen in the right parotid gland. Degenerative change is seen at the temporomandibular joints.  The CT angiography images show no hemodynamically significant focal stenosis, aneurysm, or dissection in the cervical carotid or vertebral arteries, or in the arteries at the base of the brain. Left A1 segment is dominant. Anterior and bilateral posterior communicating arteries are patent.   Partly included lung apices show no focal pulmonary consolidation.  Facet and uncovertebral joint hypertrophy contribute to bilateral neural foraminal narrowing, more prominent on the right at C3/4, C5/6, and on the left at C4/5. Disc osteophyte complex appears to result in mild central stenosis at C2/3, C3/4, moderate central stenosis at C4/5, C5/6. If there is further clinical concern, MRI of the cervical spine could be considered for further evaluation.       1. No hemodynamically significant focal stenosis, aneurysm, or dissection in the cervical carotid or vertebral arteries, or in the arteries at the base of the brain.  2. No acute intracranial hemorrhage or hydrocephalus. No enhancing lesion in brain. If there is further clinical concern, MRI could be considered for further evaluation.  This report was finalized on 5/6/2020 9:03 PM by Dr. Pardeep Guzman M.D.      Mri Brain Without Contrast    Result Date: 5/7/2020  MRI BRAIN WITHOUT CONTRAST 05/07/2020  CLINICAL HISTORY: Dizziness, right  facial droop, headache, evaluate for stroke.  TECHNIQUE: Axial T1, FLAIR, fat-suppressed T2, axial diffusion and gradient echo T2 and sagittal T1-weighted images were obtained of the entire head.  COMPARISON: This is correlated to a CT angiogram of the head and neck yesterday, 05/06/2020, at 5:37 PM.  FINDINGS: There are scattered tiny nodular foci of T2 high signal in the cerebral white matter that are either idiopathic or related to very minimal small vessel disease. The remainder of the brain parenchyma is normal in signal intensity. Specifically, no diffusion-weighted abnormality is seen with no acute infarct identified. On the gradient echo T2-weighted images, no acute or old blood breakdown products are seen intracranially. The ventricles are normal in size. I see no mass effect and no midline shift, and no extraaxial fluid sections are identified. The paranasal sinuses, the mastoid air cells and the middle ear cavities are clear. Good flow voids are demonstrated in the cerebral vessels and in the dural venous sinuses.      1. There are scattered tiny patchy nodular foci of T2 high signal in the cerebral white matter that are either idiopathic or related to minimal small vessel disease, and the remainder of the MRI of the brain is within normal limits. Specifically, no acute infarct is identified and the etiology of the dizziness, right facial droop and headache is not established on this exam.  This report was finalized on 5/7/2020 11:17 AM by Dr. James Walls M.D.      Xr Chest 1 View    Result Date: 5/6/2020  Portable chest radiograph  HISTORY:Stroke  TECHNIQUE: Single AP portable radiograph of the chest  COMPARISON:None      FINDINGS AND IMPRESSION: There is no pulmonary consolidation. No pneumothorax or pleural effusion is seen. Heart is normal in size.  This report was finalized on 5/6/2020 10:27 AM by Dr. Ruben Meyer M.D.      Ct Angiogram Head    Result Date: 5/6/2020  CT ANGIOGRAM HEAD-, CT  ANGIOGRAM NECK-  INDICATIONS: Stroke  TECHNIQUE: Radiation dose reduction techniques were utilized, including automated exposure control and exposure modulation based on body size.Noncontrast head CT was performed, followed by enhanced CT angiography of the head and neck. Three-dimensional reconstructions were created, reviewed, and assessed according to NASCET criteria.  COMPARISON: Noncontrast head CT from 05/06/2020  FINDINGS:  No acute intracranial hemorrhage, midline shift or mass effect. 5 mm pleural-based calcification posteriorly at the right middle cranial fossa, potentially calcified meningioma. No acute territorial infarct is identified. Basal ganglia mineralization is present.  No enhancing lesions of brain are noted following contrast material administration.  Ventricles, cisterns, cerebral sulci are unremarkable for patient age.  Likely mucous retention cyst or polyp in the left sphenoid sinus. The visualized paranasal sinuses, orbits, mastoid air cells are otherwise unremarkable. Multiple calcifications are seen in the right parotid gland. Degenerative change is seen at the temporomandibular joints.  The CT angiography images show no hemodynamically significant focal stenosis, aneurysm, or dissection in the cervical carotid or vertebral arteries, or in the arteries at the base of the brain. Left A1 segment is dominant. Anterior and bilateral posterior communicating arteries are patent.   Partly included lung apices show no focal pulmonary consolidation.  Facet and uncovertebral joint hypertrophy contribute to bilateral neural foraminal narrowing, more prominent on the right at C3/4, C5/6, and on the left at C4/5. Disc osteophyte complex appears to result in mild central stenosis at C2/3, C3/4, moderate central stenosis at C4/5, C5/6. If there is further clinical concern, MRI of the cervical spine could be considered for further evaluation.       1. No hemodynamically significant focal stenosis,  aneurysm, or dissection in the cervical carotid or vertebral arteries, or in the arteries at the base of the brain.  2. No acute intracranial hemorrhage or hydrocephalus. No enhancing lesion in brain. If there is further clinical concern, MRI could be considered for further evaluation.  This report was finalized on 5/6/2020 9:03 PM by Dr. Pardeep Guzman M.D.      Ct Head Without Contrast Stroke Protocol    Result Date: 5/6/2020  CT HEAD WITHOUT CONTRAST  CLINICAL HISTORY: Expressive aphasia, right-sided facial droop, and weakness.  TECHNIQUE: CT scan of the head was obtained with 3 mm axial images. No intravenous contrast was administered.  FINDINGS: The ventricles, sulci, and cisterns are age appropriate. The basal ganglia and thalami are unremarkable in appearance. The posterior fossa structures are within normal limits.  Incidental note is made of a mucous retention cyst within the sphenoid sinus.      There is no convincing evidence to suggest completed focus of acute infarction on this noncontrast head CT. Further evaluation could be performed with an MRI of the brain for much more sensitive and specific detection as clinically indicated.  These findings and recommendations were discussed with Macie Sexton PA-C, on 05/06/2020 at approximately 10:49 AM.  Radiation dose reduction techniques were utilized, including automated exposure control and exposure modulation based on body size.  This report was finalized on 5/6/2020 1:13 PM by Dr. Srinivas Hurt M.D.          During this visit the following were done:  Labs Reviewed [x]    Labs Ordered []    Radiology Reports Reviewed [x]    Radiology Ordered []    EKG, echo, and/or stress test reviewed [x]    EEG results reviewed  []    EEG reviewed and interpreted per myself   []    Discussed case with neurointerventionalist or neuroradiologist []    Referring Provider Records Reviewed []    ER Records Reviewed []    Hospital Records Reviewed []    History Obtained  From Family []    Radiological images view and Interpreted per myself [x]    Case Discussed with referring provider []     Decision to obtain and request outside records  []        Assessment and Plan     Stroke-like symptoms in the setting of hypertensive urgency, resolved. TIA vs atypical migraine. Unremarkable MRI and CTA encouraging, MRI with minimal chronic microvascular changes, personally reviewed.   - Continue aspirin 81 mg and statin on discharge.   - ZIO patch.   - BP < 130/80.   - Neurology clinic F/U in 3 months.    Thanks,              Electronically signed by Carmelo Bronson MD on 5/7/2020 at 11:32

## 2020-05-07 NOTE — PROGRESS NOTES
Discharge Planning Assessment  [facility]     Patient Name: Macie Sarabia  MRN: [id number]  Today's Date: 5/7/2020    Admit Date: 5/6/2020    Discharge Needs Assessment     Row Name 05/07/20 1322       Living Environment    Lives With  child(hayden), adult;parent(s)    Name(s) of Who Lives With Patient  Jj Rico [phone]    Current Living Arrangements  home/apartment/condo    Potentially Unsafe Housing Conditions  other (see comments) no concerns    Primary Care Provided by  self    Provides Primary Care For  parent(s)    Family Caregiver if Needed  child(hayden), adult    Family Caregiver Names  Jj Rico [phone]    Quality of Family Relationships  helpful    Able to Return to Prior Arrangements  yes       Resource/Environmental Concerns    Resource/Environmental Concerns  none       Transition Planning    Patient/Family Anticipates Transition to  home with family    Patient/Family Anticipated Services at Transition  none    Transportation Anticipated  family or friend will provide       Discharge Needs Assessment    Readmission Within the Last 30 Days  no previous admission in last 30 days    Concerns to be Addressed  denies needs/concerns at this time    Equipment Currently Used at Home  ramp    Anticipated Changes Related to Illness  none    Equipment Needed After Discharge  none    Provided Post Acute Provider List?  N/A    N/A Provider List Comment  Patient declined list and stated she did not anticipate any needs at this time        Discharge Plan     Row Name 05/07/20 1323       Plan    Plan  Plans to return home with family, denies needs    Provided Post Acute Provider Quality & Resource List?  N/A    N/A Quality & Resource List Comment  Patient does not anticipate any needs at this time    Patient/Family in Agreement with Plan  yes    Plan Comments  CCP met with patient at bedside. CCP role explained and discharge planning discussed. Face sheet verified. Patient denies having a living  will or power of  paperwork. Patient's PCP is Dr. James Bob. Patient lives with her adult son (Jj Rico [phone]), his significant other, and her mother. 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 Patient lives in a multi-level home with a ramp at the entrance. Patient's bedroom and bathroom are on the main level. Patient does not use DME at home and is independent with her ADLs. Patient denies needing assistance with food. Patient denies past home health and subacute rehab history. Patient declined HH/SNF list and stated she does not anticipate any needs at this time. Patient's pharmacy is [facility] on [facility] and patient is enrolled in meds to beds. Patient denies difficulty affording or remembering to take her medications. Patient's family will provide transportation home at discharge. Patient denies any discharge needs. CCP will follow to assist with discharge home and any needs that may arise. Prabha Salazar CSW        Destination      Coordination has not been started for this encounter.      Durable Medical Equipment      Coordination has not been started for this encounter.      Dialysis/Infusion      Coordination has not been started for this encounter.      Home Medical Care      Coordination has not been started for this encounter.      Therapy      Coordination has not been started for this encounter.      Community Resources      Coordination has not been started for this encounter.        Expected Discharge Date and Time     Expected Discharge Date Expected Discharge Time    May 7, 2020         Demographic Summary     Row Name 05/07/20 1322       General Information    Admission Type  observation    Arrived From  emergency department    Referral Source  admission list    Reason for Consult  discharge planning    Preferred Language  English     Used During This Interaction  no        Functional Status     Row Name 05/07/20 1322       Functional Status    Usual Activity  Tolerance  good    Current Activity Tolerance  good       Functional Status, IADL    Medications  independent    Meal Preparation  independent    Housekeeping  independent    Laundry  independent    Shopping  independent       Mental Status    General Appearance WDL  WDL       Mental Status Summary    Recent Changes in Mental Status/Cognitive Functioning  no changes       Employment/    Employment Status  employed full time        Psychosocial    No documentation.       Abuse/Neglect    No documentation.       Legal    No documentation.       Substance Abuse    No documentation.       Patient Forms    No documentation.           YEHUDA Connor

## 2020-05-08 ENCOUNTER — TRANSITIONAL CARE MANAGEMENT TELEPHONE ENCOUNTER (OUTPATIENT)
Dept: CALL CENTER | Facility: HOSPITAL | Age: 50
End: 2020-05-08

## 2020-05-08 NOTE — OUTREACH NOTE
Call Center TCM Note      Responses   [facility] patient discharged from?  [place]   Does the patient have one of the following disease processes/diagnoses(primary or secondary)?  Stroke (TIA)   TCM attempt successful?  No   Unsuccessful attempts  Attempt 1   Call Status  Left message          Caitlin Kamara MA    5/8/2020, 10:02

## 2020-05-08 NOTE — OUTREACH NOTE
Call Center TCM Note      Responses   [facility] [place] patient discharged from?  [place]   Does the patient have one of the following disease processes/diagnoses(primary or secondary)?  Stroke (TIA)   TCM attempt successful?  No   Unsuccessful attempts  Attempt 2   Call Status  Left message          Caitlin Kamara MA    5/8/2020, 13:37

## 2020-05-08 NOTE — OUTREACH NOTE
Prep Survey      Responses   [facility] facility patient discharged from?  [place]   Is LACE score < 7 ?  Yes   Eligibility  [facility]   Date of Admission  05/06/20   Date of Discharge  05/07/20   Discharge Disposition  Home or Self Care   Discharge diagnosis  HTN vs TIA   COVID-19 Test Status  Not tested   Does the patient have one of the following disease processes/diagnoses(primary or secondary)?  Stroke (TIA)   Does the patient have Home health ordered?  No   Is there a DME ordered?  No   Medication alerts for this patient  Meds to Beds   Prep survey completed?  Yes          Anastasia Townsend, RN

## 2020-05-08 NOTE — PROGRESS NOTES
Case Management Discharge Note      Final Note: Home no additional dc orders noted. Saúl APARICIO/CCP    Provided Post Acute Provider List?: N/A  N/A Provider List Comment: Patient declined list and stated she did not anticipate any needs at this time  Provided Post Acute Provider Quality & Resource List?: N/A  N/A Quality & Resource List Comment: Patient does not anticipate any needs at this time    Destination      No service has been selected for the patient.      Durable Medical Equipment      No service has been selected for the patient.      Dialysis/Infusion      No service has been selected for the patient.      Home Medical Care      No service has been selected for the patient.      Therapy      No service has been selected for the patient.      Community Resources      No service has been selected for the patient.        Transportation Services  Private: Car    Final Discharge Disposition Code: 01 - home or self-care

## 2020-05-11 ENCOUNTER — TRANSITIONAL CARE MANAGEMENT TELEPHONE ENCOUNTER (OUTPATIENT)
Dept: CALL CENTER | Facility: HOSPITAL | Age: 50
End: 2020-05-11

## 2020-05-11 NOTE — OUTREACH NOTE
Call Center TCM Note      Responses   [facility] patient discharged from?  [place]   Does the patient have one of the following disease processes/diagnoses(primary or secondary)?  Stroke (TIA)   TCM attempt successful?  No   Unsuccessful attempts  Attempt 3          Mary Roth RN    5/11/2020, 15:00

## 2020-05-18 ENCOUNTER — READMISSION MANAGEMENT (OUTPATIENT)
Dept: CALL CENTER | Facility: HOSPITAL | Age: 50
End: 2020-05-18

## 2020-05-18 NOTE — OUTREACH NOTE
Stroke Week 2 Survey      Responses   [facility] patient discharged from?  [place]   Does the patient have one of the following disease processes/diagnoses(primary or secondary)?  Stroke (TIA)   Week 2 attempt successful?  Yes   Call start time  1009   Call end time  1021   Discharge diagnosis  HTN vs TIA   Is patient permission given to speak with other caregiver?  No   Meds reviewed with patient/caregiver?  Yes   Is the patient having any side effects they believe may be caused by any medication additions or changes?  No   Does the patient have all medications ordered at discharge?  Yes   Is the patient taking all medications as directed (includes completed medication regime)?  Yes   Does the patient have a primary care provider?   Yes   Does the patient have an appointment with their PCP within 7 days of discharge?  No   Comments regarding PCP  PCP Dr James Bob   Nursing Interventions  Educated patient on importance of making appointment, Advised patient to make appointment   Has the patient kept scheduled appointments due by today?  Yes   Comments  Patient states that she has her 3 month neurology appt in place and has been following up with endocrinology.    Has home health visited the patient within 72 hours of discharge?  N/A   Psychosocial issues?  No   Does the patient require any assistance with activities of daily living such as eating, bathing, dressing, walking, etc.?  No   Does the patient have any residual symptoms from stroke/TIA?  Yes   Residual symptoms comments  Continues to feel exhausted and generally weak. Also reports continues to have headaches. Speech problems resolved.    Does the patient understand the diet ordered at discharge?  Yes   Did the patient receive a copy of their discharge instructions?  Yes   Nursing interventions  Reviewed instructions with patient   What is the patient's perception of their health status since discharge?  Improving   Nursing interventions  Nurse  provided patient education   Is the patient able to teach back FAST for Stroke?  Yes   Is the patient/caregiver able to teach back the risk factors for a stroke?  High blood pressure-goal below 120/80, High Cholesterol   Is the patient/caregiver able to teach back signs and symptoms related to disease process for when to call PCP?  Yes   Is the patient/caregiver able to teach back signs and symptoms related to disease process for when to call 911?  Yes   Is the patient/caregiver able to teach back the hierarchy of who to call/visit for symptoms/problems? PCP, Specialist, Home health nurse, Urgent Care, ED, 911  Yes   Week 2 call completed?  Yes          Caitlin Garrison RN

## 2020-05-21 ENCOUNTER — APPOINTMENT (OUTPATIENT)
Dept: SLEEP MEDICINE | Facility: HOSPITAL | Age: 50
End: 2020-05-21

## 2020-05-26 ENCOUNTER — APPOINTMENT (OUTPATIENT)
Dept: SLEEP MEDICINE | Facility: HOSPITAL | Age: 50
End: 2020-05-26

## 2020-05-27 ENCOUNTER — READMISSION MANAGEMENT (OUTPATIENT)
Dept: CALL CENTER | Facility: HOSPITAL | Age: 50
End: 2020-05-27

## 2020-05-27 NOTE — OUTREACH NOTE
Stroke Week 3 Survey      Responses   [facility] patient discharged from?  [place]   Does the patient have one of the following disease processes/diagnoses(primary or secondary)?  Stroke (TIA)   Week 3 attempt successful?  No   Unsuccessful attempts  Attempt 1          Jessica Duenas RN

## 2020-05-28 ENCOUNTER — READMISSION MANAGEMENT (OUTPATIENT)
Dept: CALL CENTER | Facility: HOSPITAL | Age: 50
End: 2020-05-28

## 2020-05-28 PROCEDURE — 0298T HOLTER MONITOR - 72 HOUR UP TO 21 DAY: CPT | Performed by: INTERNAL MEDICINE

## 2020-05-28 NOTE — PROGRESS NOTES
Patient evaluated at [facility]-essentially sha Holter cardiac monitor. Keep follow up with [facility] as scheduled. Thanks, ROBERT Johnston covering for Dr. [facility]

## 2020-05-28 NOTE — OUTREACH NOTE
Stroke Week 3 Survey      Responses   [facility] patient discharged from?  [place]   Does the patient have one of the following disease processes/diagnoses(primary or secondary)?  Stroke (TIA)   Week 3 attempt successful?  No   Unsuccessful attempts  Attempt 2          Tristan Mosqueda RN

## 2020-07-29 RX ORDER — LISINOPRIL 10 MG/1
10 TABLET ORAL DAILY
Qty: 30 TABLET | Refills: 2 | Status: SHIPPED | OUTPATIENT
Start: 2020-07-29 | End: 2020-10-26

## 2020-10-12 ENCOUNTER — RESULTS ENCOUNTER (OUTPATIENT)
Dept: ENDOCRINOLOGY | Age: 50
End: 2020-10-12

## 2020-10-12 DIAGNOSIS — N95.1 SYMPTOMATIC MENOPAUSAL OR FEMALE CLIMACTERIC STATES: ICD-10-CM

## 2020-10-12 DIAGNOSIS — E78.2 MIXED DYSLIPIDEMIA: ICD-10-CM

## 2020-10-12 DIAGNOSIS — I10 ESSENTIAL HYPERTENSION: ICD-10-CM

## 2020-10-12 DIAGNOSIS — E06.3 HYPOTHYROIDISM DUE TO HASHIMOTO'S THYROIDITIS: ICD-10-CM

## 2020-10-12 DIAGNOSIS — E55.9 VITAMIN D DEFICIENCY: ICD-10-CM

## 2020-10-12 DIAGNOSIS — E03.8 HYPOTHYROIDISM DUE TO HASHIMOTO'S THYROIDITIS: ICD-10-CM

## 2020-10-26 RX ORDER — LISINOPRIL 10 MG/1
10 TABLET ORAL DAILY
Qty: 30 TABLET | Refills: 2 | Status: SHIPPED | OUTPATIENT
Start: 2020-10-26 | End: 2021-01-14

## 2021-01-05 ENCOUNTER — OFFICE VISIT (OUTPATIENT)
Dept: ENDOCRINOLOGY | Age: 51
End: 2021-01-05

## 2021-01-05 VITALS
SYSTOLIC BLOOD PRESSURE: 144 MMHG | RESPIRATION RATE: 16 BRPM | WEIGHT: 154.4 LBS | BODY MASS INDEX: 28.41 KG/M2 | DIASTOLIC BLOOD PRESSURE: 92 MMHG | HEIGHT: 62 IN

## 2021-01-05 DIAGNOSIS — E55.9 VITAMIN D DEFICIENCY: ICD-10-CM

## 2021-01-05 DIAGNOSIS — E06.3 HYPOTHYROIDISM DUE TO HASHIMOTO'S THYROIDITIS: Primary | ICD-10-CM

## 2021-01-05 DIAGNOSIS — E78.2 MIXED DYSLIPIDEMIA: ICD-10-CM

## 2021-01-05 DIAGNOSIS — E03.8 HYPOTHYROIDISM DUE TO HASHIMOTO'S THYROIDITIS: Primary | ICD-10-CM

## 2021-01-05 PROCEDURE — 99214 OFFICE O/P EST MOD 30 MIN: CPT | Performed by: NURSE PRACTITIONER

## 2021-01-05 RX ORDER — ROSUVASTATIN CALCIUM 20 MG/1
20 TABLET, COATED ORAL DAILY
COMMUNITY
End: 2021-01-06 | Stop reason: HOSPADM

## 2021-01-05 RX ORDER — PHENOL 1.4 %
1 AEROSOL, SPRAY (ML) MUCOUS MEMBRANE DAILY
COMMUNITY
End: 2021-05-20

## 2021-01-05 NOTE — PROGRESS NOTES
Chief Complaint  Hypothyroidism, Hypertension, and Vitamin D Deficiency    Subjective          Macie Sarabia presents to [facility] for     History of Present Illness     Hypothyroid   Diagnosed around 2017  Positive hashimotos  Worsening symptoms   Was on levothyroxine without improvement   Changed to Tirosint (RIGO) after her last visit with Dr Morgan   Currently on 75mcg daily  C/o weight gain of 10 pounds in one week, reports her weight was incorrectly enter into the system in May 2020.   Says her weight is always around 130-140. And now has increased abdominal weight and bloating.  Reports eating a healthy diet and exercising  Her 24 hours food recall: tea and a banana, shrimp for lunch, shrimp and salad for dinner  C/o worsening joint pain, all over her body. Nothing makes it better. It has waxed and waned since she was a teenager. Does not see Rheumatology. She reports she wanted to go to the ER bc the pain was so bad in her joints. She thought she needed pain medication. She denies CP and SOA. She had stroke like symptoms in the last fews months and reports a negative workup.  Has decreased memory/ forgetful    Social History     Substance and Sexual Activity   Alcohol Use Yes    Comment: SOCIALLY     Social History     Tobacco Use   Smoking Status Never Smoker   Smokeless Tobacco Never Used         hyperlipidemia  Off medication currently     H/o Hysterectomy 2018 r/t Increased bleeding     Review of Systems   Constitutional: Positive for fatigue and unexpected weight change. Negative for activity change and appetite change.   HENT: Negative for sore throat, trouble swallowing and voice change.    Respiratory: Negative for cough, choking and shortness of breath.    Cardiovascular: Positive for leg swelling (at night). Negative for chest pain and palpitations.   Gastrointestinal: Negative for abdominal pain, constipation, diarrhea, nausea and vomiting.   Endocrine: Negative  "for cold intolerance, heat intolerance, polydipsia, polyphagia and polyuria.   Genitourinary: Negative for decreased urine volume, dysuria, flank pain and urgency.   Musculoskeletal: Positive for arthralgias and myalgias. Negative for gait problem.   Skin: Negative for color change, rash and wound.   Neurological: Negative for dizziness, weakness, light-headedness, numbness and headaches.   Hematological: Does not bruise/bleed easily.       Objective   Vital Signs:   /92   Resp 16   Ht 157.5 cm (62\")   Wt 70 kg (154 lb 6.4 oz)   BMI 28.24 kg/m²     Physical Exam  Vitals signs reviewed.   Constitutional:       General: She is not in acute distress.  HENT:      Head: Normocephalic and atraumatic.   Neck:      Musculoskeletal: Normal range of motion and neck supple.   Cardiovascular:      Rate and Rhythm: Normal rate and regular rhythm.   Pulmonary:      Effort: Pulmonary effort is normal. No respiratory distress.   Musculoskeletal: Normal range of motion.         General: No signs of injury.   Skin:     General: Skin is warm and dry.   Neurological:      Mental Status: She is alert and oriented to person, place, and time. Mental status is at baseline.   Psychiatric:         Mood and Affect: Mood normal.         Behavior: Behavior normal.         Thought Content: Thought content normal.         Judgment: Judgment normal.        Result Review :   The following data was reviewed by: ROBERT Lewis on 01/05/2021:  CMP    CMP 4/28/20 5/6/20 5/7/20   Glucose 106 (A)     BUN 13 10 8   Creatinine 0.67 0.69 0.67   eGFR Non  Am 93 90 93   eGFR African Am 113     Sodium 139 134 (A) 136   Potassium 4.1 3.5 3.5   Chloride 99 95 (A) 102   Calcium 9.5 9.7 8.6   Total Protein 7.0     Albumin 4.50 4.80 3.70   Globulin 2.5     Total Bilirubin 0.4 0.4 0.6   Alkaline Phosphatase 66 77 51   AST (SGOT) 14 13 11   ALT (SGPT) 14 18 13   (A) Abnormal value            Lipid Panel    Lipid Panel 2/7/20 4/28/20 5/7/20 "   Total Cholesterol  272 (A)    Triglycerides 178 (A) 147 223 (A)   HDL Cholesterol 70 (A)  52   VLDL Cholesterol 35.6  44.6 (A)   LDL Cholesterol  146 (A)  96   LDL/HDL Ratio 2.09  1.85   (A) Abnormal value            TSH    TSH 2/7/20 4/28/20 5/6/20   TSH 4.440 (A) 2.420 5.720 (A)   (A) Abnormal value       Comments are available for some flowsheets but are not being displayed.           Data reviewed: Radiologic studies thyroid u/s       IMPRESSION: 2/15/2020  1.  There is mild increase in vascularity diffusely. Findings are  nonspecific but can be seen in the setting of thyroiditis and  correlation with patient history and laboratory values is recommended to  attempt to establish the most appropriate etiology.  2.  No findings of suspicious focal thyroid nodule per TIRADS criteria.     Assessment and Plan    Problem List Items Addressed This Visit        Other    Mixed dyslipidemia    Relevant Orders    Comprehensive Metabolic Panel    Lipid Panel    Vitamin D deficiency    Relevant Orders    Vitamin D 25 Hydroxy    Hypothyroidism due to Hashimoto's thyroiditis - Primary    Relevant Orders    TSH    T4, Free    T3, Free    Comprehensive Metabolic Panel    Hemoglobin A1c    Lipid Panel    Microalbumin / Creatinine Urine Ratio - Urine, Clean Catch    Vitamin D 25 Hydroxy    Vitamin B12 & Folate    C-Peptide        I spent 30 minutes caring for Macie on this date of service. This time includes time spent by me in the following activities:preparing for the visit, reviewing tests, performing a medically appropriate examination and/or evaluation , counseling and educating the patient/family/caregiver and ordering medications, tests, or procedures     Follow Up   Return in about 6 months (around 7/5/2021).     Consider PCP eval if TSH and other labs normal, ?Rheum eval   Advised patient to decrease shrimp and seafood intake and record her food intake over 3 days to assess caloric intake and consider dietician  consult.  Patient is following a gluten free diet      Patient was given instructions and counseling regarding her condition or for health maintenance advice. Please see specific information pulled into the AVS if appropriate.     ROBERT Lewis

## 2021-01-05 NOTE — PATIENT INSTRUCTIONS
Hypothyroidism    Hypothyroidism is when the thyroid gland does not make enough of certain hormones (it is underactive). The thyroid gland is a small gland located in the lower front part of the neck, just in front of the windpipe (trachea). This gland makes hormones that help control how the body uses food for energy (metabolism) as well as how the heart and brain function. These hormones also play a role in keeping your bones strong. When the thyroid is underactive, it produces too little of the hormones thyroxine (T4) and triiodothyronine (T3).  What are the causes?  This condition may be caused by:  · Hashimoto's disease. This is a disease in which the body's disease-fighting system (immune system) attacks the thyroid gland. This is the most common cause.  · Viral infections.  · Pregnancy.  · Certain medicines.  · Birth defects.  · Past radiation treatments to the head or neck for cancer.  · Past treatment with radioactive iodine.  · Past exposure to radiation in the environment.  · Past surgical removal of part or all of the thyroid.  · Problems with a gland in the center of the brain (pituitary gland).  · Lack of enough iodine in the diet.  What increases the risk?  You are more likely to develop this condition if:  · You are female.  · You have a family history of thyroid conditions.  · You use a medicine called lithium.  · You take medicines that affect the immune system (immunosuppressants).  What are the signs or symptoms?  Symptoms of this condition include:  · Feeling as though you have no energy (lethargy).  · Not being able to tolerate cold.  · Weight gain that is not explained by a change in diet or exercise habits.  · Lack of appetite.  · Dry skin.  · Coarse hair.  · Menstrual irregularity.  · Slowing of thought processes.  · Constipation.  · Sadness or depression.  How is this diagnosed?  This condition may be diagnosed based on:  · Your symptoms, your medical history, and a physical exam.  · Blood  tests.  You may also have imaging tests, such as an ultrasound or MRI.  How is this treated?  This condition is treated with medicine that replaces the thyroid hormones that your body does not make. After you begin treatment, it may take several weeks for symptoms to go away.  Follow these instructions at home:  · Take over-the-counter and prescription medicines only as told by your health care provider.  · If you start taking any new medicines, tell your health care provider.  · Keep all follow-up visits as told by your health care provider. This is important.  ? As your condition improves, your dosage of thyroid hormone medicine may change.  ? You will need to have blood tests regularly so that your health care provider can monitor your condition.  Contact a health care provider if:  · Your symptoms do not get better with treatment.  · You are taking thyroid replacement medicine and you:  ? Sweat a lot.  ? Have tremors.  ? Feel anxious.  ? Lose weight rapidly.  ? Cannot tolerate heat.  ? Have emotional swings.  ? Have diarrhea.  ? Feel weak.  Get help right away if you have:  · Chest pain.  · An irregular heartbeat.  · A rapid heartbeat.  · Difficulty breathing.  Summary  · Hypothyroidism is when the thyroid gland does not make enough of certain hormones (it is underactive).  · When the thyroid is underactive, it produces too little of the hormones thyroxine (T4) and triiodothyronine (T3).  · The most common cause is Hashimoto's disease, a disease in which the body's disease-fighting system (immune system) attacks the thyroid gland. The condition can also be caused by viral infections, medicine, pregnancy, or past radiation treatment to the head or neck.  · Symptoms may include weight gain, dry skin, constipation, feeling as though you do not have energy, and not being able to tolerate cold.  · This condition is treated with medicine to replace the thyroid hormones that your body does not make.  This information  is not intended to replace advice given to you by your health care provider. Make sure you discuss any questions you have with your health care provider.  Document Revised: 11/30/2018 Document Reviewed: 11/28/2018  Elsevier Patient Education © 2020 [employer]        Low-Purine Eating Plan  A low-purine eating plan involves making food choices to limit your intake of purine. Purine is a kind of uric acid. Too much uric acid in your blood can cause certain conditions, such as gout and kidney stones. Eating a low-purine diet can help control these conditions.  What are tips for following this plan?  Reading food labels    · Avoid foods with saturated or Trans fat.  · Check the ingredient list of grains-based foods, such as bread and cereal, to make sure that they contain whole grains.  · Check the ingredient list of sauces or soups to make sure they do not contain meat or fish.  · When choosing soft drinks, check the ingredient list to make sure they do not contain high-fructose corn syrup.  Shopping  · Buy plenty of fresh fruits and vegetables.  · Avoid buying canned or fresh fish.  · Buy dairy products labeled as low-fat or nonfat.  · Avoid buying premade or processed foods. These foods are often high in fat, salt (sodium), and added sugar.  Cooking  · Use olive oil instead of butter when cooking. Oils like olive oil, canola oil, and sunflower oil contain healthy fats.  Meal planning  · Learn which foods do or do not affect you. If you find out that a food tends to cause your gout symptoms to flare up, avoid eating that food. You can enjoy foods that do not cause problems. If you have any questions about a food item, talk with your dietitian or health care provider.  · Limit foods high in fat, especially saturated fat. Fat makes it harder for your body to get rid of uric acid.  · Choose foods that are lower in fat and are lean sources of protein.  General guidelines  · Limit alcohol intake to no more than 1 drink  a day for nonpregnant women and 2 drinks a day for men. One drink equals 12 oz of beer, 5 oz of wine, or 1½ oz of hard liquor. Alcohol can affect the way your body gets rid of uric acid.  · Drink plenty of water to keep your urine clear or pale yellow. Fluids can help remove uric acid from your body.  · If directed by your health care provider, take a vitamin C supplement.  · Work with your health care provider and dietitian to develop a plan to achieve or maintain a healthy weight. Losing weight can help reduce uric acid in your blood.  What foods are recommended?  The items listed may not be a complete list. Talk with your dietitian about what dietary choices are best for you.  Foods low in purines  Foods low in purines do not need to be limited. These include:  · All fruits.  · All low-purine vegetables, pickles, and olives.  · Breads, pasta, rice, cornbread, and popcorn. Cake and other baked goods.  · All dairy foods.  · Eggs, nuts, and nut butters.  · Spices and condiments, such as salt, herbs, and vinegar.  · Plant oils, butter, and margarine.  · Water, sugar-free soft drinks, tea, coffee, and cocoa.  · Vegetable-based soups, broths, sauces, and gravies.  Foods moderate in purines  Foods moderate in purines should be limited to the amounts listed.  · ½ cup of asparagus, cauliflower, spinach, mushrooms, or green peas, each day.  · 2/3 cup uncooked oatmeal, each day.  · ¼ cup dry wheat bran or wheat germ, each day.  · 2-3 ounces of meat or poultry, each day.  · 4-6 ounces of shellfish, such as crab, lobster, oysters, or shrimp, each day.  · 1 cup cooked beans, peas, or lentils, each day.  · Soup, broths, or bouillon made from meat or fish. Limit these foods as much as possible.  What foods are not recommended?  The items listed may not be a complete list. Talk with your dietitian about what dietary choices are best for you.  Limit your intake of foods high in purines, including:  · Beer and other  alcohol.  · Meat-based gravy or sauce.  · Canned or fresh fish, such as:  ? Anchovies, sardines, herring, and tuna.  ? Mussels and scallops.  ? Codfish, trout, and juan.  · Rodriguez.  · Organ meats, such as:  ? Liver or kidney.  ? Tripe.  ? Sweetbreads (thymus gland or pancreas).  · Wild game or goose.  · Yeast or yeast extract supplements.  · Drinks sweetened with high-fructose corn syrup.  Summary  · Eating a low-purine diet can help control conditions caused by too much uric acid in the body, such as gout or kidney stones.  · Choose low-purine foods, limit alcohol, and limit foods high in fat.  · You will learn over time which foods do or do not affect you. If you find out that a food tends to cause your gout symptoms to flare up, avoid eating that food.  This information is not intended to replace advice given to you by your health care provider. Make sure you discuss any questions you have with your health care provider.  Document Revised: 11/30/2018 Document Reviewed: 01/31/2018  Elsevier Patient Education © 2020 [employer].

## 2021-01-06 ENCOUNTER — APPOINTMENT (OUTPATIENT)
Dept: CT IMAGING | Facility: HOSPITAL | Age: 51
End: 2021-01-06

## 2021-01-06 ENCOUNTER — HOSPITAL ENCOUNTER (EMERGENCY)
Facility: HOSPITAL | Age: 51
Discharge: HOME | End: 2021-01-06
Attending: EMERGENCY MEDICINE | Admitting: EMERGENCY MEDICINE

## 2021-01-06 ENCOUNTER — OFFICE VISIT (OUTPATIENT)
Dept: FAMILY MEDICINE CLINIC | Facility: CLINIC | Age: 51
End: 2021-01-06

## 2021-01-06 ENCOUNTER — APPOINTMENT (OUTPATIENT)
Dept: GENERAL RADIOLOGY | Facility: HOSPITAL | Age: 51
End: 2021-01-06

## 2021-01-06 VITALS
HEIGHT: 62 IN | HEART RATE: 82 BPM | SYSTOLIC BLOOD PRESSURE: 147 MMHG | RESPIRATION RATE: 18 BRPM | TEMPERATURE: 98.3 F | WEIGHT: 154 LBS | OXYGEN SATURATION: 98 % | DIASTOLIC BLOOD PRESSURE: 102 MMHG | BODY MASS INDEX: 28.34 KG/M2

## 2021-01-06 VITALS
HEIGHT: 62 IN | OXYGEN SATURATION: 98 % | WEIGHT: 153.2 LBS | BODY MASS INDEX: 28.19 KG/M2 | SYSTOLIC BLOOD PRESSURE: 174 MMHG | HEART RATE: 102 BPM | TEMPERATURE: 98.2 F | DIASTOLIC BLOOD PRESSURE: 98 MMHG

## 2021-01-06 DIAGNOSIS — E03.8 HYPOTHYROIDISM DUE TO HASHIMOTO'S THYROIDITIS: Primary | ICD-10-CM

## 2021-01-06 DIAGNOSIS — E06.3 HYPOTHYROIDISM DUE TO HASHIMOTO'S THYROIDITIS: Primary | ICD-10-CM

## 2021-01-06 DIAGNOSIS — R53.82 CHRONIC FATIGUE: ICD-10-CM

## 2021-01-06 DIAGNOSIS — R41.0 CONFUSION: ICD-10-CM

## 2021-01-06 DIAGNOSIS — R55 NEAR SYNCOPE: ICD-10-CM

## 2021-01-06 DIAGNOSIS — R42 DIZZINESS: ICD-10-CM

## 2021-01-06 DIAGNOSIS — I16.9 HYPERTENSIVE CRISIS WITHOUT CONGESTIVE HEART FAILURE: Primary | ICD-10-CM

## 2021-01-06 DIAGNOSIS — M25.50 ARTHRALGIA, UNSPECIFIED JOINT: ICD-10-CM

## 2021-01-06 DIAGNOSIS — R11.0 NAUSEA: ICD-10-CM

## 2021-01-06 LAB
25(OH)D3+25(OH)D2 SERPL-MCNC: 41.4 NG/ML (ref 30–100)
ALBUMIN SERPL-MCNC: 4.3 G/DL (ref 3.5–5.2)
ALBUMIN SERPL-MCNC: 4.6 G/DL (ref 3.5–5.2)
ALBUMIN/CREAT UR: 8 MG/G CREAT (ref 0–29)
ALBUMIN/GLOB SERPL: 1.7 G/DL
ALBUMIN/GLOB SERPL: 1.9 G/DL
ALP SERPL-CCNC: 71 U/L (ref 39–117)
ALP SERPL-CCNC: 85 U/L (ref 39–117)
ALT SERPL W P-5'-P-CCNC: 22 U/L (ref 1–33)
ALT SERPL-CCNC: 24 U/L (ref 1–33)
ANION GAP SERPL CALCULATED.3IONS-SCNC: 10.3 MMOL/L (ref 5–15)
AST SERPL-CCNC: 17 U/L (ref 1–32)
AST SERPL-CCNC: 19 U/L (ref 1–32)
BASOPHILS # BLD AUTO: 0.04 10*3/MM3 (ref 0–0.2)
BASOPHILS NFR BLD AUTO: 0.7 % (ref 0–1.5)
BILIRUB SERPL-MCNC: 0.3 MG/DL (ref 0–1.2)
BILIRUB SERPL-MCNC: 0.4 MG/DL (ref 0–1.2)
BILIRUB UR QL STRIP: NEGATIVE
BUN SERPL-MCNC: 12 MG/DL (ref 6–20)
BUN SERPL-MCNC: 12 MG/DL (ref 6–20)
BUN/CREAT SERPL: 15.2 (ref 7–25)
BUN/CREAT SERPL: 16.9 (ref 7–25)
C PEPTIDE SERPL-MCNC: 2.7 NG/ML (ref 1.1–4.4)
CALCIUM SERPL-MCNC: 9.8 MG/DL (ref 8.6–10.5)
CALCIUM SPEC-SCNC: 9.2 MG/DL (ref 8.6–10.5)
CHLORIDE SERPL-SCNC: 103 MMOL/L (ref 98–107)
CHLORIDE SERPL-SCNC: 106 MMOL/L (ref 98–107)
CHOLEST SERPL-MCNC: 267 MG/DL (ref 0–200)
CLARITY UR: CLEAR
CO2 SERPL-SCNC: 23.7 MMOL/L (ref 22–29)
CO2 SERPL-SCNC: 26.1 MMOL/L (ref 22–29)
COLOR UR: YELLOW
CREAT SERPL-MCNC: 0.71 MG/DL (ref 0.57–1)
CREAT SERPL-MCNC: 0.79 MG/DL (ref 0.57–1)
CREAT UR-MCNC: 44 MG/DL
DEPRECATED RDW RBC AUTO: 46.9 FL (ref 37–54)
EOSINOPHIL # BLD AUTO: 0.22 10*3/MM3 (ref 0–0.4)
EOSINOPHIL NFR BLD AUTO: 3.9 % (ref 0.3–6.2)
ERYTHROCYTE [DISTWIDTH] IN BLOOD BY AUTOMATED COUNT: 13.9 % (ref 12.3–15.4)
FOLATE SERPL-MCNC: 14.7 NG/ML (ref 4.78–24.2)
GFR SERPL CREATININE-BSD FRML MDRD: 87 ML/MIN/1.73
GLOBULIN SER CALC-MCNC: 2.4 GM/DL
GLOBULIN UR ELPH-MCNC: 2.5 GM/DL
GLUCOSE SERPL-MCNC: 104 MG/DL (ref 65–99)
GLUCOSE SERPL-MCNC: 112 MG/DL (ref 65–99)
GLUCOSE UR STRIP-MCNC: NEGATIVE MG/DL
HBA1C MFR BLD: 5.2 % (ref 4.8–5.6)
HCT VFR BLD AUTO: 37.1 % (ref 34–46.6)
HDLC SERPL-MCNC: 61 MG/DL (ref 40–60)
HGB BLD-MCNC: 13 G/DL (ref 12–15.9)
HGB UR QL STRIP.AUTO: NEGATIVE
HOLD SPECIMEN: NORMAL
IMM GRANULOCYTES # BLD AUTO: 0.01 10*3/MM3 (ref 0–0.05)
IMM GRANULOCYTES NFR BLD AUTO: 0.2 % (ref 0–0.5)
INR PPP: 0.96 (ref 0.9–1.1)
INTERPRETATION: NORMAL
KETONES UR QL STRIP: NEGATIVE
LDLC SERPL CALC-MCNC: 180 MG/DL (ref 0–100)
LEUKOCYTE ESTERASE UR QL STRIP.AUTO: NEGATIVE
LYMPHOCYTES # BLD AUTO: 2.14 10*3/MM3 (ref 0.7–3.1)
LYMPHOCYTES NFR BLD AUTO: 37.6 % (ref 19.6–45.3)
Lab: NORMAL
MCH RBC QN AUTO: 33 PG (ref 26.6–33)
MCHC RBC AUTO-ENTMCNC: 35 G/DL (ref 31.5–35.7)
MCV RBC AUTO: 94.2 FL (ref 79–97)
MICROALBUMIN UR-MCNC: 3.7 UG/ML
MONOCYTES # BLD AUTO: 0.46 10*3/MM3 (ref 0.1–0.9)
MONOCYTES NFR BLD AUTO: 8.1 % (ref 5–12)
NEUTROPHILS NFR BLD AUTO: 2.82 10*3/MM3 (ref 1.7–7)
NEUTROPHILS NFR BLD AUTO: 49.5 % (ref 42.7–76)
NITRITE UR QL STRIP: NEGATIVE
NRBC BLD AUTO-RTO: 0.2 /100 WBC (ref 0–0.2)
PH UR STRIP.AUTO: 7.5 [PH] (ref 5–8)
PLATELET # BLD AUTO: 286 10*3/MM3 (ref 140–450)
PMV BLD AUTO: 10.2 FL (ref 6–12)
POTASSIUM SERPL-SCNC: 3.8 MMOL/L (ref 3.5–5.2)
POTASSIUM SERPL-SCNC: 4.4 MMOL/L (ref 3.5–5.2)
PROT SERPL-MCNC: 6.8 G/DL (ref 6–8.5)
PROT SERPL-MCNC: 7 G/DL (ref 6–8.5)
PROT UR QL STRIP: NEGATIVE
PROTHROMBIN TIME: 12.6 SECONDS (ref 11.7–14.2)
QT INTERVAL: 390 MS
RBC # BLD AUTO: 3.94 10*6/MM3 (ref 3.77–5.28)
SODIUM SERPL-SCNC: 140 MMOL/L (ref 136–145)
SODIUM SERPL-SCNC: 140 MMOL/L (ref 136–145)
SP GR UR STRIP: <=1.005 (ref 1–1.03)
T3FREE SERPL-MCNC: 3.9 PG/ML (ref 2–4.4)
T4 FREE SERPL-MCNC: 1.29 NG/DL (ref 0.93–1.7)
T4 FREE SERPL-MCNC: 1.47 NG/DL (ref 0.93–1.7)
TRIGL SERPL-MCNC: 146 MG/DL (ref 0–150)
TROPONIN T SERPL-MCNC: <0.01 NG/ML (ref 0–0.03)
TSH SERPL DL<=0.005 MIU/L-ACNC: 1.01 UIU/ML (ref 0.27–4.2)
TSH SERPL DL<=0.05 MIU/L-ACNC: 1.02 UIU/ML (ref 0.27–4.2)
UROBILINOGEN UR QL STRIP: NORMAL
VIT B12 SERPL-MCNC: 722 PG/ML (ref 211–946)
VLDLC SERPL CALC-MCNC: 26 MG/DL (ref 5–40)
WBC # BLD AUTO: 5.69 10*3/MM3 (ref 3.4–10.8)
WHOLE BLOOD HOLD SPECIMEN: NORMAL
WHOLE BLOOD HOLD SPECIMEN: NORMAL

## 2021-01-06 PROCEDURE — 96374 THER/PROPH/DIAG INJ IV PUSH: CPT

## 2021-01-06 PROCEDURE — 84443 ASSAY THYROID STIM HORMONE: CPT | Performed by: NURSE PRACTITIONER

## 2021-01-06 PROCEDURE — 80053 COMPREHEN METABOLIC PANEL: CPT | Performed by: NURSE PRACTITIONER

## 2021-01-06 PROCEDURE — 96375 TX/PRO/DX INJ NEW DRUG ADDON: CPT

## 2021-01-06 PROCEDURE — 84484 ASSAY OF TROPONIN QUANT: CPT | Performed by: NURSE PRACTITIONER

## 2021-01-06 PROCEDURE — 25010000002 KETOROLAC TROMETHAMINE PER 15 MG: Performed by: NURSE PRACTITIONER

## 2021-01-06 PROCEDURE — 25010000002 DROPERIDOL PER 5 MG: Performed by: NURSE PRACTITIONER

## 2021-01-06 PROCEDURE — 99284 EMERGENCY DEPT VISIT MOD MDM: CPT

## 2021-01-06 PROCEDURE — 93005 ELECTROCARDIOGRAM TRACING: CPT | Performed by: NURSE PRACTITIONER

## 2021-01-06 PROCEDURE — 25010000002 DIPHENHYDRAMINE PER 50 MG: Performed by: NURSE PRACTITIONER

## 2021-01-06 PROCEDURE — 93010 ELECTROCARDIOGRAM REPORT: CPT | Performed by: INTERNAL MEDICINE

## 2021-01-06 PROCEDURE — 85610 PROTHROMBIN TIME: CPT | Performed by: NURSE PRACTITIONER

## 2021-01-06 PROCEDURE — 99215 OFFICE O/P EST HI 40 MIN: CPT | Performed by: NURSE PRACTITIONER

## 2021-01-06 PROCEDURE — 85025 COMPLETE CBC W/AUTO DIFF WBC: CPT | Performed by: NURSE PRACTITIONER

## 2021-01-06 PROCEDURE — 84439 ASSAY OF FREE THYROXINE: CPT | Performed by: NURSE PRACTITIONER

## 2021-01-06 PROCEDURE — 36415 COLL VENOUS BLD VENIPUNCTURE: CPT

## 2021-01-06 PROCEDURE — 70450 CT HEAD/BRAIN W/O DYE: CPT

## 2021-01-06 PROCEDURE — 71045 X-RAY EXAM CHEST 1 VIEW: CPT

## 2021-01-06 PROCEDURE — 81003 URINALYSIS AUTO W/O SCOPE: CPT | Performed by: NURSE PRACTITIONER

## 2021-01-06 RX ORDER — LISINOPRIL 10 MG/1
10 TABLET ORAL DAILY
Qty: 30 TABLET | Refills: 0 | Status: SHIPPED | OUTPATIENT
Start: 2021-01-06 | End: 2021-01-14

## 2021-01-06 RX ORDER — LABETALOL HYDROCHLORIDE 5 MG/ML
10 INJECTION, SOLUTION INTRAVENOUS ONCE
Status: COMPLETED | OUTPATIENT
Start: 2021-01-06 | End: 2021-01-06

## 2021-01-06 RX ORDER — ONDANSETRON 8 MG/1
8 TABLET, ORALLY DISINTEGRATING ORAL EVERY 6 HOURS PRN
Status: DISCONTINUED | OUTPATIENT
Start: 2021-01-06 | End: 2021-05-20

## 2021-01-06 RX ORDER — KETOROLAC TROMETHAMINE 15 MG/ML
15 INJECTION, SOLUTION INTRAMUSCULAR; INTRAVENOUS ONCE
Status: COMPLETED | OUTPATIENT
Start: 2021-01-06 | End: 2021-01-06

## 2021-01-06 RX ORDER — DIPHENHYDRAMINE HYDROCHLORIDE 50 MG/ML
12.5 INJECTION INTRAMUSCULAR; INTRAVENOUS ONCE
Status: COMPLETED | OUTPATIENT
Start: 2021-01-06 | End: 2021-01-06

## 2021-01-06 RX ORDER — DROPERIDOL 2.5 MG/ML
2.5 INJECTION, SOLUTION INTRAMUSCULAR; INTRAVENOUS ONCE
Status: COMPLETED | OUTPATIENT
Start: 2021-01-06 | End: 2021-01-06

## 2021-01-06 RX ORDER — SODIUM CHLORIDE 0.9 % (FLUSH) 0.9 %
10 SYRINGE (ML) INJECTION AS NEEDED
Status: DISCONTINUED | OUTPATIENT
Start: 2021-01-06 | End: 2021-01-06 | Stop reason: HOSPADM

## 2021-01-06 RX ADMIN — SODIUM CHLORIDE, PRESERVATIVE FREE 10 ML: 5 INJECTION INTRAVENOUS at 11:11

## 2021-01-06 RX ADMIN — LABETALOL HYDROCHLORIDE 10 MG: 5 INJECTION, SOLUTION INTRAVENOUS at 11:30

## 2021-01-06 RX ADMIN — DIPHENHYDRAMINE HYDROCHLORIDE 12.5 MG: 50 INJECTION, SOLUTION INTRAMUSCULAR; INTRAVENOUS at 11:15

## 2021-01-06 RX ADMIN — SODIUM CHLORIDE 1000 ML: 9 INJECTION, SOLUTION INTRAVENOUS at 11:11

## 2021-01-06 RX ADMIN — KETOROLAC TROMETHAMINE 15 MG: 15 INJECTION, SOLUTION INTRAMUSCULAR; INTRAVENOUS at 11:12

## 2021-01-06 RX ADMIN — DROPERIDOL 2.5 MG: 2.5 INJECTION, SOLUTION INTRAMUSCULAR; INTRAVENOUS at 11:14

## 2021-01-06 NOTE — DISCHARGE INSTRUCTIONS
It is important to be compliant with your blood pressure medication    Failure to do so will result in possible stroke    Although you are being discharged from the ED today, I encourage you to return for worsening symptoms. Things can, and do, change such that treatment at home with medication may not be adequate. Specifically I recommend returning for chest pain or discomfort, difficulty breathing, persistent vomiting or difficulty holding down liquids or medications, fever > 102.0 F, or any other worsening or alarming symptoms.     Rest. Drink plenty of fluids.  Follow up with PCP or provider listed for further evaluation and management.  Follow up with primary care provider for further management and to have blood pressure rechecked.  Take all medications as prescribed.

## 2021-01-06 NOTE — ED NOTES
Pt to [facility] from MD office for dizziness, tingling to hands and feet, generalized HA, + nausea. HTN states has been attempting to control with autoimmune diet.     Patient was placed in face mask in first look. Patient was wearing facemask when I entered the room and throughout our encounter. I wore full protective equipment throughout this patient encounter including a face mask, eye shield,  and gloves. Hand hygiene was performed before donning protective equipment and after removal when leaving the room.       Scarlett Gu, RN  01/06/21 [id number]       Scarlett Gu, RN  01/06/21 4781

## 2021-01-06 NOTE — ED PROVIDER NOTES
Pt presents to the ED complaining of headache described as pressure with lightheadedness weakness and intermittent confusion while at her PCPs office today with a markedly elevated blood pressure.  She states she was nauseated and given Zofran prior to arrival.  Of note is that the patient was admitted to the hospital last May with a markedly elevated blood pressure and difficulty with speech.  She was admitted and had a negative MRI of the brain, CTA of the head and CTA of the neck.  They felt her symptoms at that time was secondary to migraine and/or hypertensive crisis.  The patient was discharged on a baby aspirin, statin and lisinopril which she admits to not taking.    On exam, pt is A&Ox3. NAD  PERRL, moist mucous membranes.  Normocephalic and atraumatic  Heart is RRR. Lungs are CTAB.   Abd is soft, non tender, non distended, bowel sounds positive.   No pedal edema.  No calf tenderness.  Normal neuro exam      I agree with midlevel plan to treat the patient's blood pressure, headache and obtain labs and CT scan.    PPE  Pt does not present with symptoms for COVID19; however, I was wearing a mask and goggles throughout all patient interaction.    EKG    EKG time: 1140  Rhythm/Rate: Normal sinus rhythm at 85  No Acute Ischemia  Non-Specific ST-T changes    Unchanged compared to prior on 5/6/2020    Interpreted Contemporaneously by me.  Independently viewed by me      The patient's labs EKG and head CT unremarkable.  After treatment for her headache and blood pressure the patient now feels markedly better with a blood pressure of 147/103.  I advised the patient that she needed to take the medication she was prescribed on discharge from the hospital last May, specifically she must take her blood pressure medicine.  The patient states she understands and agrees with this plan    The NICOLASA and I have discussed this patient's history, physical exam, and treatment plan.  I have reviewed the documentation and personally  had a face to face interaction with the patient. I affirm the documentation and agree with the treatment and plan.  The attached note describes my personal findings.           Zach Ambriz MD  01/06/21 2331

## 2021-01-06 NOTE — ED TRIAGE NOTES
Pt picked up at [facility] by EMS. Pt seen there for joint pain and bloated feeling. While at the doctor pt developed a headache, vomiting and confusion. Pt was given zofran at the doctor's office. Pt currently alert & oriented and reports headache 8/10.     Pt arrived to ER wearing a cloth face mask.

## 2021-01-06 NOTE — PROGRESS NOTES
"Chief Complaint  pain legs/hands (hashimotos) and fatigue/wt gain    Subjective          Macie Sarabia presents to [facility] for   History of Present Illness  C/o joint pain, weight gain, fatigue, with hashimoto's, she saw endocrine yesterday, pending labs, taking tirosint 75mcg daily. She states she has gained 10 lbs with worsening joint pain, denies changes in diet and exercise, she states she had worsening joint pain 2 days ago that she considered going to ER for pain. She states she has pain in feet. States weight gain steadily increasing, usually 140s, states she thinks she gained weight from 146 last week to 150 today. She states she feels confused currently, unable saw what she wants to say. She does have abd pain and bloating since weight gain a couple of days ago. Last BM this morning, she has had constipation and tool miralax OTC. Denies any missed thyroid medications. Denies any known covid exposures.   she she states she feels lightheaded this morning during exam, she did not eat yet today. She states dizziness started this morning, she denies syncope, she denies ear pain or HA, she denies CP, SOA. She does check BP at home, taking lisinopril 10mg daily. She does not check BS at home. States she does have hx of low BS when she was younger.     Objective   Vital Signs:   /98   Pulse 102   Temp 98.2 °F (36.8 °C)   Ht 157.5 cm (62\")   Wt 69.5 kg (153 lb 3.2 oz)   SpO2 98%   BMI 28.02 kg/m²     Physical Exam  Vitals signs and nursing note reviewed.   Constitutional:       General: She is in acute distress.      Appearance: She is well-developed. She is ill-appearing.   HENT:      Head: Normocephalic.   Eyes:      Pupils: Pupils are equal, round, and reactive to light.   Cardiovascular:      Rate and Rhythm: Normal rate and regular rhythm.      Pulses:           Radial pulses are 2+ on the right side and 2+ on the left side.        Dorsalis pedis pulses " are 2+ on the right side and 2+ on the left side.        Posterior tibial pulses are 2+ on the right side and 2+ on the left side.      Heart sounds: Normal heart sounds.   Pulmonary:      Effort: Pulmonary effort is normal.      Breath sounds: Normal breath sounds.   Abdominal:      General: Abdomen is flat.      Palpations: Abdomen is soft.      Tenderness: There is no abdominal tenderness.      Comments: No distension noted, patient became very nauseated during exam, dry heaving. zofran given.    Skin:     General: Skin is warm and dry.   Neurological:      Mental Status: She is alert and oriented to person, place, and time.      Sensory: Sensation is intact.      Coordination: Coordination abnormal.      Gait: Gait normal.      Comments: Slow to address commands during neuro exam. She is oriented to person place and time, but hesitant during exam to answer questions. No speech changes noted. No facial droop or drift noted.    Psychiatric:         Behavior: Behavior normal.         Judgment: Judgment normal.      patient alerted and oriented at the start of the visit today, about 4-5 minutes into taking history, patient stated she became dizzy, started this morning, worsening during visit today, she stated she felt confused but was able to speak appropriately, she did have slow reaction to commands during neurological exam. She then became nauseated, dry heaving, sensitive to light. EMS was called, BS in room 109. She remained responsive but appeared distressed.      Result Review :     TSH    TSH 2/7/20 4/28/20 5/6/20   TSH 4.440 (A) 2.420 5.720 (A)   (A) Abnormal value       Comments are available for some flowsheets but are not being displayed.                    BS in room 109.       Assessment and Plan    Problem List Items Addressed This Visit        Endocrine and Metabolic    Hypothyroidism due to Hashimoto's thyroiditis - Primary    Relevant Orders    JORGE L    C-reactive Protein    Rheumatoid Factor     Sedimentation Rate    Uric Acid       Symptoms and Signs    Near syncope    Relevant Medications    ondansetron ODT (ZOFRAN-ODT) disintegrating tablet 8 mg    Other Relevant Orders    JORGE L    C-reactive Protein    Rheumatoid Factor    Sedimentation Rate    Uric Acid    Chronic fatigue    Relevant Orders    JORGE L    C-reactive Protein    Rheumatoid Factor    Sedimentation Rate    Uric Acid      Other Visit Diagnoses     Arthralgia, unspecified joint        Relevant Orders    JORGE L    C-reactive Protein    Rheumatoid Factor    Sedimentation Rate    Uric Acid    Dizziness        Relevant Medications    ondansetron ODT (ZOFRAN-ODT) disintegrating tablet 8 mg    Other Relevant Orders    JORGE L    C-reactive Protein    Rheumatoid Factor    Sedimentation Rate    Uric Acid    Nausea        Relevant Medications    ondansetron ODT (ZOFRAN-ODT) disintegrating tablet 8 mg    Other Relevant Orders    JORGE L    C-reactive Protein    Rheumatoid Factor    Sedimentation Rate    Uric Acid    Confusion        Relevant Medications    ondansetron ODT (ZOFRAN-ODT) disintegrating tablet 8 mg    Other Relevant Orders    JORGE L    C-reactive Protein    Rheumatoid Factor    Sedimentation Rate    Uric Acid        I spent 40 minutes caring for Macie on this date of service. This time includes time spent by me in the following activities:preparing for the visit, reviewing tests, obtaining and/or reviewing a separately obtained history, performing a medically appropriate examination and/or evaluation , counseling and educating the patient/family/caregiver, ordering medications, tests, or procedures, documenting information in the medical record and care coordination  Follow Up   Return if symptoms worsen or fail to improve.  Patient was given instructions and counseling regarding her condition or for health maintenance advice. Please see specific information pulled into the AVS if appropriate.     Patient sent to hospital via EMS ambulance, her son was  informed of hospital transportation. Report given to EMS, she was transported to hospital upon leaving office per EMS.   Will order arthritis panel for lab next week, cont f/u with endo.   If any worsening symptoms, dizziness, nausea, pain advise ER.   No lab or xray available in office today.   Patient agrees with plan of care and understands instructions. Call if worsening symptoms or any problems or concerns.

## 2021-01-06 NOTE — PATIENT INSTRUCTIONS
Patient sent to hospital via EMS ambulance, her son was informed of hospital transportation. Report given to EMS, she was transported to hospital upon leaving office per EMS.   Will order arthritis panel for lab next week, cont f/u with endo.   If any worsening symptoms, dizziness, nausea, pain advise ER.   Patient agrees with plan of care and understands instructions. Call if worsening symptoms or any problems or concerns.

## 2021-01-06 NOTE — ED PROVIDER NOTES
"EMERGENCY DEPARTMENT ENCOUNTER    Room Number:  20/20  Date seen:  1/6/2021  Time seen: 10:14 EST  PCP: Minna Roberto APRN  Historian: patient, PCP office visit notes    HPI:  Chief complaint:lightheaded, headache  A complete HPI/ROS/PMH/PSH/SH/FH are unobtainable due to: n/a  Context:Macie Sarabia is a 50 y.o. female who presents to the ED with c/o acute onset of headache described as pressure, lightheadedness, feeling off balance, weakness and intermittent confusion (resolved) while at her PCP office today.  She endorses also, nausea \"worse with any movement\", better with being still and blurry vision with photophobia.  Symptoms made a bit better by laying still and receiving a dose of Zofran PTA.  She was seeing her PCP due to joint/muscle pain and has h/o thyroid problems. She denies any chest pain, shortness of breath, diarrhea or unilateral weakness. She states she is not prone to headaches. She has been watching her BP and treating it with diet.      Patient was placed in face mask in first look. Patient was wearing facemask when I entered the room and throughout our encounter. I wore full protective equipment throughout this patient encounter including a face mask, eye shield and gloves. Hand hygiene/washing of hands was performed before donning protective equipment and after removal when leaving the room.    MEDICAL RECORD REVIEW    Ms. Sarabia is a 49 y.o. female with a history of Hashimoto's thyroiditis, GERD, HTN, HLD who presented to [facility] initially complaining of syncopal episode described as episode of feeling lightheaded and having difficulty speaking. She also reported associated occipital headache described as pressure and heavy feeling in arms and legs with numbness in hands and feet.  Please see the admitting history and physical for further details.  She was admitted to the hospital for further evaluation and treatment of potential TIA vs CVA. BP was elevated on " admission. Symptoms have nearly completely resolved. She continues to c/o mild occipital HA today. Speech has returned to normal. CTH was negative. Additional imaging included MRI of brain reviewed by Neurology shows minimal chroniic microvascular changes.  CTA head and neck were unremarkable. Neurology relates symptoms to HTN urgency, which has resolved,  as TIA vs atypical migraine. Recommended to continue ASA 81 mg and statin at discharge. She will have Zio patch placed. Goal BP < 130/80. F/U Neurology in 3 months. As mentioned in H&P, pt is followed by Endocrinology for hypothyroidism and has future appointment for sleep study. Medically, she is stable for discharge.       ALLERGIES  Patient has no known allergies.    PAST MEDICAL HISTORY  Active Ambulatory Problems     Diagnosis Date Noted   • Menorrhagia with regular cycle 11/11/2019   • Mixed dyslipidemia 02/07/2020   • Vitamin D deficiency 04/24/2020   • Essential hypertension 04/24/2020   • Hypothyroidism due to Hashimoto's thyroiditis 04/24/2020   • Symptomatic menopausal or female climacteric states 04/24/2020   • Expressive aphasia 05/06/2020   • Near syncope 05/06/2020   • Chronic fatigue 05/06/2020     Resolved Ambulatory Problems     Diagnosis Date Noted   • No Resolved Ambulatory Problems     Past Medical History:   Diagnosis Date   • Abnormal menses    • Anemia    • Dizziness    • Fibroids    • GERD (gastroesophageal reflux disease)    • Hashimoto's disease    • Headache    • Hyperlipidemia    • Hypothyroidism        PAST SURGICAL HISTORY  Past Surgical History:   Procedure Laterality Date   • COLONOSCOPY N/A 1/31/2020    Procedure: COLONOSCOPY into cecum/terminal ileum;  Surgeon: Scooter Gross MD;  Location: [facility] ENDOSCOPY;  Service: Gastroenterology   • ELBOW PROCEDURE Left     AS CHILD   • ENDOSCOPY     • ENDOSCOPY N/A 1/31/2020    Procedure: ESOPHAGOGASTRODUODENOSCOPY with biopsy;  Surgeon: Scooter Gross MD;  Location: [facility] ENDOSCOPY;   Service: Gastroenterology   • LAPAROSCOPY WITH LASER      EXPLORATORY   • TOTAL LAPAROSCOPIC HYSTERECTOMY N/A 11/11/2019    Procedure: TOTAL LAPAROSCOPIC HYSTERECTOMY BILATERAL SALPINGECTOMY;  Surgeon: Amanda Santiago MD;  Location: [facility] OR;  Service: Gynecology       FAMILY HISTORY  Family History   Adopted: Yes   Problem Relation Age of Onset   • Diabetes Child    • Thyroid disease Child    • Malig Hyperthermia Neg Hx        SOCIAL HISTORY  Social History     Socioeconomic History   • Marital status: Single     Spouse name: Not on file   • Number of children: Not on file   • Years of education: Not on file   • Highest education level: Not on file   Tobacco Use   • Smoking status: Never Smoker   • Smokeless tobacco: Never Used   Substance and Sexual Activity   • Alcohol use: Yes     Comment: SOCIALLY   • Drug use: No   • Sexual activity: Defer       REVIEW OF SYSTEMS  Review of Systems    All systems reviewed and negative except for those discussed in HPI.     PHYSICAL EXAM    ED Triage Vitals [01/06/21 1005]   Temp Heart Rate Resp BP SpO2   98.3 °F (36.8 °C) 86 18 (!) 170/105 100 %      Temp src Heart Rate Source Patient Position BP Location FiO2 (%)   -- -- -- -- --     Physical Exam    I have reviewed the triage vital signs and nursing notes.      GENERAL: not distressed  HENT: nares patent, mm moist  EYES: no scleral icterus, PERRL, EOMI, no nystagmus  NECK: no ROM limitations  CV: regular rhythm, regular rate, no murmur, no rubs, no gallups  RESPIRATORY: normal effort, CTAB  ABDOMEN: soft  : deferred  MUSCULOSKELETAL: no deformity, no weaknesses on extremities  NEURO: alert, moves all extremities, follows commands  SKIN: warm, dry    LAB RESULTS  Recent Results (from the past 24 hour(s))   Light Blue Top    Collection Time: 01/06/21 10:20 AM   Result Value Ref Range    Extra Tube hold for add-on    Green Top (Gel)    Collection Time: 01/06/21 10:20 AM   Result Value Ref Range    Extra Tube Hold  for add-ons.    Lavender Top    Collection Time: 01/06/21 10:20 AM   Result Value Ref Range    Extra Tube hold for add-on    Comprehensive Metabolic Panel    Collection Time: 01/06/21 10:20 AM    Specimen: Blood   Result Value Ref Range    Glucose 104 (H) 65 - 99 mg/dL    BUN 12 6 - 20 mg/dL    Creatinine 0.71 0.57 - 1.00 mg/dL    Sodium 140 136 - 145 mmol/L    Potassium 3.8 3.5 - 5.2 mmol/L    Chloride 106 98 - 107 mmol/L    CO2 23.7 22.0 - 29.0 mmol/L    Calcium 9.2 8.6 - 10.5 mg/dL    Total Protein 6.8 6.0 - 8.5 g/dL    Albumin 4.30 3.50 - 5.20 g/dL    ALT (SGPT) 22 1 - 33 U/L    AST (SGOT) 19 1 - 32 U/L    Alkaline Phosphatase 71 39 - 117 U/L    Total Bilirubin 0.4 0.0 - 1.2 mg/dL    eGFR Non African Amer 87 >60 mL/min/1.73    Globulin 2.5 gm/dL    A/G Ratio 1.7 g/dL    BUN/Creatinine Ratio 16.9 7.0 - 25.0    Anion Gap 10.3 5.0 - 15.0 mmol/L   Protime-INR    Collection Time: 01/06/21 10:20 AM    Specimen: Blood   Result Value Ref Range    Protime 12.6 11.7 - 14.2 Seconds    INR 0.96 0.90 - 1.10   TSH    Collection Time: 01/06/21 10:20 AM    Specimen: Blood   Result Value Ref Range    TSH 1.020 0.270 - 4.200 uIU/mL   T4, Free    Collection Time: 01/06/21 10:20 AM    Specimen: Blood   Result Value Ref Range    Free T4 1.47 0.93 - 1.70 ng/dL   CBC Auto Differential    Collection Time: 01/06/21 10:20 AM    Specimen: Blood   Result Value Ref Range    WBC 5.69 3.40 - 10.80 10*3/mm3    RBC 3.94 3.77 - 5.28 10*6/mm3    Hemoglobin 13.0 12.0 - 15.9 g/dL    Hematocrit 37.1 34.0 - 46.6 %    MCV 94.2 79.0 - 97.0 fL    MCH 33.0 26.6 - 33.0 pg    MCHC 35.0 31.5 - 35.7 g/dL    RDW 13.9 12.3 - 15.4 %    RDW-SD 46.9 37.0 - 54.0 fl    MPV 10.2 6.0 - 12.0 fL    Platelets 286 140 - 450 10*3/mm3    Neutrophil % 49.5 42.7 - 76.0 %    Lymphocyte % 37.6 19.6 - 45.3 %    Monocyte % 8.1 5.0 - 12.0 %    Eosinophil % 3.9 0.3 - 6.2 %    Basophil % 0.7 0.0 - 1.5 %    Immature Grans % 0.2 0.0 - 0.5 %    Neutrophils, Absolute 2.82 1.70 - 7.00  10*3/mm3    Lymphocytes, Absolute 2.14 0.70 - 3.10 10*3/mm3    Monocytes, Absolute 0.46 0.10 - 0.90 10*3/mm3    Eosinophils, Absolute 0.22 0.00 - 0.40 10*3/mm3    Basophils, Absolute 0.04 0.00 - 0.20 10*3/mm3    Immature Grans, Absolute 0.01 0.00 - 0.05 10*3/mm3    nRBC 0.2 0.0 - 0.2 /100 WBC   Troponin    Collection Time: 01/06/21 10:20 AM    Specimen: Blood   Result Value Ref Range    Troponin T <0.010 0.000 - 0.030 ng/mL   Urinalysis With Microscopic If Indicated (No Culture) - Urine, Clean Catch    Collection Time: 01/06/21 11:16 AM    Specimen: Urine, Clean Catch   Result Value Ref Range    Color, UA Yellow Yellow, Straw    Appearance, UA Clear Clear    pH, UA 7.5 5.0 - 8.0    Specific Gravity, UA <=1.005 1.005 - 1.030    Glucose, UA Negative Negative    Ketones, UA Negative Negative    Bilirubin, UA Negative Negative    Blood, UA Negative Negative    Protein, UA Negative Negative    Leuk Esterase, UA Negative Negative    Nitrite, UA Negative Negative    Urobilinogen, UA 0.2 E.U./dL 0.2 - 1.0 E.U./dL   ECG 12 Lead    Collection Time: 01/06/21 11:40 AM   Result Value Ref Range    QT Interval 390 ms         RADIOLOGY RESULTS  XR Chest 1 View   Final Result   Negative chest x-ray. No evidence of cardiomegaly.       This report was finalized on 1/6/2021 12:28 PM by Dr. Jorge Crowley M.D.          CT Head Without Contrast   Final Result            PROGRESS, DATA ANALYSIS, CONSULTS AND MEDICAL DECISION MAKING  All labs have been independently reviewed by me.  All radiology studies have been reviewed by me and discussed with radiologist dictating the report.  EKG's independently viewed and interpreted by me unless stated otherwise. Discussion below represents my analysis of pertinent findings related to patient's condition, differential diagnosis, treatment plan and final disposition.     ED Course as of Jan 06 1350   Wed Jan 06, 2021   1055 Discussed CT brain with Dr. Merrill, Radiologist.  No acute ICH, does  "have some parotid calcifications which can be seen in patients with autoimmune disorders.     [EW]   1100 I assisted patient to and from bathroom, she was mostly steady with her gait.  Her main complaint is \"weakness\"    [EW]   1207 Pt states she is feeling better, her BP is now 147/103 after Labetalol.  See medical record review for prior admission.     [EW]   1235 Pt looking a lot better.  We have all discussed the importance of starting antihypertensive with her.     [EW]   1349 EKG          EKG time: 1140  Rhythm/Rate: 85, sinus rhythm, irregular tracing  P waves and KY: normal KY, normal CHELLY  QRS, axis: normal qRS, normal axis  ST and T waves: borderline t wave abnormalities, anterior leads    Interpreted Contemporaneously by me, independently viewed  Rate/rhythm similar to prior dated 5/6/2020      [EW]      ED Course User Index  [EW] Kaye Stevens APRN     DDX: headache, hypertension, migaine headache    MDM: Pt had no neurological issues or confusion while she was here in the emergency room today.  She did not have any unilateral weakness or other concerns for stroke.  She was very hypertensive which I was able to get improved with a dose of labetalol and I feel that her hypertensive crisis is what led to the weakness and confusion that she experienced today in her PCP office.  The nurse, Dr. Ambriz and I all discussed strongly with patient the need to take blood pressure medications.  We discussed the risk of not taking blood pressure medications and she could have a stroke which does not resolve.     Reviewed pt's history and workup with Dr. Ambriz.  After a bedside evaluation, Dr. Ambriz agrees with the plan of care.    The patient's history, physical exam, and lab findings were discussed with the physician, who also performed a face to face history and physical exam.  I discussed all results and noted any abnormalities with patient.  Discussed absoute need to recheck abnormalities with their family " "physician.  I answered any of the patient's questions.  Discussed plan for discharge, as there is no emergent indication for admission.  Pt is agreeable and understands need for follow up and repeat testing.  Pt is aware that discharge does not mean that nothing is wrong but it indicates no emergency is present and they must continue care with their family physician.  Pt is discharged with instructions to follow up with primary care doctor to have their blood pressure rechecked.       Disposition vitals:  BP (!) 147/102   Pulse 82   Temp 98.3 °F (36.8 °C)   Resp 18   Ht 157.5 cm (62\")   Wt 69.9 kg (154 lb)   LMP 10/28/2019   SpO2 98%   BMI 28.17 kg/m²       DIAGNOSIS  Final diagnoses:   Hypertensive crisis without congestive heart failure       FOLLOW UP   Minna Roberto, APRN  [address] [facility] [id number] [phone]    Schedule an appointment as soon as possible for a visit in 2 days           Kaye Stevens, APRN  01/06/21 1352    "

## 2021-01-07 DIAGNOSIS — E78.2 MIXED DYSLIPIDEMIA: Primary | ICD-10-CM

## 2021-01-07 RX ORDER — ROSUVASTATIN CALCIUM 20 MG/1
20 TABLET, COATED ORAL NIGHTLY
Qty: 30 TABLET | Refills: 5 | Status: SHIPPED | OUTPATIENT
Start: 2021-01-07 | End: 2021-07-06

## 2021-01-14 ENCOUNTER — OFFICE VISIT (OUTPATIENT)
Dept: FAMILY MEDICINE CLINIC | Facility: CLINIC | Age: 51
End: 2021-01-14

## 2021-01-14 VITALS
DIASTOLIC BLOOD PRESSURE: 90 MMHG | TEMPERATURE: 97.5 F | SYSTOLIC BLOOD PRESSURE: 148 MMHG | HEART RATE: 88 BPM | HEIGHT: 62 IN | BODY MASS INDEX: 27.97 KG/M2 | WEIGHT: 152 LBS | OXYGEN SATURATION: 99 %

## 2021-01-14 DIAGNOSIS — R11.0 NAUSEA: ICD-10-CM

## 2021-01-14 DIAGNOSIS — M25.50 ARTHRALGIA, UNSPECIFIED JOINT: ICD-10-CM

## 2021-01-14 DIAGNOSIS — R55 NEAR SYNCOPE: ICD-10-CM

## 2021-01-14 DIAGNOSIS — R42 DIZZINESS: ICD-10-CM

## 2021-01-14 DIAGNOSIS — R53.83 FATIGUE, UNSPECIFIED TYPE: ICD-10-CM

## 2021-01-14 DIAGNOSIS — R53.82 CHRONIC FATIGUE: ICD-10-CM

## 2021-01-14 DIAGNOSIS — R41.0 CONFUSION: ICD-10-CM

## 2021-01-14 DIAGNOSIS — E06.3 HYPOTHYROIDISM DUE TO HASHIMOTO'S THYROIDITIS: ICD-10-CM

## 2021-01-14 DIAGNOSIS — I10 ESSENTIAL HYPERTENSION: Primary | ICD-10-CM

## 2021-01-14 DIAGNOSIS — E03.8 HYPOTHYROIDISM DUE TO HASHIMOTO'S THYROIDITIS: ICD-10-CM

## 2021-01-14 LAB
CHROMATIN AB SERPL-ACNC: <10 IU/ML (ref 0–14)
CRP SERPL-MCNC: 0.22 MG/DL (ref 0–0.5)
ERYTHROCYTE [SEDIMENTATION RATE] IN BLOOD: 12 MM/HR (ref 0–30)
URATE SERPL-MCNC: 7.2 MG/DL (ref 2.4–5.7)

## 2021-01-14 PROCEDURE — 36415 COLL VENOUS BLD VENIPUNCTURE: CPT | Performed by: NURSE PRACTITIONER

## 2021-01-14 PROCEDURE — 84550 ASSAY OF BLOOD/URIC ACID: CPT | Performed by: NURSE PRACTITIONER

## 2021-01-14 PROCEDURE — 99213 OFFICE O/P EST LOW 20 MIN: CPT | Performed by: NURSE PRACTITIONER

## 2021-01-14 PROCEDURE — 86431 RHEUMATOID FACTOR QUANT: CPT | Performed by: NURSE PRACTITIONER

## 2021-01-14 PROCEDURE — 85652 RBC SED RATE AUTOMATED: CPT | Performed by: NURSE PRACTITIONER

## 2021-01-14 PROCEDURE — 86140 C-REACTIVE PROTEIN: CPT | Performed by: NURSE PRACTITIONER

## 2021-01-14 RX ORDER — LISINOPRIL 20 MG/1
20 TABLET ORAL DAILY
Qty: 30 TABLET | Refills: 3 | Status: SHIPPED | OUTPATIENT
Start: 2021-01-14 | End: 2021-04-19

## 2021-01-14 NOTE — PATIENT INSTRUCTIONS
"DASH Eating Plan  DASH stands for \"Dietary Approaches to Stop Hypertension.\" The DASH eating plan is a healthy eating plan that has been shown to reduce high blood pressure (hypertension). It may also reduce your risk for type 2 diabetes, heart disease, and stroke. The DASH eating plan may also help with weight loss.  What are tips for following this plan?    General guidelines  · Avoid eating more than 2,300 mg (milligrams) of salt (sodium) a day. If you have hypertension, you may need to reduce your sodium intake to 1,500 mg a day.  · Limit alcohol intake to no more than 1 drink a day for nonpregnant women and 2 drinks a day for men. One drink equals 12 oz of beer, 5 oz of wine, or 1½ oz of hard liquor.  · Work with your health care provider to maintain a healthy body weight or to lose weight. Ask what an ideal weight is for you.  · Get at least 30 minutes of exercise that causes your heart to beat faster (aerobic exercise) most days of the week. Activities may include walking, swimming, or biking.  · Work with your health care provider or diet and nutrition specialist (dietitian) to adjust your eating plan to your individual calorie needs.  Reading food labels    · Check food labels for the amount of sodium per serving. Choose foods with less than 5 percent of the Daily Value of sodium. Generally, foods with less than 300 mg of sodium per serving fit into this eating plan.  · To find whole grains, look for the word \"whole\" as the first word in the ingredient list.  Shopping  · Buy products labeled as \"low-sodium\" or \"no salt added.\"  · Buy fresh foods. Avoid canned foods and premade or frozen meals.  Cooking  · Avoid adding salt when cooking. Use salt-free seasonings or herbs instead of table salt or sea salt. Check with your health care provider or pharmacist before using salt substitutes.  · Do not dimas foods. Cook foods using healthy methods such as baking, boiling, grilling, and broiling instead.  · Cook with " heart-healthy oils, such as olive, canola, soybean, or sunflower oil.  Meal planning  · Eat a balanced diet that includes:  ? 5 or more servings of fruits and vegetables each day. At each meal, try to fill half of your plate with fruits and vegetables.  ? Up to 6-8 servings of whole grains each day.  ? Less than 6 oz of lean meat, poultry, or fish each day. A 3-oz serving of meat is about the same size as a deck of cards. One egg equals 1 oz.  ? 2 servings of low-fat dairy each day.  ? A serving of nuts, seeds, or beans 5 times each week.  ? Heart-healthy fats. Healthy fats called Omega-3 fatty acids are found in foods such as flaxseeds and coldwater fish, like sardines, salmon, and mackerel.  · Limit how much you eat of the following:  ? Canned or prepackaged foods.  ? Food that is high in trans fat, such as fried foods.  ? Food that is high in saturated fat, such as fatty meat.  ? Sweets, desserts, sugary drinks, and other foods with added sugar.  ? Full-fat dairy products.  · Do not salt foods before eating.  · Try to eat at least 2 vegetarian meals each week.  · Eat more home-cooked food and less restaurant, buffet, and fast food.  · When eating at a restaurant, ask that your food be prepared with less salt or no salt, if possible.  What foods are recommended?  The items listed may not be a complete list. Talk with your dietitian about what dietary choices are best for you.  Grains  Whole-grain or whole-wheat bread. Whole-grain or whole-wheat pasta. Brown rice. Oatmeal. Quinoa. Bulgur. Whole-grain and low-sodium cereals. Vivian bread. Low-fat, low-sodium crackers. Whole-wheat flour tortillas.  Vegetables  Fresh or frozen vegetables (raw, steamed, roasted, or grilled). Low-sodium or reduced-sodium tomato and vegetable juice. Low-sodium or reduced-sodium tomato sauce and tomato paste. Low-sodium or reduced-sodium canned vegetables.  Fruits  All fresh, dried, or frozen fruit. Canned fruit in natural juice (without  added sugar).  Meat and other protein foods  Skinless chicken or turkey. Ground chicken or turkey. Pork with fat trimmed off. Fish and seafood. Egg whites. Dried beans, peas, or lentils. Unsalted nuts, nut butters, and seeds. Unsalted canned beans. Lean cuts of beef with fat trimmed off. Low-sodium, lean deli meat.  Dairy  Low-fat (1%) or fat-free (skim) milk. Fat-free, low-fat, or reduced-fat cheeses. Nonfat, low-sodium ricotta or cottage cheese. Low-fat or nonfat yogurt. Low-fat, low-sodium cheese.  Fats and oils  Soft margarine without trans fats. Vegetable oil. Low-fat, reduced-fat, or light mayonnaise and salad dressings (reduced-sodium). Canola, safflower, olive, soybean, and sunflower oils. Avocado.  Seasoning and other foods  Herbs. Spices. Seasoning mixes without salt. Unsalted popcorn and pretzels. Fat-free sweets.  What foods are not recommended?  The items listed may not be a complete list. Talk with your dietitian about what dietary choices are best for you.  Grains  Baked goods made with fat, such as croissants, muffins, or some breads. Dry pasta or rice meal packs.  Vegetables  Creamed or fried vegetables. Vegetables in a cheese sauce. Regular canned vegetables (not low-sodium or reduced-sodium). Regular canned tomato sauce and paste (not low-sodium or reduced-sodium). Regular tomato and vegetable juice (not low-sodium or reduced-sodium). Pickles. Olives.  Fruits  Canned fruit in a light or heavy syrup. Fried fruit. Fruit in cream or butter sauce.  Meat and other protein foods  Fatty cuts of meat. Ribs. Fried meat. Rodriguez. Sausage. Bologna and other processed lunch meats. Salami. Fatback. Hotdogs. Bratwurst. Salted nuts and seeds. Canned beans with added salt. Canned or smoked fish. Whole eggs or egg yolks. Chicken or turkey with skin.  Dairy  Whole or 2% milk, cream, and half-and-half. Whole or full-fat cream cheese. Whole-fat or sweetened yogurt. Full-fat cheese. Nondairy creamers. Whipped toppings.  Processed cheese and cheese spreads.  Fats and oils  Butter. Stick margarine. Lard. Shortening. Ghee. Rodriguez fat. Tropical oils, such as coconut, palm kernel, or palm oil.  Seasoning and other foods  Salted popcorn and pretzels. Onion salt, garlic salt, seasoned salt, table salt, and sea salt. Worcestershire sauce. Tartar sauce. Barbecue sauce. Teriyaki sauce. Soy sauce, including reduced-sodium. Steak sauce. Canned and packaged gravies. Fish sauce. Oyster sauce. Cocktail sauce. Horseradish that you find on the shelf. Ketchup. Mustard. Meat flavorings and tenderizers. Bouillon cubes. Hot sauce and Tabasco sauce. Premade or packaged marinades. Premade or packaged taco seasonings. Relishes. Regular salad dressings.  Where to find more information:  · National Heart, Lung, and Blood [place]: www.nhlbi.nih.gov  · American Heart Association: www.heart.org  Summary  · The DASH eating plan is a healthy eating plan that has been shown to reduce high blood pressure (hypertension). It may also reduce your risk for type 2 diabetes, heart disease, and stroke.  · With the DASH eating plan, you should limit salt (sodium) intake to 2,300 mg a day. If you have hypertension, you may need to reduce your sodium intake to 1,500 mg a day.  · When on the DASH eating plan, aim to eat more fresh fruits and vegetables, whole grains, lean proteins, low-fat dairy, and heart-healthy fats.  · Work with your health care provider or diet and nutrition specialist (dietitian) to adjust your eating plan to your individual calorie needs.  This information is not intended to replace advice given to you by your health care provider. Make sure you discuss any questions you have with your health care provider.  Document Revised: 11/30/2018 Document Reviewed: 12/11/2017  [employer] Patient Education © 2020 [employer]vier Inc.      Labs today will call with results.   Increase lisinopril to 20mg daily, monitor BP at home, call with elevated readings.   F/u in  about 1-2 months for recheck.   Patient agrees with plan of care and understands instructions. Call if worsening symptoms or any problems or concerns.

## 2021-01-14 NOTE — PROGRESS NOTES
"Chief Complaint  Weight Gain, Hypertension, and Pain    Subjective          Macie Sarabia presents to [facility] for   History of Present Illness  C/o weight gain, joint pain, she was seen in office 1/6/2021, sent to ER via EMS for dizziness, nausea in room, increased confusion, she went to [facility] ER, please refer to H&P and d/c allyson for details. She was d/c home with lisinopril 10mg daily. CT head showed no acute findings, did show multiple small calcifications scattered in the right parotid gland, nonspecific but possibly associated with autoimmune disease or chronic kidney disease. She has pending autoimmune labs. Labs from ER WNL. cxr NAD. States she is feeling much better, denies any recent dizziness, still does have HA, taking Excedrin. She does check BP at home usually 140s-160s/100s denies CP, SOA, unsure of leg swelling. She states she gained about 6 lbs from week prior to visit. She had echo 5/2020 normal holter, echo showed grade 1 diastolic dysfunction with EF75%, does not see cardiology.       Objective   Vital Signs:   /90 (BP Location: Left arm, Patient Position: Sitting, Cuff Size: Adult)   Pulse 88   Temp 97.5 °F (36.4 °C) (Temporal)   Ht 157.5 cm (62\")   Wt 68.9 kg (152 lb)   SpO2 99%   BMI 27.80 kg/m²     Physical Exam  Vitals signs and nursing note reviewed.   Constitutional:       Appearance: She is well-developed.   HENT:      Head: Normocephalic.   Eyes:      Pupils: Pupils are equal, round, and reactive to light.   Cardiovascular:      Rate and Rhythm: Normal rate and regular rhythm.      Heart sounds: Normal heart sounds.   Pulmonary:      Effort: Pulmonary effort is normal.      Breath sounds: Normal breath sounds.   Skin:     General: Skin is warm and dry.   Neurological:      Mental Status: She is alert and oriented to person, place, and time.   Psychiatric:         Behavior: Behavior normal.         Judgment: Judgment normal. "        Result Review :                 Assessment and Plan    Problem List Items Addressed This Visit        Cardiac and Vasculature    Essential hypertension - Primary    Relevant Medications    lisinopril (PRINIVIL,ZESTRIL) 20 MG tablet    Other Relevant Orders    Miscellaneous DME       Endocrine and Metabolic    Hypothyroidism due to Hashimoto's thyroiditis       Symptoms and Signs    Near syncope    Chronic fatigue      Other Visit Diagnoses     Arthralgia, unspecified joint        Fatigue, unspecified type        Dizziness        Nausea        Confusion              Follow Up   Return in about 2 months (around 3/14/2021), or if symptoms worsen or fail to improve, for Recheck.  Patient was given instructions and counseling regarding her condition or for health maintenance advice. Please see specific information pulled into the AVS if appropriate.       Answers for HPI/ROS submitted by the patient on 1/9/2021   What is the primary reason for your visit?: Other  Please describe your symptoms.: Pain radiating from feet , legs, hips. Tingling feet and hands, hands go white and shiver.  Have you had these symptoms before?: Yes  How long have you been having these symptoms?: Greater than 2 weeks  Please list any medications you are currently taking for this condition.: Tylenol,  Advil  Please describe any probable cause for these symptoms. : Joint pain due to hashimoto    Labs today will call with results.   Increase lisinopril to 20mg daily, monitor BP at home, call with elevated readings.   F/u in about 1-2 months for recheck.   Patient agrees with plan of care and understands instructions. Call if worsening symptoms or any problems or concerns.

## 2021-01-15 ENCOUNTER — TELEPHONE (OUTPATIENT)
Dept: FAMILY MEDICINE CLINIC | Facility: CLINIC | Age: 51
End: 2021-01-15

## 2021-01-15 LAB — ANA SER QL: NEGATIVE

## 2021-01-15 RX ORDER — COLCHICINE 0.6 MG/1
TABLET ORAL
Qty: 3 TABLET | Refills: 0 | Status: SHIPPED | OUTPATIENT
Start: 2021-01-15 | End: 2021-04-22 | Stop reason: SDUPTHER

## 2021-01-15 NOTE — TELEPHONE ENCOUNTER
PT CALLED WITH A QUESTION ABOUT THE MEDICATION THAT WAS SENT IN FOR HER TODAY FOR GOUT. SHE STATES SHE WANTS TO KNOW IF THAT IS SOMETHING SHE TAKES INDEFINITELY, OR JUST UNTIL THE GOUT CLEARS UP.    PLEASE ADVISE.    SHE CAN BE REACHED -[phone]

## 2021-01-15 NOTE — TELEPHONE ENCOUNTER
We will try the acute dose for now to see if this helps symptoms as her uric acid level was elevated, if it helps or pain persists we can consider daily dose of medication. Have her let me know how she is doing next week.    4 weeks

## 2021-01-28 RX ORDER — LISINOPRIL 10 MG/1
10 TABLET ORAL DAILY
Qty: 30 TABLET | Refills: 2 | OUTPATIENT
Start: 2021-01-28

## 2021-03-24 ENCOUNTER — BULK ORDERING (OUTPATIENT)
Dept: CASE MANAGEMENT | Facility: OTHER | Age: 51
End: 2021-03-24

## 2021-03-24 DIAGNOSIS — Z23 IMMUNIZATION DUE: ICD-10-CM

## 2021-04-06 ENCOUNTER — OFFICE VISIT (OUTPATIENT)
Dept: FAMILY MEDICINE CLINIC | Facility: CLINIC | Age: 51
End: 2021-04-06

## 2021-04-06 ENCOUNTER — TELEPHONE (OUTPATIENT)
Dept: FAMILY MEDICINE CLINIC | Facility: CLINIC | Age: 51
End: 2021-04-06

## 2021-04-06 VITALS
RESPIRATION RATE: 20 BRPM | TEMPERATURE: 96.8 F | WEIGHT: 155 LBS | SYSTOLIC BLOOD PRESSURE: 148 MMHG | HEIGHT: 62 IN | HEART RATE: 94 BPM | DIASTOLIC BLOOD PRESSURE: 90 MMHG | OXYGEN SATURATION: 99 % | BODY MASS INDEX: 28.52 KG/M2

## 2021-04-06 DIAGNOSIS — T78.3XXA ANGIOEDEMA, INITIAL ENCOUNTER: Primary | ICD-10-CM

## 2021-04-06 DIAGNOSIS — I10 ESSENTIAL HYPERTENSION: ICD-10-CM

## 2021-04-06 DIAGNOSIS — Z88.8 ALLERGY TO LISINOPRIL: ICD-10-CM

## 2021-04-06 PROCEDURE — 99213 OFFICE O/P EST LOW 20 MIN: CPT | Performed by: NURSE PRACTITIONER

## 2021-04-06 PROCEDURE — 96372 THER/PROPH/DIAG INJ SC/IM: CPT | Performed by: NURSE PRACTITIONER

## 2021-04-06 RX ORDER — AMLODIPINE BESYLATE 5 MG/1
5 TABLET ORAL DAILY
Qty: 90 TABLET | Refills: 1 | Status: SHIPPED | OUTPATIENT
Start: 2021-04-06 | End: 2021-05-20 | Stop reason: DRUGHIGH

## 2021-04-06 RX ORDER — AMLODIPINE BESYLATE 5 MG/1
5 TABLET ORAL DAILY
Qty: 30 TABLET | Refills: 1 | Status: SHIPPED | OUTPATIENT
Start: 2021-04-06 | End: 2021-04-06

## 2021-04-06 RX ORDER — METHYLPREDNISOLONE 4 MG/1
TABLET ORAL
Qty: 21 TABLET | Refills: 0 | Status: SHIPPED | OUTPATIENT
Start: 2021-04-06 | End: 2021-05-20

## 2021-04-06 RX ORDER — METHYLPREDNISOLONE ACETATE 40 MG/ML
60 INJECTION, SUSPENSION INTRA-ARTICULAR; INTRALESIONAL; INTRAMUSCULAR; SOFT TISSUE ONCE
Status: COMPLETED | OUTPATIENT
Start: 2021-04-06 | End: 2021-04-06

## 2021-04-06 RX ADMIN — METHYLPREDNISOLONE ACETATE 60 MG: 40 INJECTION, SUSPENSION INTRA-ARTICULAR; INTRALESIONAL; INTRAMUSCULAR; SOFT TISSUE at 14:11

## 2021-04-06 NOTE — PROGRESS NOTES
"Chief Complaint  face swelling/itching (started yesterday-took otc benadryl yesterday), Cough (went talks alot feels like irritation in throat x 3 days), and Hypertension (b/p been elevated lately)    Subjective          Macie Sarabia presents to [facility]  History of Present Illness  C/o facial itching and swelling beginning yesterday with fatigue for a few days, with tongue swelling dry cough and eye swelling, tried benadryl OTC yesterday and helped slightly but still very itchy, with chronic uncontrolled HTN on lisinopril 10 mg > 1 year and recently increased to 20 mg this year, prev saw [facility] ER 01/21 for uncontrolled HTN, checks BP at home runs 140-160s, no other BP meds, NKDA, states prev allergy testing normal, no new soaps, detergents or perfumes, denies regular seasonal allergies, no recent sick exposure, no COVID-19 exposure, no outdoor gardening or insect bites, no rashes or fever    Objective   Vital Signs:   /90 (BP Location: Left arm, Patient Position: Sitting)   Pulse 94   Temp 96.8 °F (36 °C) (Infrared)   Resp 20   Ht 157.5 cm (62\")   Wt 70.3 kg (155 lb)   SpO2 99%   BMI 28.35 kg/m²     Physical Exam  Vitals reviewed.   Constitutional:       Appearance: She is well-developed.   HENT:      Head: Normocephalic and atraumatic.   Eyes:      Conjunctiva/sclera: Conjunctivae normal.      Pupils: Pupils are equal, round, and reactive to light.   Cardiovascular:      Rate and Rhythm: Normal rate and regular rhythm.      Pulses: Normal pulses.      Heart sounds: Normal heart sounds.   Pulmonary:      Effort: Pulmonary effort is normal.      Breath sounds: Normal breath sounds.      Comments: Dry cough  Musculoskeletal:         General: Normal range of motion.      Cervical back: Normal range of motion.      Right lower leg: No edema.      Left lower leg: No edema.   Skin:     General: Skin is warm and dry.      Comments: Facial swelling and edema around eyes, B " cheeks and lips, no rash   Neurological:      Mental Status: She is alert and oriented to person, place, and time.   Psychiatric:         Mood and Affect: Mood normal.         Behavior: Behavior normal.         Thought Content: Thought content normal.         Judgment: Judgment normal.        Result Review :                 Assessment and Plan    Diagnoses and all orders for this visit:    1. Angioedema, initial encounter (Primary)  -     methylPREDNISolone acetate (DEPO-medrol) injection 60 mg    2. Allergy to lisinopril    3. Essential hypertension    Other orders  -     amLODIPine (NORVASC) 5 MG tablet; Take 1 tablet by mouth Daily.  Dispense: 30 tablet; Refill: 1  -     methylPREDNISolone (MEDROL) 4 MG dose pack; Take as directed on package instructions.  Dispense: 21 tablet; Refill: 0        Follow Up   No follow-ups on file.  Patient was given instructions and counseling regarding her condition or for health maintenance advice. Please see specific information pulled into the AVS if appropriate.   Stop lisinopril suspect ACE allergy and gave pt handout, depomedrol shot 60 mg IM today, start medrol dose pack tomorrow counseled on SE, start amlodipine 5 mg daily and monitor BP at home, if swelling worsens concern for airway and breathing go to ER pt verbalized understanding

## 2021-04-06 NOTE — TELEPHONE ENCOUNTER
PATIENT CALLED TO UPDATE NIMCO BALES THAT THE ONLY MEDICATION THAT HAS CHANGED WAS THE rosuvastatin (Crestor) 20 MG tablet. SHE STATED THAT SHE FORGOT TO MENTION IT DURING HER APPOINTMENT BUT SHE DID TAKE THIS MEDICATION FOR THE FIRST TIME IN A WHILE AND BELIEVES IT COULD BE THE CAUSE FOR THE ALLERGIC REACTION.     PATIENT WOULD LIKE TO KNOW HOW TO PROCEED WITH HER MEDICATION.    PLEASE ADVISE  [phone]

## 2021-04-06 NOTE — TELEPHONE ENCOUNTER
She can hold crestor but don't think it is the cause of medication. Still think lisinopril caused swelling and don't want her on that medication and want her to take amlodipine and monitor BP and swelling and call with log in a week

## 2021-04-06 NOTE — PATIENT INSTRUCTIONS
Stop lisinopril suspect ACE allergy and gave pt handout, depomedrol shot 60 mg IM today, start medrol dose pack tomorrow counseled on SE, start amlodipine 5 mg daily and monitor BP at home, if swelling worsens concern for airway and breathing go to ER pt verbalized understanding  Angioedema  Angioedema is swelling in the body. The swelling can occur in any part of the body. It often happens on the skin. It may cause itchy, bumpy patches (hives) to form. This condition may:  · Occur only one time.  · Happen more than one time. It can also stop at any time.  · Keep coming back for a number of years. Someday it may stop.  What are the causes?  This condition may be caused by:  · Foods, such as milk, eggs, shellfish, wheat, or nuts.  · Medicines, such as ACE inhibitors, antibiotics, NSAIDs, birth control pills, or dyes used in X-rays.  Hereditary angioedema (HAE) is passed from parent to child. Symptoms can occur because of:  · Illness, infection, or stress.  · Changes in hormones.  · Exercise.  · Minor surgery.  · Dental work.  In some cases, the cause of this condition may not be known.  What increases the risk?  You are more likely to have HAE if you have family members with this condition.  What are the signs or symptoms?  Symptoms of this condition include:  · Swollen skin.  · Red, itchy patches of skin.  · Pain, pressure, or tenderness in the affected area.  · Swollen eyelids, face, lips, or tongue.  · Wheezing.  · Trouble drinking, swallowing, or closing the mouth completely.  · Being hoarse or having a sore throat.  · Problems breathing.  If your organs are affected:  · You may feel like vomiting.  · You may have pain in your belly.  · You may vomit or have watery poop (diarrhea).  · You may have trouble swallowing.  · You may have trouble peeing.  How is this treated?  To treat this condition, you may be told:  · To avoid things that cause attacks (triggers). These include foods or things that cause  allergies.  · To stop medicines that cause the condition.  · To take medicines to treat the condition.  In very bad cases, a breathing tube or a machine that helps with breathing (ventilator) may be used.  Follow these instructions at home:    · Take all medicines only as told by your doctor.  · If you were given medicines to treat allergies, always carry them with you.  · Wear a medical bracelet as told by your doctor.  · Avoid the things that cause attacks. These may include:  ? Foods.  ? Things in your environment (such as pollen).  ? Stress.  ? Exercise.  · Avoid all medicines that caused the attacks.  · Talk to your doctor before you have kids. Some types of this condition may be passed from parent to child.  Where to find more information  · American Academy of Allergy Asthma & Immunology: www.aaaai.org  Contact a doctor if:  · You have another attack.  · Your attacks happen more often, even after you take steps to prevent them.  · Your attacks are worse every time they occur.  · This condition was passed to you by your parents and you want to have kids.  Get help right away if:  · Your mouth, tongue, or lips get very swollen.  · Your swelling becomes worse.  · You have trouble breathing.  · You have trouble swallowing.  · You have trouble talking.  · You have chest pain or you feel dizzy.  · You faint.  These symptoms may be an emergency. Do not wait to see if the symptoms will go away. Get help right away. Call your local emergency services (911 in the [place] Do not drive yourself to the hospital.  Summary  · Angioedema is swelling that can happen in any part of the body.  · It can be caused by the food you eat or the medicines you are taking. It can also be passed from parent to child.  · Avoid the things that cause your attacks. These can be food, medicines, or things in your environment.  · If you were given medicines for allergies, always carry them with you.  · Get help right away if your mouth, tongue,  or lips get swollen. Also, get help right away if you have trouble breathing or swallowing.  This information is not intended to replace advice given to you by your health care provider. Make sure you discuss any questions you have with your health care provider.  Document Revised: 11/24/2020 Document Reviewed: 11/24/2020  Elsevier Patient Education © 2021 Elsevier Inc.

## 2021-04-19 RX ORDER — LISINOPRIL 20 MG/1
20 TABLET ORAL DAILY
Qty: 90 TABLET | OUTPATIENT
Start: 2021-04-19

## 2021-04-19 RX ORDER — LISINOPRIL 20 MG/1
20 TABLET ORAL DAILY
Qty: 30 TABLET | Refills: 0 | Status: SHIPPED | OUTPATIENT
Start: 2021-04-19 | End: 2021-05-20

## 2021-04-22 ENCOUNTER — TELEPHONE (OUTPATIENT)
Dept: FAMILY MEDICINE CLINIC | Facility: CLINIC | Age: 51
End: 2021-04-22

## 2021-04-22 RX ORDER — COLCHICINE 0.6 MG/1
TABLET ORAL
Qty: 3 TABLET | Refills: 0 | Status: SHIPPED | OUTPATIENT
Start: 2021-04-22 | End: 2021-06-06 | Stop reason: SDUPTHER

## 2021-04-22 NOTE — TELEPHONE ENCOUNTER
Caller: Macie Sarabia    Relationship: Self    Best call back number: [phone]    What medication are you requesting: colchicine 0.6 MG tablet    What are your current symptoms: IN RIGHT FOOT IN THE SAME PLACE THAT IT WAS BEFORE.    How long have you been experiencing symptoms: STARTED SUNDAY  Have you had these symptoms before:    [x] Yes  [] No    Have you been treated for these symptoms before:   [x] Yes  [] No    If a prescription is needed, what is your preferred pharmacy and phone number: [id number] [place], [place] - [address] AT [facility] [place] - [phone] [facility] [phone]      Additional notes: PATIENT KNOWS THAT THIS IS GOUT AGAIN AND WOULD LIKE TO HAVE A PRESCRIPTION FOR FLARE UPS LIKE THIS.

## 2021-05-03 RX ORDER — LEVOTHYROXINE SODIUM 75 UG/1
75 CAPSULE ORAL DAILY
Qty: 30 CAPSULE | Refills: 3 | Status: SHIPPED | OUTPATIENT
Start: 2021-05-03 | End: 2021-05-25 | Stop reason: DRUGHIGH

## 2021-05-20 ENCOUNTER — HOSPITAL ENCOUNTER (OUTPATIENT)
Dept: GENERAL RADIOLOGY | Facility: HOSPITAL | Age: 51
Discharge: HOME | End: 2021-05-20

## 2021-05-20 ENCOUNTER — OFFICE VISIT (OUTPATIENT)
Dept: FAMILY MEDICINE CLINIC | Facility: CLINIC | Age: 51
End: 2021-05-20

## 2021-05-20 VITALS
HEIGHT: 62 IN | RESPIRATION RATE: 18 BRPM | OXYGEN SATURATION: 99 % | BODY MASS INDEX: 27.97 KG/M2 | DIASTOLIC BLOOD PRESSURE: 92 MMHG | WEIGHT: 152 LBS | SYSTOLIC BLOOD PRESSURE: 142 MMHG | TEMPERATURE: 97.8 F | HEART RATE: 86 BPM

## 2021-05-20 DIAGNOSIS — E06.3 HYPOTHYROIDISM DUE TO HASHIMOTO'S THYROIDITIS: ICD-10-CM

## 2021-05-20 DIAGNOSIS — E03.8 HYPOTHYROIDISM DUE TO HASHIMOTO'S THYROIDITIS: ICD-10-CM

## 2021-05-20 DIAGNOSIS — M25.50 MULTIPLE JOINT PAIN: ICD-10-CM

## 2021-05-20 DIAGNOSIS — I10 ESSENTIAL HYPERTENSION: Primary | ICD-10-CM

## 2021-05-20 DIAGNOSIS — M79.671 CHRONIC FOOT PAIN, RIGHT: ICD-10-CM

## 2021-05-20 DIAGNOSIS — M1A.9XX0 CHRONIC GOUT WITHOUT TOPHUS, UNSPECIFIED CAUSE, UNSPECIFIED SITE: ICD-10-CM

## 2021-05-20 DIAGNOSIS — E78.2 MIXED DYSLIPIDEMIA: ICD-10-CM

## 2021-05-20 DIAGNOSIS — M25.571 CHRONIC PAIN OF RIGHT ANKLE: ICD-10-CM

## 2021-05-20 DIAGNOSIS — E55.9 VITAMIN D DEFICIENCY: ICD-10-CM

## 2021-05-20 DIAGNOSIS — G89.29 CHRONIC FOOT PAIN, RIGHT: ICD-10-CM

## 2021-05-20 DIAGNOSIS — G89.29 CHRONIC PAIN OF RIGHT ANKLE: ICD-10-CM

## 2021-05-20 PROBLEM — K21.9 ACID REFLUX: Status: ACTIVE | Noted: 2017-04-01

## 2021-05-20 PROBLEM — D25.9 UTERINE FIBROID: Status: ACTIVE | Noted: 2019-08-26

## 2021-05-20 PROBLEM — K59.00 CONSTIPATION: Status: ACTIVE | Noted: 2017-04-01

## 2021-05-20 LAB
25(OH)D3 SERPL-MCNC: 42.4 NG/ML (ref 30–100)
ALBUMIN SERPL-MCNC: 4.2 G/DL (ref 3.5–5.2)
ALBUMIN/GLOB SERPL: 1.9 G/DL
ALP SERPL-CCNC: 64 U/L (ref 39–117)
ALT SERPL W P-5'-P-CCNC: 14 U/L (ref 1–33)
ANION GAP SERPL CALCULATED.3IONS-SCNC: 9.8 MMOL/L (ref 5–15)
AST SERPL-CCNC: 11 U/L (ref 1–32)
BILIRUB SERPL-MCNC: 0.2 MG/DL (ref 0–1.2)
BUN SERPL-MCNC: 8 MG/DL (ref 6–20)
BUN/CREAT SERPL: 11.3 (ref 7–25)
CALCIUM SPEC-SCNC: 9.5 MG/DL (ref 8.6–10.5)
CHLORIDE SERPL-SCNC: 101 MMOL/L (ref 98–107)
CHOLEST SERPL-MCNC: 223 MG/DL (ref 0–200)
CHROMATIN AB SERPL-ACNC: <10 IU/ML (ref 0–14)
CO2 SERPL-SCNC: 25.2 MMOL/L (ref 22–29)
CREAT SERPL-MCNC: 0.71 MG/DL (ref 0.57–1)
CRP SERPL-MCNC: 0.39 MG/DL (ref 0–0.5)
ERYTHROCYTE [DISTWIDTH] IN BLOOD BY AUTOMATED COUNT: 14.2 % (ref 12.3–15.4)
ERYTHROCYTE [SEDIMENTATION RATE] IN BLOOD: 2 MM/HR (ref 0–30)
GFR SERPL CREATININE-BSD FRML MDRD: 87 ML/MIN/1.73
GLOBULIN UR ELPH-MCNC: 2.2 GM/DL
GLUCOSE SERPL-MCNC: 93 MG/DL (ref 65–99)
HCT VFR BLD AUTO: 39.7 % (ref 34–46.6)
HDLC SERPL-MCNC: 71 MG/DL (ref 40–60)
HGB BLD-MCNC: 12.5 G/DL (ref 12–15.9)
LDLC SERPL CALC-MCNC: 117 MG/DL (ref 0–100)
LDLC/HDLC SERPL: 1.57 {RATIO}
LYMPHOCYTES # BLD AUTO: 2.4 10*3/MM3 (ref 0.7–3.1)
LYMPHOCYTES NFR BLD AUTO: 35.7 % (ref 19.6–45.3)
MCH RBC QN AUTO: 31.4 PG (ref 26.6–33)
MCHC RBC AUTO-ENTMCNC: 31.4 G/DL (ref 31.5–35.7)
MCV RBC AUTO: 99.9 FL (ref 79–97)
MONOCYTES # BLD AUTO: 0.3 10*3/MM3 (ref 0.1–0.9)
MONOCYTES NFR BLD AUTO: 5.1 % (ref 5–12)
NEUTROPHILS NFR BLD AUTO: 3.9 10*3/MM3 (ref 1.7–7)
NEUTROPHILS NFR BLD AUTO: 59.2 % (ref 42.7–76)
PLATELET # BLD AUTO: 231 10*3/MM3 (ref 140–450)
PMV BLD AUTO: 7.5 FL (ref 6–12)
POTASSIUM SERPL-SCNC: 4.7 MMOL/L (ref 3.5–5.2)
PROT SERPL-MCNC: 6.4 G/DL (ref 6–8.5)
RBC # BLD AUTO: 3.97 10*6/MM3 (ref 3.77–5.28)
SODIUM SERPL-SCNC: 136 MMOL/L (ref 136–145)
T-UPTAKE NFR SERPL: 1.14 TBI (ref 0.8–1.3)
T4 SERPL-MCNC: 7.54 MCG/DL (ref 4.5–11.7)
TRIGL SERPL-MCNC: 202 MG/DL (ref 0–150)
TSH SERPL DL<=0.05 MIU/L-ACNC: 1.85 UIU/ML (ref 0.27–4.2)
URATE SERPL-MCNC: 6 MG/DL (ref 2.4–5.7)
VLDLC SERPL-MCNC: 35 MG/DL (ref 5–40)
WBC # BLD AUTO: 6.6 10*3/MM3 (ref 3.4–10.8)

## 2021-05-20 PROCEDURE — 99214 OFFICE O/P EST MOD 30 MIN: CPT | Performed by: NURSE PRACTITIONER

## 2021-05-20 PROCEDURE — 84443 ASSAY THYROID STIM HORMONE: CPT | Performed by: NURSE PRACTITIONER

## 2021-05-20 PROCEDURE — 85652 RBC SED RATE AUTOMATED: CPT | Performed by: NURSE PRACTITIONER

## 2021-05-20 PROCEDURE — 84479 ASSAY OF THYROID (T3 OR T4): CPT | Performed by: NURSE PRACTITIONER

## 2021-05-20 PROCEDURE — 80053 COMPREHEN METABOLIC PANEL: CPT | Performed by: NURSE PRACTITIONER

## 2021-05-20 PROCEDURE — 36415 COLL VENOUS BLD VENIPUNCTURE: CPT | Performed by: NURSE PRACTITIONER

## 2021-05-20 PROCEDURE — 86140 C-REACTIVE PROTEIN: CPT | Performed by: NURSE PRACTITIONER

## 2021-05-20 PROCEDURE — 73630 X-RAY EXAM OF FOOT: CPT

## 2021-05-20 PROCEDURE — 86431 RHEUMATOID FACTOR QUANT: CPT | Performed by: NURSE PRACTITIONER

## 2021-05-20 PROCEDURE — 80061 LIPID PANEL: CPT | Performed by: NURSE PRACTITIONER

## 2021-05-20 PROCEDURE — 73610 X-RAY EXAM OF ANKLE: CPT

## 2021-05-20 PROCEDURE — 82306 VITAMIN D 25 HYDROXY: CPT | Performed by: NURSE PRACTITIONER

## 2021-05-20 PROCEDURE — 84550 ASSAY OF BLOOD/URIC ACID: CPT | Performed by: NURSE PRACTITIONER

## 2021-05-20 PROCEDURE — 85025 COMPLETE CBC W/AUTO DIFF WBC: CPT | Performed by: NURSE PRACTITIONER

## 2021-05-20 PROCEDURE — 84436 ASSAY OF TOTAL THYROXINE: CPT | Performed by: NURSE PRACTITIONER

## 2021-05-20 RX ORDER — ALLOPURINOL 100 MG/1
100 TABLET ORAL DAILY
Qty: 30 TABLET | Refills: 1 | Status: SHIPPED | OUTPATIENT
Start: 2021-05-20 | End: 2021-05-20

## 2021-05-20 RX ORDER — ALLOPURINOL 100 MG/1
100 TABLET ORAL DAILY
Qty: 90 TABLET | Refills: 1 | Status: SHIPPED | OUTPATIENT
Start: 2021-05-20 | End: 2023-01-10

## 2021-05-20 NOTE — PROGRESS NOTES
Answers for HPI/ROS submitted by the patient on 5/20/2021  Please describe your symptoms.: Swelling pain , right foot, hand. Extreme fatigue. Body pain inflammation. , Have old Sprain in right foot constantly in pain. , High blood pressure , Obesity 30 lbs overweight,  nutrition and eating healthy not seemingly to have impact.  Unable to maintain healthy weight., Hashimoto, Hyper thyroid  Have you had these symptoms before?: Yes  How long have you been having these symptoms?: Greater than 2 weeks  Please list any medications you are currently taking for this condition.: Tirosint, Amlodipine, Rosuvastatin  Please describe any probable cause for these symptoms. : Fatigue , Inflammation, Pain, Not able to sustain cardio, Not able to run, can walk but with pain, Swelling, Obesity unable to maintain healthy weight  What is the primary reason for your visit?: Other    Chief Complaint  pain hands and feet (has had a couple rounds of steroids but then pain returns), Foot Pain (right foot always hurts ), and Weight Gain (not sure why she is gaining weight)    Subjective          Macie ANGEL LUIS Sarabia presents to [facility]  History of Present Illness  Here to FU on chronic chol on crestor 20 mg last chol 01/21  fasting for recheck today, With chronic HTN on amlodipine 5 mg and ASA 81 mg checks BP runs 150s/90s no CP dizziness HA LE edema, prev was taking lisinopril but had angioedema stopped last month with resolution of sx  c/o weight gain despite changing diet and exercise, With chronic hashimoto's hypothyroid on tirosint 75 mcg prev took levothyroxine and changed by endo Dr Morgan to help with sx of fatigue but hasn't changed, states oksana says very interrupted and not much deep sleep wondering if needs to go to sleep medicine  With generalized pain in hands and feet with last autoimmune labs 01/21 uric acid 7.2 and tried colchicine and rounds of steroids helps for a little bit but never  "resolves, has also taken tylenol and IBU prn, states prev dx RA as child but no eval rheum, with worsening R foot and ankle pain attributes to prev sprain never healed, prev imaging few years and recommended MRI if persist and never saw ortho    Objective   Vital Signs:   /92 (BP Location: Left arm, Patient Position: Sitting)   Pulse 86   Temp 97.8 °F (36.6 °C) (Infrared)   Resp 18   Ht 157.5 cm (62\")   Wt 68.9 kg (152 lb)   SpO2 99%   BMI 27.80 kg/m²     Physical Exam  Vitals reviewed.   Constitutional:       Appearance: She is well-developed.   HENT:      Head: Normocephalic and atraumatic.   Eyes:      Conjunctiva/sclera: Conjunctivae normal.      Pupils: Pupils are equal, round, and reactive to light.   Cardiovascular:      Rate and Rhythm: Normal rate and regular rhythm.      Pulses: Normal pulses.      Heart sounds: Normal heart sounds.   Pulmonary:      Effort: Pulmonary effort is normal.      Breath sounds: Normal breath sounds.   Abdominal:      General: There is no distension.      Palpations: Abdomen is soft. There is no mass.      Tenderness: There is no abdominal tenderness. There is no right CVA tenderness, left CVA tenderness, guarding or rebound.      Hernia: No hernia is present.   Musculoskeletal:         General: Tenderness (multiple joints, fingers, wrist, R foot and ankle, no obvious effusion) present. Normal range of motion.      Cervical back: Normal range of motion.      Right lower leg: No edema.      Left lower leg: No edema.   Skin:     General: Skin is warm and dry.   Neurological:      Mental Status: She is alert and oriented to person, place, and time.   Psychiatric:         Mood and Affect: Mood normal.         Behavior: Behavior normal.         Thought Content: Thought content normal.         Judgment: Judgment normal.        Result Review :                 Assessment and Plan    Diagnoses and all orders for this visit:    1. Essential hypertension (Primary)  -     CBC & " Differential  -     Comprehensive Metabolic Panel  -     Lipid Panel  -     Thyroid Panel With TSH    2. Mixed dyslipidemia  -     CBC & Differential  -     Comprehensive Metabolic Panel  -     Lipid Panel    3. Hypothyroidism due to Hashimoto's thyroiditis  -     Ambulatory Referral to Rheumatology  -     Thyroid Panel With TSH  -     JORGE L  -     C-reactive Protein  -     Rheumatoid Factor  -     Sedimentation Rate  -     Uric Acid    4. Vitamin D deficiency  -     Vitamin D 25 Hydroxy    5. Multiple joint pain  -     Ambulatory Referral to Rheumatology  -     JORGE L  -     C-reactive Protein  -     Rheumatoid Factor  -     Sedimentation Rate  -     Uric Acid    6. Chronic gout without tophus, unspecified cause, unspecified site  -     Ambulatory Referral to Rheumatology    7. Chronic foot pain, right  -     Ambulatory Referral to Rheumatology  -     Uric Acid  -     XR Foot 2 View Right    8. Chronic pain of right ankle  -     Ambulatory Referral to Rheumatology  -     XR Ankle 3+ View Right    Other orders  -     allopurinol (Zyloprim) 100 MG tablet; Take 1 tablet by mouth Daily.  Dispense: 30 tablet; Refill: 1        Follow Up   No follow-ups on file.  Patient was given instructions and counseling regarding her condition or for health maintenance advice. Please see specific information pulled into the AVS if appropriate.   Check labs and call with results, increase amlodipine 10 mg daily and check BP at home, consider dose adjustment with thyroid medication pending labs, discussed SE of long term use steroids and refer derm, consider some weight gain d/t steroids and thyroid, trial allopurinol 100 mg daily and monitor, may need mobic if sx persist or worsen, xray foot and ankle today through hospital radiology and call with results may need ortho or MRI

## 2021-05-20 NOTE — PATIENT INSTRUCTIONS
Check labs and call with results, increase amlodipine 10 mg daily and check BP at home, consider dose adjustment with thyroid medication pending labs, discussed SE of long term use steroids and refer derm, consider some weight gain d/t steroids and thyroid, trial allopurinol 100 mg daily and monitor, may need mobic if sx persist or worsen, xray foot and ankle today through hospital radiology and call with results may need ortho or MRI

## 2021-05-21 LAB — ANA SER QL: NEGATIVE

## 2021-05-25 DIAGNOSIS — M19.90 ARTHRITIS: ICD-10-CM

## 2021-05-25 DIAGNOSIS — M25.571 CHRONIC PAIN OF RIGHT ANKLE: ICD-10-CM

## 2021-05-25 DIAGNOSIS — M79.671 CHRONIC FOOT PAIN, RIGHT: Primary | ICD-10-CM

## 2021-05-25 DIAGNOSIS — G89.29 CHRONIC PAIN OF RIGHT ANKLE: ICD-10-CM

## 2021-05-25 DIAGNOSIS — G89.29 CHRONIC FOOT PAIN, RIGHT: Primary | ICD-10-CM

## 2021-05-25 RX ORDER — AMLODIPINE BESYLATE 10 MG/1
10 TABLET ORAL DAILY
Qty: 90 TABLET | Refills: 1 | Status: SHIPPED | OUTPATIENT
Start: 2021-05-25 | End: 2021-08-30

## 2021-05-25 RX ORDER — LEVOTHYROXINE SODIUM 88 UG/1
88 TABLET ORAL DAILY
Qty: 90 TABLET | Refills: 0 | Status: SHIPPED | OUTPATIENT
Start: 2021-05-25 | End: 2021-07-29 | Stop reason: SDUPTHER

## 2021-05-25 RX ORDER — LEVOTHYROXINE SODIUM 88 UG/1
88 TABLET ORAL DAILY
Qty: 30 TABLET | Refills: 1 | Status: SHIPPED | OUTPATIENT
Start: 2021-05-25 | End: 2021-05-25

## 2021-06-07 RX ORDER — COLCHICINE 0.6 MG/1
TABLET ORAL
Qty: 3 TABLET | Refills: 2 | Status: SHIPPED | OUTPATIENT
Start: 2021-06-07 | End: 2021-06-18 | Stop reason: SDUPTHER

## 2021-06-11 ENCOUNTER — TRANSCRIBE ORDERS (OUTPATIENT)
Dept: ADMINISTRATIVE | Facility: HOSPITAL | Age: 51
End: 2021-06-11

## 2021-06-11 DIAGNOSIS — M19.90 ARTHRITIS: Primary | ICD-10-CM

## 2021-06-16 ENCOUNTER — TELEPHONE (OUTPATIENT)
Dept: FAMILY MEDICINE CLINIC | Facility: CLINIC | Age: 51
End: 2021-06-16

## 2021-06-16 NOTE — TELEPHONE ENCOUNTER
Advised patient no appts, scheduled her for televisit on Friday.  Advised to stop medication to see if swelling goes away and to seek Medical treatment at [facility] or ER if any worsening symptoms.

## 2021-06-16 NOTE — TELEPHONE ENCOUNTER
Caller: Macie Sarabia    Relationship to patient: Self    Best call back number: [phone]    Patient is needing: PATIENT CALLED STATING THAT SHE HAS BEEN EXPERIENCING SWELLING IN ANKLES. PATIENT STATES THAT SHE WAS RECENTLY PRESCRIBED MELOXICAM BY HER ORTHOPEDIC PROVIDER. PATIENT CONTACTED THEIR OFFICE AND WAS ADVISED TO CONTACT PCP TO REQUEST KIDNEY TEST. PATIENT IS REQUESTING A CALL BACK TO DISCUSS WHAT NEEDS TO HAPPEN NEXT

## 2021-06-17 ENCOUNTER — HOSPITAL ENCOUNTER (OUTPATIENT)
Dept: GENERAL RADIOLOGY | Facility: HOSPITAL | Age: 51
Discharge: HOME | End: 2021-06-17
Admitting: ORTHOPAEDIC SURGERY

## 2021-06-17 DIAGNOSIS — M19.90 ARTHRITIS: ICD-10-CM

## 2021-06-17 PROCEDURE — 77002 NEEDLE LOCALIZATION BY XRAY: CPT

## 2021-06-17 PROCEDURE — 25010000002 IOPAMIDOL 61 % SOLUTION: Performed by: ORTHOPAEDIC SURGERY

## 2021-06-17 PROCEDURE — 25010000003 LIDOCAINE 1 % SOLUTION: Performed by: ORTHOPAEDIC SURGERY

## 2021-06-17 PROCEDURE — 25010000002 METHYLPREDNISOLONE PER 125 MG: Performed by: ORTHOPAEDIC SURGERY

## 2021-06-17 RX ORDER — METHYLPREDNISOLONE SODIUM SUCCINATE 125 MG/2ML
80 INJECTION, POWDER, LYOPHILIZED, FOR SOLUTION INTRAMUSCULAR; INTRAVENOUS
Status: COMPLETED | OUTPATIENT
Start: 2021-06-17 | End: 2021-06-17

## 2021-06-17 RX ORDER — LIDOCAINE HYDROCHLORIDE 10 MG/ML
20 INJECTION, SOLUTION INFILTRATION; PERINEURAL ONCE
Status: COMPLETED | OUTPATIENT
Start: 2021-06-17 | End: 2021-06-17

## 2021-06-17 RX ORDER — BUPIVACAINE HYDROCHLORIDE 2.5 MG/ML
10 INJECTION, SOLUTION EPIDURAL; INFILTRATION; INTRACAUDAL ONCE
Status: COMPLETED | OUTPATIENT
Start: 2021-06-17 | End: 2021-06-17

## 2021-06-17 RX ADMIN — IOPAMIDOL 1 ML: 612 INJECTION, SOLUTION INTRAVENOUS at 11:26

## 2021-06-17 RX ADMIN — LIDOCAINE HYDROCHLORIDE 2 ML: 10 INJECTION, SOLUTION INFILTRATION; PERINEURAL at 11:26

## 2021-06-17 RX ADMIN — METHYLPREDNISOLONE SODIUM SUCCINATE 40 MG: 125 INJECTION, POWDER, LYOPHILIZED, FOR SOLUTION INTRAMUSCULAR; INTRAVENOUS at 11:26

## 2021-06-17 RX ADMIN — BUPIVACAINE HYDROCHLORIDE 2 ML: 2.5 INJECTION, SOLUTION EPIDURAL; INFILTRATION; INTRACAUDAL; PERINEURAL at 11:26

## 2021-06-18 ENCOUNTER — OFFICE VISIT (OUTPATIENT)
Dept: FAMILY MEDICINE CLINIC | Facility: CLINIC | Age: 51
End: 2021-06-18

## 2021-06-18 DIAGNOSIS — M25.473 ANKLE SWELLING, UNSPECIFIED LATERALITY: Primary | ICD-10-CM

## 2021-06-18 PROCEDURE — 99442 PR PHYS/QHP TELEPHONE EVALUATION 11-20 MIN: CPT | Performed by: NURSE PRACTITIONER

## 2021-06-18 RX ORDER — COLCHICINE 0.6 MG/1
TABLET ORAL
Qty: 3 TABLET | Refills: 1 | Status: SHIPPED | OUTPATIENT
Start: 2021-06-18 | End: 2021-07-22

## 2021-06-18 NOTE — PROGRESS NOTES
Chief Complaint  Foot Swelling    Subjective          Macie Sarabia presents to [facility]  History of Present Illness  You have chosen to receive care through a telephone visit. Do you consent to use a telephone visit for your medical care today? Yes  Time spent during visit, 15 minutes.   C/o edema, states swelling in ankles and feet, she states she had allergy to lisinopril about 1 month ago, angioedema. She is no longer taking this. She was taking mobic per podiatry but has not taken recently. She states states she tried water pills OTC, but did not help, states she also had pain, wondering about gout, she had uric acid 5/20/2021 with visit with [facility], given allopurinol 100mg daily. She states swelling has improved some today, she stopped mobic but did not help swelling. Also send in colchicine 6/7/2021, she did take allopurinol at the end of may and swelling began 1 week ago. She has also had swelling in hands, feet, states swelling has improved today. States she took colchicine yesterday for her three doses, states symptoms now improved. She does watch diet, she does have pending rheumatology consult for multiple joint pain. She did have steroid injection in foot yesterday.         Objective   Vital Signs:   There were no vitals taken for this visit.    Physical Exam  Pulmonary:      Effort: Pulmonary effort is normal.   Neurological:      Mental Status: She is oriented to person, place, and time.       physical exam limited d/t telephone visit.        Result Review :                 Assessment and Plan    Diagnoses and all orders for this visit:    1. Ankle swelling, unspecified laterality (Primary)    Other orders  -     colchicine 0.6 MG tablet; Take 1.2mg today then repeat 0.6mg 1 hour later.  Dispense: 3 tablet; Refill: 1        Follow Up   Return if symptoms worsen or fail to improve.  Patient was given instructions and counseling regarding her condition or for  health maintenance advice. Please see specific information pulled into the AVS if appropriate.     Refilled cochicine,   Low purine diet,   Cont f/u with rheumatology as scheduled.   Encouraged compression stockings and breaks when driving.  If symptoms persist call office.    Patient agrees with plan of care and understands instructions. Call if worsening symptoms or any problems or concerns.

## 2021-07-03 DIAGNOSIS — E78.2 MIXED DYSLIPIDEMIA: ICD-10-CM

## 2021-07-06 RX ORDER — ROSUVASTATIN CALCIUM 20 MG/1
20 TABLET, COATED ORAL NIGHTLY
Qty: 30 TABLET | Refills: 0 | Status: SHIPPED | OUTPATIENT
Start: 2021-07-06 | End: 2021-08-03

## 2021-07-22 RX ORDER — COLCHICINE 0.6 MG/1
TABLET ORAL
Qty: 3 TABLET | Refills: 1 | Status: SHIPPED | OUTPATIENT
Start: 2021-07-22 | End: 2021-08-03

## 2021-07-27 ENCOUNTER — OFFICE VISIT (OUTPATIENT)
Dept: ENDOCRINOLOGY | Age: 51
End: 2021-07-27

## 2021-07-27 VITALS
DIASTOLIC BLOOD PRESSURE: 72 MMHG | BODY MASS INDEX: 26.43 KG/M2 | OXYGEN SATURATION: 98 % | HEART RATE: 72 BPM | SYSTOLIC BLOOD PRESSURE: 128 MMHG | WEIGHT: 143.6 LBS | HEIGHT: 62 IN

## 2021-07-27 DIAGNOSIS — R53.82 CHRONIC FATIGUE: ICD-10-CM

## 2021-07-27 DIAGNOSIS — E03.9 ACQUIRED HYPOTHYROIDISM: Primary | ICD-10-CM

## 2021-07-27 DIAGNOSIS — R63.5 WEIGHT GAIN: ICD-10-CM

## 2021-07-27 DIAGNOSIS — E55.9 VITAMIN D DEFICIENCY: ICD-10-CM

## 2021-07-27 PROCEDURE — 99214 OFFICE O/P EST MOD 30 MIN: CPT | Performed by: INTERNAL MEDICINE

## 2021-07-27 RX ORDER — MELOXICAM 15 MG/1
15 TABLET ORAL DAILY
COMMUNITY
Start: 2021-06-04 | End: 2023-01-25 | Stop reason: SDUPTHER

## 2021-07-27 NOTE — PROGRESS NOTES
"Chief Complaint  Chief Complaint   Patient presents with   • Hypothyroidism   FOLLOW UP/ HYPOTHYROIDISM      Subjective          History of Present Illness    Macie Sarabia 50 y.o. presents as a F/u patient for the evaluation of Hypothyroidism.     Today in clinic pt reports that she was diagnosed with hypothyroidism about 5 years based on routine BW and her symptoms of her fatigue, weight gain.   On levothyroxine 88 mcg oral daily, compliant with her medication.     She complains of feeling tired, gaining weight about 20 pounds in the last 1 year, she has gained weight even after watching diet. She was steroids at one time for her joint pain and that resulted in weight gain at that time.   Does c/o hair loss, sleep is bad but takes OTC sleep aid . No c/o heat intolerance and does c/o cold intolerance. Does c/o tremors, racing of heart and no eye symptoms. Does c/o anxiety spells.     Denied c/o difficulty breathing, swallowing and does have hoarseness of her voice.   Does have family hx of thyroid disease.     In menopause, had hysterectomy in 2019. Left her ovaries in.     Hyperlipidemia - on crestor 20 mg po daily.     Patient used to see Dr. Morgan in the past, transferred the care to me today.    Reviewed primary care physician's/consulting physician documentation and lab results     I have reviewed the patient's allergies, medicines, past medical hx, family hx and social hx in detail.    Objective   Vital Signs:   /72   Pulse 72   Ht 157.5 cm (62\")   Wt 65.1 kg (143 lb 9.6 oz)   LMP 10/28/2019   SpO2 98%   BMI 26.26 kg/m²   Physical Exam   General appearance - no distress  Eyes- anicteric sclera  Ear nose and throat-external ears and nose normal.    Respiratory-normal chest on inspection.  No respiratory distress noted.  Skin-no rashes.  Neuro-alert and oriented x3            Result Review :   The following data was reviewed by: Parviz Monteiro MD on 07/27/2021:  Office Visit on 05/20/2021 "   Component Date Value Ref Range Status   • Glucose 05/20/2021 93  65 - 99 mg/dL Final   • BUN 05/20/2021 8  6 - 20 mg/dL Final   • Creatinine 05/20/2021 0.71  0.57 - 1.00 mg/dL Final   • Sodium 05/20/2021 136  136 - 145 mmol/L Final   • Potassium 05/20/2021 4.7  3.5 - 5.2 mmol/L Final   • Chloride 05/20/2021 101  98 - 107 mmol/L Final   • CO2 05/20/2021 25.2  22.0 - 29.0 mmol/L Final   • Calcium 05/20/2021 9.5  8.6 - 10.5 mg/dL Final   • Total Protein 05/20/2021 6.4  6.0 - 8.5 g/dL Final   • Albumin 05/20/2021 4.20  3.50 - 5.20 g/dL Final   • ALT (SGPT) 05/20/2021 14  1 - 33 U/L Final   • AST (SGOT) 05/20/2021 11  1 - 32 U/L Final   • Alkaline Phosphatase 05/20/2021 64  39 - 117 U/L Final   • Total Bilirubin 05/20/2021 0.2  0.0 - 1.2 mg/dL Final   • eGFR Non  Amer 05/20/2021 87  >60 mL/min/1.73 Final   • Globulin 05/20/2021 2.2  gm/dL Final   • A/G Ratio 05/20/2021 1.9  g/dL Final   • BUN/Creatinine Ratio 05/20/2021 11.3  7.0 - 25.0 Final   • Anion Gap 05/20/2021 9.8  5.0 - 15.0 mmol/L Final   • Total Cholesterol 05/20/2021 223* 0 - 200 mg/dL Final   • Triglycerides 05/20/2021 202* 0 - 150 mg/dL Final   • HDL Cholesterol 05/20/2021 71* 40 - 60 mg/dL Final   • LDL Cholesterol  05/20/2021 117* 0 - 100 mg/dL Final   • VLDL Cholesterol 05/20/2021 35  5 - 40 mg/dL Final   • LDL/HDL Ratio 05/20/2021 1.57   Final   • TSH 05/20/2021 1.850  0.270 - 4.200 uIU/mL Final   • T Uptake 05/20/2021 1.14  0.80 - 1.30 TBI Final   • T4, Total 05/20/2021 7.54  4.50 - 11.70 mcg/dL Final    T4 results may be falsely increased if patient taking Biotin.   • 25 Hydroxy, Vitamin D 05/20/2021 42.4  30.0 - 100.0 ng/ml Final   • JORGE L Direct 05/20/2021 Negative  Negative Final   • C-Reactive Protein 05/20/2021 0.39  0.00 - 0.50 mg/dL Final   • Rheumatoid Factor Quantitative 05/20/2021 <10.0  0.0 - 14.0 IU/mL Final   • Sed Rate 05/20/2021 2  0 - 30 mm/hr Final   • Uric Acid 05/20/2021 6.0* 2.4 - 5.7 mg/dL Final   • WBC 05/20/2021 6.60  3.40  - 10.80 10*3/mm3 Final   • RBC 05/20/2021 3.97  3.77 - 5.28 10*6/mm3 Final   • Hemoglobin 05/20/2021 12.5  12.0 - 15.9 g/dL Final   • Hematocrit 05/20/2021 39.7  34.0 - 46.6 % Final   • RDW 05/20/2021 14.2  12.3 - 15.4 % Final   • MCV 05/20/2021 99.9* 79.0 - 97.0 fL Final   • MCH 05/20/2021 31.4  26.6 - 33.0 pg Final   • MCHC 05/20/2021 31.4* 31.5 - 35.7 g/dL Final   • MPV 05/20/2021 7.5  6.0 - 12.0 fL Final   • Platelets 05/20/2021 231  140 - 450 10*3/mm3 Final   • Neutrophil % 05/20/2021 59.2  42.7 - 76.0 % Final   • Lymphocyte % 05/20/2021 35.7  19.6 - 45.3 % Final   • Monocyte % 05/20/2021 5.1  5.0 - 12.0 % Final   • Neutrophils, Absolute 05/20/2021 3.90  1.70 - 7.00 10*3/mm3 Final   • Lymphocytes, Absolute 05/20/2021 2.40  0.70 - 3.10 10*3/mm3 Final   • Monocytes, Absolute 05/20/2021 0.30  0.10 - 0.90 10*3/mm3 Final     Data reviewed:  documentation       Results Review:    I reviewed the patient's new clinical results.     Assessment and Plan    Problem List Items Addressed This Visit        Other    Vitamin D deficiency    Relevant Orders    TSH    T4, Free    T3, Free    Vitamin D 25 Hydroxy    TSH    T4, Free    T3, Free    Basic Metabolic Panel    Hemoglobin A1c    Vitamin B12 & Folate    Lipid Panel    Vitamin D 25 Hydroxy    Vitamin B12 & Folate    Chronic fatigue    Relevant Orders    TSH    T4, Free    T3, Free    Vitamin D 25 Hydroxy    TSH    T4, Free    T3, Free    Basic Metabolic Panel    Hemoglobin A1c    Vitamin B12 & Folate    Lipid Panel    Vitamin D 25 Hydroxy    Vitamin B12 & Folate      Other Visit Diagnoses     Acquired hypothyroidism    -  Primary    Relevant Orders    TSH    T4, Free    T3, Free    Vitamin D 25 Hydroxy    TSH    T4, Free    T3, Free    Basic Metabolic Panel    Hemoglobin A1c    Vitamin B12 & Folate    Lipid Panel    Vitamin D 25 Hydroxy    Vitamin B12 & Folate        Hypothyroidism-symptoms worse  Check thyroid panel  Adjust the dosage of the medication based on  "the work-up.  Would consider either Synthroid or Unithroid to see if patient's symptoms would improve.  We will give her samples if we have any samples available of the Synthroid or the Unithroid.    chronic fatigue  Check vitamin D, B12, folic acid levels.  Supplementing with these vitamins could help a little bit with her energy levels.    [facility] to help with weight loss.    Interpreted the blood work-up/imaging results performed by the primary care/consulting physician -    Refills sent to pharmacy    Follow Up     Patient was given instructions and counseling regarding her condition or for health maintenance advice. Please see specific information pulled into the AVS if appropriate.       Thank you for asking me to see your patient, Macie Sarabia in consultation.         Parviz Monteiro MD  07/27/21      EM[facility] Dragon / transcription disclaimer:     \"Dictated utilizing Dragon dictation\".              "

## 2021-07-28 LAB
25(OH)D3+25(OH)D2 SERPL-MCNC: 50.2 NG/ML (ref 30–100)
FOLATE SERPL-MCNC: 13.1 NG/ML (ref 4.78–24.2)
T3FREE SERPL-MCNC: 4.4 PG/ML (ref 2–4.4)
T4 FREE SERPL-MCNC: 1.8 NG/DL (ref 0.93–1.7)
TSH SERPL DL<=0.005 MIU/L-ACNC: 0.05 UIU/ML (ref 0.27–4.2)
VIT B12 SERPL-MCNC: 1134 PG/ML (ref 211–946)

## 2021-07-29 RX ORDER — LEVOTHYROXINE SODIUM 0.07 MG/1
75 TABLET ORAL DAILY
Qty: 90 TABLET | Refills: 3 | Status: SHIPPED | OUTPATIENT
Start: 2021-07-29 | End: 2022-07-12

## 2021-08-02 DIAGNOSIS — E78.2 MIXED DYSLIPIDEMIA: ICD-10-CM

## 2021-08-03 RX ORDER — ROSUVASTATIN CALCIUM 20 MG/1
20 TABLET, COATED ORAL NIGHTLY
Qty: 30 TABLET | Refills: 0 | Status: SHIPPED | OUTPATIENT
Start: 2021-08-03 | End: 2021-08-18

## 2021-08-03 RX ORDER — COLCHICINE 0.6 MG/1
TABLET ORAL
Qty: 3 TABLET | Refills: 1 | Status: SHIPPED | OUTPATIENT
Start: 2021-08-03 | End: 2021-09-23

## 2021-08-18 DIAGNOSIS — E78.2 MIXED DYSLIPIDEMIA: ICD-10-CM

## 2021-08-18 RX ORDER — ROSUVASTATIN CALCIUM 20 MG/1
20 TABLET, COATED ORAL NIGHTLY
Qty: 90 TABLET | Refills: 0 | Status: SHIPPED | OUTPATIENT
Start: 2021-08-18 | End: 2021-09-23

## 2021-08-23 RX ORDER — LEVOTHYROXINE SODIUM 88 UG/1
88 TABLET ORAL DAILY
Qty: 90 TABLET | Refills: 0 | Status: SHIPPED | OUTPATIENT
Start: 2021-08-23 | End: 2023-01-10 | Stop reason: SDUPTHER

## 2021-08-24 RX ORDER — LEVOTHYROXINE SODIUM 88 UG/1
88 TABLET ORAL DAILY
Qty: 90 TABLET | Refills: 0 | OUTPATIENT
Start: 2021-08-24

## 2021-08-24 NOTE — TELEPHONE ENCOUNTER
This was refilled yesterday, she should check with her endocrine Dr. Monteiro on dose and future refills as she manages this for her.

## 2021-08-30 RX ORDER — AMLODIPINE BESYLATE 10 MG/1
10 TABLET ORAL DAILY
Qty: 90 TABLET | Refills: 1 | Status: SHIPPED | OUTPATIENT
Start: 2021-08-30 | End: 2023-01-10

## 2021-09-23 DIAGNOSIS — E78.2 MIXED DYSLIPIDEMIA: ICD-10-CM

## 2021-09-23 RX ORDER — METHYLPREDNISOLONE 4 MG/1
TABLET ORAL
Qty: 21 EACH | Refills: 0 | Status: SHIPPED | OUTPATIENT
Start: 2021-09-23 | End: 2023-01-12

## 2021-09-23 RX ORDER — ROSUVASTATIN CALCIUM 20 MG/1
TABLET, COATED ORAL
Qty: 90 TABLET | Refills: 0 | Status: SHIPPED | OUTPATIENT
Start: 2021-09-23 | End: 2023-01-10

## 2021-09-23 RX ORDER — COLCHICINE 0.6 MG/1
TABLET ORAL
Qty: 3 TABLET | Refills: 1 | OUTPATIENT
Start: 2021-09-23

## 2021-09-23 RX ORDER — COLCHICINE 0.6 MG/1
TABLET ORAL
Qty: 3 TABLET | Refills: 1 | Status: SHIPPED | OUTPATIENT
Start: 2021-09-23 | End: 2021-12-22

## 2021-12-22 RX ORDER — COLCHICINE 0.6 MG/1
TABLET ORAL
Qty: 3 TABLET | Refills: 1 | Status: SHIPPED | OUTPATIENT
Start: 2021-12-22 | End: 2022-01-09

## 2022-01-09 RX ORDER — COLCHICINE 0.6 MG/1
TABLET ORAL
Qty: 3 TABLET | Refills: 1 | Status: SHIPPED | OUTPATIENT
Start: 2022-01-09 | End: 2022-04-04 | Stop reason: SDUPTHER

## 2022-04-04 RX ORDER — COLCHICINE 0.6 MG/1
TABLET ORAL
Qty: 3 TABLET | Refills: 1 | Status: SHIPPED | OUTPATIENT
Start: 2022-04-04 | End: 2023-02-17

## 2022-07-12 RX ORDER — LEVOTHYROXINE SODIUM 0.07 MG/1
75 TABLET ORAL DAILY
Qty: 90 TABLET | Refills: 3 | Status: SHIPPED | OUTPATIENT
Start: 2022-07-12 | End: 2023-01-10 | Stop reason: SDUPTHER

## 2023-01-10 ENCOUNTER — OFFICE VISIT (OUTPATIENT)
Dept: ENDOCRINOLOGY | Age: 53
End: 2023-01-10
Payer: COMMERCIAL

## 2023-01-10 VITALS
HEIGHT: 62 IN | TEMPERATURE: 97.8 F | BODY MASS INDEX: 25.76 KG/M2 | OXYGEN SATURATION: 99 % | SYSTOLIC BLOOD PRESSURE: 124 MMHG | WEIGHT: 140 LBS | DIASTOLIC BLOOD PRESSURE: 90 MMHG | HEART RATE: 85 BPM

## 2023-01-10 DIAGNOSIS — E03.9 ACQUIRED HYPOTHYROIDISM: Primary | ICD-10-CM

## 2023-01-10 PROCEDURE — 99213 OFFICE O/P EST LOW 20 MIN: CPT | Performed by: NURSE PRACTITIONER

## 2023-01-10 RX ORDER — LEVOTHYROXINE SODIUM 0.07 MG/1
75 TABLET ORAL DAILY
Qty: 90 TABLET | Refills: 1 | Status: SHIPPED | OUTPATIENT
Start: 2023-01-10 | End: 2023-01-12 | Stop reason: SDUPTHER

## 2023-01-10 NOTE — PROGRESS NOTES
Chief Complaint  Hypothyroidism due to Hashimoto's thyroiditis (Pt states that energy levels have been low, weight is higher than expected, does have family hx of thyroid disease. )    Subjective        Macie Sarabia presents to [facility]  History of Present Illness     LOV 7/2021 with Kur[facility]  LOV with me 1/2021    Hypothyroid   Diagnosed around 2017  Positive hashimotos  Low energy has worsened  Aches and pains have worsened  Current thyroid dose levothyroxine 75 mcg daily  She has missed about 3 to 4 days worth of medication at this time    Objective   Vital Signs:  /90   Pulse 85   Temp 97.8 °F (36.6 °C) (Temporal)   Ht 157.5 cm (62\")   Wt 63.5 kg (140 lb)   SpO2 99%   BMI 25.61 kg/m²   Estimated body mass index is 25.61 kg/m² as calculated from the following:    Height as of this encounter: 157.5 cm (62\").    Weight as of this encounter: 63.5 kg (140 lb).             Physical Exam  Vitals reviewed.   Constitutional:       General: She is not in acute distress.  HENT:      Head: Normocephalic and atraumatic.   Eyes:      General:         Right eye: No discharge.         Left eye: No discharge.   Pulmonary:      Effort: Pulmonary effort is normal. No respiratory distress.   Musculoskeletal:         General: No signs of injury. Normal range of motion.      Cervical back: Normal range of motion and neck supple.   Skin:     General: Skin is warm and dry.   Neurological:      Mental Status: She is alert and oriented to person, place, and time. Mental status is at baseline.   Psychiatric:         Mood and Affect: Mood normal.         Behavior: Behavior normal.         Thought Content: Thought content normal.         Judgment: Judgment normal.        Result Review :  The following data was reviewed by: ROBERT Lewis on 01/10/2023:                   Assessment and Plan   Diagnoses and all orders for this visit:    1. Acquired hypothyroidism (Primary)  -     TSH  -      T4, Free  -     T3, Free    Other orders  -     levothyroxine (SYNTHROID, LEVOTHROID) 75 MCG tablet; Take 1 tablet by mouth Daily.  Dispense: 90 tablet; Refill: 1             Follow Up   Return in about 1 year (around 1/10/2024).     Labs today  Adjust dose as needed based on labs     Patient was given instructions and counseling regarding her condition or for health maintenance advice. Please see specific information pulled into the AVS if appropriate.     Maeve Landers APRN

## 2023-01-11 LAB
T3FREE SERPL-MCNC: 2.9 PG/ML (ref 2–4.4)
T4 FREE SERPL-MCNC: 0.79 NG/DL (ref 0.93–1.7)
TSH SERPL DL<=0.005 MIU/L-ACNC: 6.16 UIU/ML (ref 0.27–4.2)

## 2023-01-12 ENCOUNTER — OFFICE VISIT (OUTPATIENT)
Dept: INTERNAL MEDICINE | Age: 53
End: 2023-01-12
Payer: COMMERCIAL

## 2023-01-12 VITALS
OXYGEN SATURATION: 96 % | TEMPERATURE: 96.6 F | BODY MASS INDEX: 25.76 KG/M2 | WEIGHT: 140 LBS | HEART RATE: 94 BPM | DIASTOLIC BLOOD PRESSURE: 98 MMHG | HEIGHT: 62 IN | SYSTOLIC BLOOD PRESSURE: 150 MMHG

## 2023-01-12 DIAGNOSIS — I10 ESSENTIAL HYPERTENSION: ICD-10-CM

## 2023-01-12 DIAGNOSIS — Z87.39 HISTORY OF GOUT: ICD-10-CM

## 2023-01-12 DIAGNOSIS — Z11.59 ENCOUNTER FOR HEPATITIS C SCREENING TEST FOR LOW RISK PATIENT: ICD-10-CM

## 2023-01-12 DIAGNOSIS — E55.9 VITAMIN D DEFICIENCY: ICD-10-CM

## 2023-01-12 DIAGNOSIS — E06.3 HYPOTHYROIDISM DUE TO HASHIMOTO'S THYROIDITIS: ICD-10-CM

## 2023-01-12 DIAGNOSIS — E78.2 MIXED DYSLIPIDEMIA: ICD-10-CM

## 2023-01-12 DIAGNOSIS — M54.50 LUMBAR BACK PAIN: ICD-10-CM

## 2023-01-12 DIAGNOSIS — E03.9 ACQUIRED HYPOTHYROIDISM: Primary | ICD-10-CM

## 2023-01-12 DIAGNOSIS — E03.8 HYPOTHYROIDISM DUE TO HASHIMOTO'S THYROIDITIS: ICD-10-CM

## 2023-01-12 DIAGNOSIS — Z76.89 ENCOUNTER TO ESTABLISH CARE: Primary | ICD-10-CM

## 2023-01-12 DIAGNOSIS — Z12.31 ENCOUNTER FOR SCREENING MAMMOGRAM FOR MALIGNANT NEOPLASM OF BREAST: ICD-10-CM

## 2023-01-12 DIAGNOSIS — R73.09 ELEVATED GLUCOSE: ICD-10-CM

## 2023-01-12 PROBLEM — M19.079 ARTHRITIS OF MIDFOOT: Status: ACTIVE | Noted: 2021-10-12

## 2023-01-12 PROBLEM — M20.22 ACQUIRED HALLUX RIGIDUS, LEFT: Status: ACTIVE | Noted: 2021-10-12

## 2023-01-12 PROCEDURE — 99214 OFFICE O/P EST MOD 30 MIN: CPT

## 2023-01-12 RX ORDER — LEVOTHYROXINE SODIUM 0.07 MG/1
75 TABLET ORAL DAILY
Qty: 90 TABLET | Refills: 1 | Status: SHIPPED | OUTPATIENT
Start: 2023-01-12 | End: 2023-01-16 | Stop reason: SDUPTHER

## 2023-01-12 RX ORDER — LEVOTHYROXINE SODIUM 88 UG/1
88 TABLET ORAL DAILY
Qty: 30 TABLET | Refills: 0 | Status: SHIPPED | OUTPATIENT
Start: 2023-01-12 | End: 2023-03-14 | Stop reason: SDUPTHER

## 2023-01-12 RX ORDER — HYDROCHLOROTHIAZIDE 12.5 MG/1
12.5 TABLET ORAL DAILY
Qty: 30 TABLET | Refills: 0 | Status: SHIPPED | OUTPATIENT
Start: 2023-01-12 | End: 2023-01-27 | Stop reason: DRUGHIGH

## 2023-01-12 NOTE — PROGRESS NOTES
"    I N T E R N A L  M E D I C I N E  Heaven Arnett, ROBERT    ENCOUNTER DATE:  01/12/2023    Macie Sarabia / 52 y.o. / female      CHIEF COMPLAINT / REASON FOR OFFICE VISIT     Establish Care, Hypertension, and Hyperlipidemia      ASSESSMENT & PLAN     Diagnoses and all orders for this visit:    1. Encounter to establish care (Primary)    2. Essential hypertension  -     CBC & Differential  -     Comprehensive Metabolic Panel  -     Urinalysis With Microscopic If Indicated (No Culture) - Urine, Clean Catch  -     hydroCHLOROthiazide (HYDRODIURIL) 12.5 MG tablet; Take 1 tablet by mouth Daily.  Dispense: 30 tablet; Refill: 0    3. Mixed dyslipidemia  -     Lipid Panel With / Chol / HDL Ratio    4. Hypothyroidism due to Hashimoto's thyroiditis    5. Vitamin D deficiency  -     Vitamin D,25-Hydroxy    6. Elevated glucose  -     Hemoglobin A1c    7. History of gout  -     Uric acid    8. Lumbar back pain  -     Ambulatory Referral to Physical Therapy Evaluate and treat    9. Encounter for screening mammogram for malignant neoplasm of breast  -     Mammo Screening Bilateral With CAD; Future    10. Encounter for hepatitis C screening test for low risk patient  -     Hepatitis C Antibody         SUMMARY/DISCUSSION  • Agreeable to start HCTZ 12.5 mg daily and provide update on blood pressure readings in 1 week.  • No neurological deficits on today's exam.  Aware to visit ER for any chest pain, dyspnea, worsening headache, numbness, tingling, weakness, speech changes.      Next Appointment with me: Visit date not found    Return in about 1 month (around 2/12/2023) for Annual physical.      VITAL SIGNS     Visit Vitals  /98   Pulse 94   Temp 96.6 °F (35.9 °C)   Ht 157.5 cm (62.01\")   Wt 63.5 kg (140 lb)   LMP 10/28/2019   SpO2 96%   BMI 25.60 kg/m²             Wt Readings from Last 3 Encounters:   01/12/23 63.5 kg (140 lb)   01/10/23 63.5 kg (140 lb)   07/27/21 65.1 kg (143 lb 9.6 oz)     Body mass index is 25.6 " kg/m².        MEDICATIONS AT THE TIME OF OFFICE VISIT     Current Outpatient Medications on File Prior to Visit   Medication Sig Dispense Refill   • acetaminophen (TYLENOL) 650 MG 8 hr tablet      • colchicine 0.6 MG tablet TAKE 2 TABLETS BY MOUTH TODAY AND TAKE 1 TABLET BY MOUTH ONE HOUR LATER 3 tablet 1   • meloxicam (MOBIC) 15 MG tablet Take 15 mg by mouth Daily.       No current facility-administered medications on file prior to visit.        HISTORY OF PRESENT ILLNESS     Here to establish care.    She reports a prior hx of HTN, for which she was able to stop blood pressure medications several years ago after readings normalized.  Over the last couple weeks, she has experienced intermittent headaches, lightheadedness, nausea - all symptoms she has experienced before when she was hypertensive.  She recently resumed checking her blood pressure at home, and has been obtaining readings of 130-140/80-90.  Denies any chest pain, dyspnea, palpitations, exercise intolerance.  She did have prior reaction of angioedema with lisinopril and lower extremity swelling with amlodipine.  Does follow low sodium diet.    Hashimoto's: Takes levothyroxine 75 mcg daily.  Followed by endocrine, Dr. Monteiro.    Gout: Was on allopurinol at one point.  No symptoms at today's appointment.  She will use colchicine PRN for flairs.      Up to date on colonoscopy.   Has had a partial hysterectomy.  No further paps needed.  Followed by GYN, Dr. Santiago.    Does have ongoing, chronic lumbar back pain without radiculopathy symptoms, bowel/ bladder changes, saddle anesthesia.  Has never attended PT and no prior imaging.  Takes meloxicam 15 mg daily.    Patient Care Team:  Heaven Arnett APRN as PCP - General (Family Medicine)  Amanda Santiago MD as Consulting Physician (Obstetrics and Gynecology)  Parviz Monteiro MD as Consulting Physician (Endocrinology)    REVIEW OF SYSTEMS     Review of Systems   Constitutional: Negative for chills,  fatigue, fever and unexpected weight change.   Respiratory: Negative for cough, chest tightness and shortness of breath.    Cardiovascular: Negative for chest pain, palpitations and leg swelling.   Gastrointestinal: Positive for nausea.   Neurological: Positive for light-headedness and headaches. Negative for dizziness and weakness.   Psychiatric/Behavioral: The patient is not nervous/anxious.           PHYSICAL EXAMINATION     Physical Exam  Vitals reviewed.   Constitutional:       General: She is not in acute distress.     Appearance: Normal appearance. She is not ill-appearing, toxic-appearing or diaphoretic.   HENT:      Head: Normocephalic and atraumatic.   Eyes:      Extraocular Movements: Extraocular movements intact.      Conjunctiva/sclera: Conjunctivae normal.      Pupils: Pupils are equal, round, and reactive to light.   Cardiovascular:      Rate and Rhythm: Normal rate and regular rhythm.      Heart sounds: Normal heart sounds.   Pulmonary:      Effort: Pulmonary effort is normal.      Breath sounds: Normal breath sounds.   Neurological:      Mental Status: She is alert and oriented to person, place, and time. Mental status is at baseline.      Cranial Nerves: No cranial nerve deficit.      Sensory: No sensory deficit.      Motor: No weakness.      Coordination: Coordination normal.      Gait: Gait normal.   Psychiatric:         Mood and Affect: Mood normal.         Behavior: Behavior normal.         Thought Content: Thought content normal.         Judgment: Judgment normal.           REVIEWED DATA     Labs:           Imaging:            Medical Tests:           Summary of old records / correspondence / consultant report:           Request outside records:

## 2023-01-13 ENCOUNTER — TRANSCRIBE ORDERS (OUTPATIENT)
Dept: ADMINISTRATIVE | Facility: HOSPITAL | Age: 53
End: 2023-01-13
Payer: COMMERCIAL

## 2023-01-13 ENCOUNTER — TELEPHONE (OUTPATIENT)
Dept: ENDOCRINOLOGY | Age: 53
End: 2023-01-13

## 2023-01-13 ENCOUNTER — PATIENT ROUNDING (BHMG ONLY) (OUTPATIENT)
Dept: INTERNAL MEDICINE | Age: 53
End: 2023-01-13
Payer: COMMERCIAL

## 2023-01-13 DIAGNOSIS — E78.1 HYPERTRIGLYCERIDEMIA: Primary | ICD-10-CM

## 2023-01-13 DIAGNOSIS — Z12.31 SCREENING MAMMOGRAM FOR BREAST CANCER: Primary | ICD-10-CM

## 2023-01-13 LAB
25(OH)D3+25(OH)D2 SERPL-MCNC: 26.9 NG/ML (ref 30–100)
ALBUMIN SERPL-MCNC: 4.9 G/DL (ref 3.5–5.2)
ALBUMIN/GLOB SERPL: 3.1 G/DL
ALP SERPL-CCNC: 81 U/L (ref 39–117)
ALT SERPL-CCNC: 22 U/L (ref 1–33)
APPEARANCE UR: CLEAR
AST SERPL-CCNC: 15 U/L (ref 1–32)
BASOPHILS # BLD AUTO: 0.05 10*3/MM3 (ref 0–0.2)
BASOPHILS NFR BLD AUTO: 0.7 % (ref 0–1.5)
BILIRUB SERPL-MCNC: <0.2 MG/DL (ref 0–1.2)
BILIRUB UR QL STRIP: NEGATIVE
BUN SERPL-MCNC: 23 MG/DL (ref 6–20)
BUN/CREAT SERPL: 34.3 (ref 7–25)
CALCIUM SERPL-MCNC: 9.9 MG/DL (ref 8.6–10.5)
CHLORIDE SERPL-SCNC: 106 MMOL/L (ref 98–107)
CHOLEST SERPL-MCNC: 294 MG/DL (ref 0–200)
CHOLEST/HDLC SERPL: 6.53 {RATIO}
CO2 SERPL-SCNC: 23 MMOL/L (ref 22–29)
COLOR UR: YELLOW
CREAT SERPL-MCNC: 0.67 MG/DL (ref 0.57–1)
EGFRCR SERPLBLD CKD-EPI 2021: 104.7 ML/MIN/1.73
EOSINOPHIL # BLD AUTO: 0.18 10*3/MM3 (ref 0–0.4)
EOSINOPHIL NFR BLD AUTO: 2.4 % (ref 0.3–6.2)
ERYTHROCYTE [DISTWIDTH] IN BLOOD BY AUTOMATED COUNT: 12.4 % (ref 12.3–15.4)
GLOBULIN SER CALC-MCNC: 1.6 GM/DL
GLUCOSE SERPL-MCNC: 96 MG/DL (ref 65–99)
GLUCOSE UR QL STRIP: NEGATIVE
HBA1C MFR BLD: 4.8 % (ref 4.8–5.6)
HCT VFR BLD AUTO: 38.1 % (ref 34–46.6)
HCV AB S/CO SERPL IA: <0.1 S/CO RATIO (ref 0–0.9)
HDLC SERPL-MCNC: 45 MG/DL (ref 40–60)
HGB BLD-MCNC: 13.5 G/DL (ref 12–15.9)
HGB UR QL STRIP: NEGATIVE
IMM GRANULOCYTES # BLD AUTO: 0.01 10*3/MM3 (ref 0–0.05)
IMM GRANULOCYTES NFR BLD AUTO: 0.1 % (ref 0–0.5)
KETONES UR QL STRIP: NEGATIVE
LDLC SERPL CALC-MCNC: 120 MG/DL (ref 0–100)
LEUKOCYTE ESTERASE UR QL STRIP: NEGATIVE
LYMPHOCYTES # BLD AUTO: 2.3 10*3/MM3 (ref 0.7–3.1)
LYMPHOCYTES NFR BLD AUTO: 30.6 % (ref 19.6–45.3)
MCH RBC QN AUTO: 32.9 PG (ref 26.6–33)
MCHC RBC AUTO-ENTMCNC: 35.4 G/DL (ref 31.5–35.7)
MCV RBC AUTO: 92.9 FL (ref 79–97)
MONOCYTES # BLD AUTO: 0.67 10*3/MM3 (ref 0.1–0.9)
MONOCYTES NFR BLD AUTO: 8.9 % (ref 5–12)
NEUTROPHILS # BLD AUTO: 4.3 10*3/MM3 (ref 1.7–7)
NEUTROPHILS NFR BLD AUTO: 57.3 % (ref 42.7–76)
NITRITE UR QL STRIP: NEGATIVE
NRBC BLD AUTO-RTO: 0 /100 WBC (ref 0–0.2)
PH UR STRIP: 5.5 [PH] (ref 5–8)
PLATELET # BLD AUTO: 310 10*3/MM3 (ref 140–450)
POTASSIUM SERPL-SCNC: 4.4 MMOL/L (ref 3.5–5.2)
PROT SERPL-MCNC: 6.5 G/DL (ref 6–8.5)
PROT UR QL STRIP: NEGATIVE
RBC # BLD AUTO: 4.1 10*6/MM3 (ref 3.77–5.28)
SODIUM SERPL-SCNC: 140 MMOL/L (ref 136–145)
SP GR UR STRIP: 1.02 (ref 1–1.03)
TRIGL SERPL-MCNC: 712 MG/DL (ref 0–150)
URATE SERPL-MCNC: 5.7 MG/DL (ref 2.4–5.7)
UROBILINOGEN UR STRIP-MCNC: NORMAL MG/DL
VLDLC SERPL CALC-MCNC: 129 MG/DL (ref 5–40)
WBC # BLD AUTO: 7.51 10*3/MM3 (ref 3.4–10.8)

## 2023-01-13 RX ORDER — ICOSAPENT ETHYL 1000 MG/1
2 CAPSULE ORAL 2 TIMES DAILY WITH MEALS
Qty: 120 CAPSULE | Refills: 1 | Status: SHIPPED | OUTPATIENT
Start: 2023-01-13 | End: 2023-02-14 | Stop reason: SDUPTHER

## 2023-01-13 NOTE — PROGRESS NOTES
A My-Chart message has been sent to the patient for PATIENT ROUNDING with [facility]

## 2023-01-16 RX ORDER — LEVOTHYROXINE SODIUM 0.07 MG/1
75 TABLET ORAL DAILY
Qty: 60 TABLET | Refills: 1 | Status: SHIPPED | OUTPATIENT
Start: 2023-01-16

## 2023-01-18 ENCOUNTER — PRIOR AUTHORIZATION (OUTPATIENT)
Dept: ENDOCRINOLOGY | Age: 53
End: 2023-01-18
Payer: COMMERCIAL

## 2023-01-24 ENCOUNTER — HOSPITAL ENCOUNTER (OUTPATIENT)
Dept: MAMMOGRAPHY | Facility: HOSPITAL | Age: 53
Discharge: HOME | End: 2023-01-24
Payer: COMMERCIAL

## 2023-01-24 DIAGNOSIS — Z12.31 SCREENING MAMMOGRAM FOR BREAST CANCER: ICD-10-CM

## 2023-01-24 PROCEDURE — 77063 BREAST TOMOSYNTHESIS BI: CPT

## 2023-01-24 PROCEDURE — 77067 SCR MAMMO BI INCL CAD: CPT

## 2023-01-24 RX ORDER — SENNOSIDES 8.6 MG
650 CAPSULE ORAL EVERY 8 HOURS PRN
Qty: 30 TABLET | Refills: 2 | Status: SHIPPED | OUTPATIENT
Start: 2023-01-24 | End: 2023-03-06

## 2023-01-25 RX ORDER — MELOXICAM 15 MG/1
15 TABLET ORAL DAILY
Qty: 30 TABLET | Refills: 1 | Status: SHIPPED | OUTPATIENT
Start: 2023-01-25 | End: 2023-02-27

## 2023-01-27 ENCOUNTER — OFFICE VISIT (OUTPATIENT)
Dept: INTERNAL MEDICINE | Age: 53
End: 2023-01-27
Payer: COMMERCIAL

## 2023-01-27 VITALS
WEIGHT: 138 LBS | TEMPERATURE: 97.5 F | OXYGEN SATURATION: 98 % | DIASTOLIC BLOOD PRESSURE: 80 MMHG | BODY MASS INDEX: 25.4 KG/M2 | HEART RATE: 90 BPM | HEIGHT: 62 IN | SYSTOLIC BLOOD PRESSURE: 132 MMHG

## 2023-01-27 DIAGNOSIS — I10 ESSENTIAL HYPERTENSION: Primary | ICD-10-CM

## 2023-01-27 DIAGNOSIS — R92.8 ABNORMAL MAMMOGRAM OF LEFT BREAST: Primary | ICD-10-CM

## 2023-01-27 DIAGNOSIS — G44.52 NEW DAILY PERSISTENT HEADACHE: ICD-10-CM

## 2023-01-27 DIAGNOSIS — H65.192 OTHER NON-RECURRENT ACUTE NONSUPPURATIVE OTITIS MEDIA OF LEFT EAR: Primary | ICD-10-CM

## 2023-01-27 PROCEDURE — 99214 OFFICE O/P EST MOD 30 MIN: CPT

## 2023-01-27 RX ORDER — HYDROCHLOROTHIAZIDE 25 MG/1
25 TABLET ORAL DAILY
Qty: 90 TABLET | Refills: 0 | Status: SHIPPED | OUTPATIENT
Start: 2023-01-27 | End: 2023-02-22 | Stop reason: DRUGHIGH

## 2023-01-27 RX ORDER — AMOXICILLIN AND CLAVULANATE POTASSIUM 875; 125 MG/1; MG/1
1 TABLET, FILM COATED ORAL 2 TIMES DAILY
Qty: 20 TABLET | Refills: 0 | Status: SHIPPED | OUTPATIENT
Start: 2023-01-27 | End: 2023-02-06

## 2023-01-27 NOTE — PROGRESS NOTES
"    I N T E R N A L  M E D I C I N E  Heaven Arnett, APRN    ENCOUNTER DATE:  01/27/2023    Macie Sarabia / 52 y.o. / female      CHIEF COMPLAINT / REASON FOR OFFICE VISIT     Ear Fullness (/Left )      ASSESSMENT & PLAN     Diagnoses and all orders for this visit:    1. Other non-recurrent acute nonsuppurative otitis media of left ear (Primary)  -     amoxicillin-clavulanate (Augmentin) 875-125 MG per tablet; Take 1 tablet by mouth 2 (Two) Times a Day for 10 days.  Dispense: 20 tablet; Refill: 0    2. New daily persistent headache  -     MRI Brain With & Without Contrast; Future         SUMMARY/DISCUSSION  • Will treat with Augmentin x 10 days.  She was instructed to avoid putting any Q-tips or solution into her ears.  Return for non improving symptoms.  Seek follow up care for worsening symptoms or fever, chills.  • Agreeable to update Brain MRI given new, daily persistent headaches continuing after treatment for HTN.      Next Appointment with me: 2/17/2023    Return for Next scheduled follow up.      VITAL SIGNS     Visit Vitals  /80   Pulse 90   Temp 97.5 °F (36.4 °C)   Ht 157.5 cm (62.01\")   Wt 62.6 kg (138 lb)   LMP 10/28/2019   SpO2 98%   BMI 25.23 kg/m²             Wt Readings from Last 3 Encounters:   01/27/23 62.6 kg (138 lb)   01/12/23 63.5 kg (140 lb)   01/10/23 63.5 kg (140 lb)     Body mass index is 25.23 kg/m².        MEDICATIONS AT THE TIME OF OFFICE VISIT     Current Outpatient Medications on File Prior to Visit   Medication Sig Dispense Refill   • acetaminophen (TYLENOL) 650 MG 8 hr tablet Take 1 tablet by mouth Every 8 (Eight) Hours As Needed for Mild Pain. 30 tablet 2   • colchicine 0.6 MG tablet TAKE 2 TABLETS BY MOUTH TODAY AND TAKE 1 TABLET BY MOUTH ONE HOUR LATER 3 tablet 1   • hydroCHLOROthiazide (HYDRODIURIL) 25 MG tablet Take 1 tablet by mouth Daily. 90 tablet 0   • icosapent ethyl (Vascepa) 1 g capsule capsule Take 2 g by mouth 2 (Two) Times a Day With Meals. 120 capsule 1   • " levothyroxine (Synthroid) 88 MCG tablet Take 1 tablet by mouth Daily. Sat and Sunday. 75mcg mon-Friday 30 tablet 0   • levothyroxine (SYNTHROID, LEVOTHROID) 75 MCG tablet Take 1 tablet by mouth Daily. mon-Friday. 88mcg sat and Sunday 60 tablet 1   • meloxicam (MOBIC) 15 MG tablet Take 1 tablet by mouth Daily. 30 tablet 1   • [DISCONTINUED] hydroCHLOROthiazide (HYDRODIURIL) 12.5 MG tablet Take 1 tablet by mouth Daily. 30 tablet 0     No current facility-administered medications on file prior to visit.        HISTORY OF PRESENT ILLNESS     Here today for acute left ear pain and muffled hearing x 1 week.  She reports she attempted to clean out her ear with peroxide without improvement in symptoms.  Denies any fever, chills.      Pt's blood pressure continues to improve after starting HCTZ 25 mg daily.  Well controlled at today's visit, 132/80.  She reports her headache have improved, but remain persistent at a low level throughout the day.  Denies any vision changes, numbness, tingling, weakness.        Patient Care Team:  Heaven Arnett APRN as PCP - General (Family Medicine)  Amanda Santiago MD as Consulting Physician (Obstetrics and Gynecology)  Parviz Monteiro MD as Consulting Physician (Endocrinology)    REVIEW OF SYSTEMS     Review of Systems   Constitutional: Negative for chills, fever and unexpected weight change.   HENT: Positive for ear pain and hearing loss. Negative for ear discharge, rhinorrhea and sore throat.    Respiratory: Negative for cough, chest tightness and shortness of breath.    Cardiovascular: Negative for chest pain, palpitations and leg swelling.   Gastrointestinal: Negative for abdominal pain, diarrhea and vomiting.   Musculoskeletal: Negative for neck pain.   Skin: Negative for rash.   Neurological: Positive for headaches. Negative for dizziness, weakness and light-headedness.   Psychiatric/Behavioral: The patient is not nervous/anxious.           PHYSICAL EXAMINATION     Physical  Exam  Vitals reviewed.   Constitutional:       General: She is not in acute distress.     Appearance: Normal appearance. She is not ill-appearing, toxic-appearing or diaphoretic.   HENT:      Head: Normocephalic and atraumatic.      Right Ear: Tympanic membrane, ear canal and external ear normal. There is no impacted cerumen.      Left Ear: Ear canal and external ear normal. There is no impacted cerumen. Tympanic membrane is erythematous and bulging.   Eyes:      Pupils: Pupils are equal, round, and reactive to light.   Cardiovascular:      Rate and Rhythm: Normal rate and regular rhythm.      Heart sounds: Normal heart sounds.   Pulmonary:      Effort: Pulmonary effort is normal.      Breath sounds: Normal breath sounds.   Neurological:      General: No focal deficit present.      Mental Status: She is alert and oriented to person, place, and time. Mental status is at baseline.   Psychiatric:         Mood and Affect: Mood normal.         Behavior: Behavior normal.         Thought Content: Thought content normal.         Judgment: Judgment normal.           REVIEWED DATA     Labs:           Imaging:            Medical Tests:           Summary of old records / correspondence / consultant report:           Request outside records:

## 2023-02-09 ENCOUNTER — HOSPITAL ENCOUNTER (OUTPATIENT)
Dept: MRI IMAGING | Facility: HOSPITAL | Age: 53
Discharge: HOME | End: 2023-02-09
Payer: COMMERCIAL

## 2023-02-09 DIAGNOSIS — G44.52 NEW DAILY PERSISTENT HEADACHE: ICD-10-CM

## 2023-02-09 PROCEDURE — 82565 ASSAY OF CREATININE: CPT

## 2023-02-09 PROCEDURE — A9577 INJ MULTIHANCE: HCPCS

## 2023-02-09 PROCEDURE — 0 GADOBENATE DIMEGLUMINE 529 MG/ML SOLUTION

## 2023-02-09 PROCEDURE — 70553 MRI BRAIN STEM W/O & W/DYE: CPT

## 2023-02-09 RX ADMIN — GADOBENATE DIMEGLUMINE 12 ML: 529 INJECTION, SOLUTION INTRAVENOUS at 21:16

## 2023-02-10 ENCOUNTER — TELEPHONE (OUTPATIENT)
Dept: INTERNAL MEDICINE | Age: 53
End: 2023-02-10
Payer: COMMERCIAL

## 2023-02-10 DIAGNOSIS — G44.52 NEW DAILY PERSISTENT HEADACHE: ICD-10-CM

## 2023-02-10 DIAGNOSIS — Z86.73 HISTORY OF TIA (TRANSIENT ISCHEMIC ATTACK): ICD-10-CM

## 2023-02-10 DIAGNOSIS — R90.89 ABNORMAL FINDING ON MRI OF BRAIN: ICD-10-CM

## 2023-02-10 DIAGNOSIS — R90.82: Primary | ICD-10-CM

## 2023-02-10 LAB — CREAT BLDA-MCNC: 0.6 MG/DL (ref 0.6–1.3)

## 2023-02-10 NOTE — TELEPHONE ENCOUNTER
JACITVM INSTRUCTING PT TO RETURN CALL TO BE READ LAB RESULTS. LETTER SENT VIA [employer]

## 2023-02-10 NOTE — TELEPHONE ENCOUNTER
----- Message from ROBERT Bassett sent at 2/10/2023  1:20 PM EST -----  Please call pt.  I called to personally discuss results with pt, but received voicemail.      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  I would like to discuss more in person with the pt, and we can do so at follow up appointment on February 17.  I have placed neurology and genetics referrals at this time.  There was also an incidental find of degenerative disc changes at C2-C3 level.

## 2023-02-14 DIAGNOSIS — E78.1 HYPERTRIGLYCERIDEMIA: ICD-10-CM

## 2023-02-15 RX ORDER — ICOSAPENT ETHYL 1000 MG/1
2 CAPSULE ORAL 2 TIMES DAILY WITH MEALS
Qty: 120 CAPSULE | Refills: 1 | Status: SHIPPED | OUTPATIENT
Start: 2023-02-15 | End: 2023-03-17

## 2023-02-16 ENCOUNTER — HOSPITAL ENCOUNTER (OUTPATIENT)
Dept: PHYSICAL THERAPY | Facility: HOSPITAL | Age: 53
Setting detail: THERAPIES SERIES
Discharge: HOME | End: 2023-02-16
Payer: COMMERCIAL

## 2023-02-16 DIAGNOSIS — R26.2 DIFFICULTY WALKING: ICD-10-CM

## 2023-02-16 DIAGNOSIS — G89.29 CHRONIC BILATERAL LOW BACK PAIN WITHOUT SCIATICA: Primary | ICD-10-CM

## 2023-02-16 DIAGNOSIS — M54.50 CHRONIC BILATERAL LOW BACK PAIN WITHOUT SCIATICA: Primary | ICD-10-CM

## 2023-02-16 PROCEDURE — 97161 PT EVAL LOW COMPLEX 20 MIN: CPT

## 2023-02-16 PROCEDURE — 97110 THERAPEUTIC EXERCISES: CPT

## 2023-02-16 NOTE — THERAPY EVALUATION
Outpatient Physical Therapy Ortho Initial Evaluation  [facility]     Patient Name: Macie Sarabia  : 1970  MRN: [id number]  Today's Date: 2023      Visit Date: 2023    Patient Active Problem List   Diagnosis   • Menorrhagia with regular cycle   • Mixed dyslipidemia   • Vitamin D deficiency   • Essential hypertension   • Hypothyroidism due to Hashimoto's thyroiditis   • Symptomatic menopausal or female climacteric states   • Expressive aphasia   • Near syncope   • Chronic fatigue   • Uterine fibroid   • History of IBS   • Constipation   • Acid reflux   • Arthritis of midfoot   • Acquired hallux rigidus, left        Past Medical History:   Diagnosis Date   • Abnormal menses    • Anemia    • Dizziness     AT TIMES   • Fibroids    • GERD (gastroesophageal reflux disease)    • Hashimoto's disease    • Headache     ? ALLERGIES   • Hyperlipidemia    • Hypertension    • Hyperthyroidism    • Hypoglycemia    • Hypothyroidism    • Vitamin D deficiency         Past Surgical History:   Procedure Laterality Date   • COLONOSCOPY N/A 2020    Procedure: COLONOSCOPY into cecum/terminal ileum;  Surgeon: Scooter Gross MD;  Location: [facility] ENDOSCOPY;  Service: Gastroenterology   • ELBOW PROCEDURE Left     AS CHILD   • ENDOSCOPY     • ENDOSCOPY N/A 2020    Procedure: ESOPHAGOGASTRODUODENOSCOPY with biopsy;  Surgeon: Scooter Gross MD;  Location: [facility] ENDOSCOPY;  Service: Gastroenterology   • LAPAROSCOPY WITH LASER      EXPLORATORY   • TOTAL LAPAROSCOPIC HYSTERECTOMY N/A 2019    Procedure: TOTAL LAPAROSCOPIC HYSTERECTOMY BILATERAL SALPINGECTOMY;  Surgeon: Amanda Santiago MD;  Location: [facility] MAIN OR;  Service: Gynecology       Visit Dx:     ICD-10-CM ICD-9-CM   1. Chronic bilateral low back pain without sciatica  M54.50 724.2    G89.29 338.29   2. Difficulty walking  R26.2 719.7          Patient History     Row Name 23 1300             History    Chief Complaint  Pain  -CC      Type of Pain Back pain  -CC      Brief Description of Current Complaint Macie Sarabia is a 52 y.o. female who presents today with LBP. Onset of pain in November, sharp pains in lower part of back, ongoing for a while after that. Has had several more since that time. Unknown ALFIE. Denies radicular pain and n/t. No imaging to date. Macie Sarabia reports difficulty/increased pain with standing for long periods of time. Pain relieving factors include sitting down, lying down, Tiger Balm. PMH includes hx of R ankle sprain and likely CARLOS A, bar foot and ankle pain (hx of steroid injection), recent MRI with C spine degenerative changes.  -CC      Occupation/sports/leisure activities desk job  -CC      Results of Clinical Tests N/A  -CC         Pain     Pain Location Back;Neck  -CC      Pain at Present 4  -CC      Pain at Worst 9  -CC      Tolerance Time- Standing 60 min  -CC      Tolerance Time- Walking 20 min  -CC      Is your sleep disturbed? No  -CC         Fall Risk Assessment    Any falls in the past year: No  -CC         Daily Activities    Primary Language English  -CC      Pt Participated in POC and Goals Yes  -CC            User Key  (r) = Recorded By, (t) = Taken By, (c) = Cosigned By    Initials Name Provider Type    CC Adina Stevenson, PT Physical Therapist                 PT Ortho     Row Name 02/16/23 1300       Neural Tension Signs- Lower Quarter Clearing    Slump Bilateral:;Negative  -CC       Sensory Screen for Light Touch- Lower Quarter Clearing    L2 (anterior mid thigh) Intact  -CC    L3 (distal anterior thigh) Intact  -CC    L4 (medial lower leg/foot) Intact  -CC    L5 (lateral lower leg/great toe) Intact  -CC       Myotomal Screen- Lower Quarter Clearing    Hip flexion (L2) Bilateral:;4- (Good -)  -CC    Knee extension (L3) Bilateral:;5 (Normal)  -CC    Ankle DF (L4) Bilateral:;5 (Normal)  -CC    Knee flexion (S2) Bilateral:;4 (Good)  -CC       Lumbar ROM Screen- Lower Quarter  Clearing    Lumbar Flexion Normal  -CC    Lumbar Extension Impaired  25% ROM pain  -CC    Lumbar Rotation Impaired  50% ROM bar, no pain  -CC       SI/Hip Screen- Lower Quarter Clearing    Tarah's/Sai's test Bilateral:;Negative  -CC       Lumbosacral Palpation    SI Bilateral:;Tender  -CC    Piriformis Right:;Tender;Guarded/taut  -CC    Erector Spinae (Paraspinals) Bilateral:;Guarded/taut  -CC       General ROM    GENERAL ROM COMMENTS hip PROM WFL  -CC       MMT (Manual Muscle Testing)    Rt Lower Ext Rt Hip ABduction  -CC    Lt Lower Ext Lt Hip ABduction  -CC       MMT Right Lower Ext    Rt Hip ABduction MMT, Gross Movement (3+/5) fair plus  -CC       MMT Left Lower Ext    Lt Hip ABduction MMT, Gross Movement (4-/5) good minus  -CC       Sensation    Sensation WNL? WNL  -CC       Lower Extremity Flexibility    Hamstrings Bilateral:;Moderately limited  -CC    Gastrocnemius Bilateral:;Moderately limited  -CC          User Key  (r) = Recorded By, (t) = Taken By, (c) = Cosigned By    Initials Name Provider Type    Adina Coffey, PT Physical Therapist                            Therapy Education  Education Details: Access Code [email or web address]; eval findings, anatomy, goals of PT, POC  Given: HEP, Symptoms/condition management, Pain management  Program: New  How Provided: Verbal, Demonstration, Written  Provided to: Patient  Level of Understanding: Teach back education performed, Verbalized, Demonstrated      PT OP Goals     Row Name 02/16/23 1400          PT Short Term Goals    STG Date to Achieve 03/18/23  -     STG 1 Patient will be independent with education for symptom management and initial HEP to decrease LBP pain.  -     STG 1 Progress New  -     STG 2 Pt will report </=3/10 LBP with ADLs.  -     STG 2 Progress New  -        Long Term Goals    LTG Date to Achieve 04/17/23  -     LTG 1 Patient will be independent with education for symptom management and advanced HEP to decrease LBP pain.  -      LTG 1 Progress New  -CC     LTG 2 Pt will improve B hip strength to at least 4/5.  -CC     LTG 2 Progress New  -CC     LTG 3 Patient will improve walking tolerance to >30 min without LBP to improve participation in community activities.  -CC     LTG 3 Progress New  -CC     LTG 4 Patient will reduce level of percieved disability as measured by the Modified Oswestry to </= 20% disability in order to improve quality of life.  -CC     LTG 4 Progress New  -CC        Time Calculation    PT Goal Re-Cert Due Date 05/17/23  -CC           User Key  (r) = Recorded By, (t) = Taken By, (c) = Cosigned By    Initials Name Provider Type    CC Adina Stevenson, PT Physical Therapist                 PT Assessment/Plan     Row Name 02/16/23 1400          PT Assessment    Functional Limitations Impaired locomotion;Limitation in home management;Limitations in community activities;Limitations in functional capacity and performance;Performance in leisure activities;Performance in self-care ADL;Performance in work activities  -CC     Impairments Range of motion;Posture;Poor body mechanics;Pain;Muscle strength;Impaired flexibility  -CC     Assessment Comments Macie Sarabia is a 52 y.o. female referred to physical therapy for LBP. She presents with an evolving clinical presentation, along with no remarkable comorbidities and personal factors of desk job, sedentary, chronic bar foot/ankle pain that may impact her progress in the plan of care. Pt presents today with decreased bar LE posterior chain flexibility, TTP abr SIJ and R piriformis, decreased core strength and bar hip strength, and decreased lumbar rotation ROM . Her signs and symptoms are consistent with referring diagnosis. The previous impairments limit her ability to stand for long periods of time and perform ADLs. Pt will benefit from skilled PT to address the previous impairments and return to PLOF.  -CC     Please refer to paper survey for additional self-reported  information Yes  -CC     Rehab Potential Good  -CC     Patient/caregiver participated in establishment of treatment plan and goals Yes  -CC     Patient would benefit from skilled therapy intervention Yes  -CC        PT Plan    PT Frequency 1x/week;2x/week  -CC     Predicted Duration of Therapy Intervention (PT) 6-8 visits  -CC     Planned CPT's? PT EVAL LOW COMPLEXITY: 39984;PT RE-EVAL: 45768;PT THER PROC EA 15 MIN: 71284;PT THER ACT EA 15 MIN: 77067;PT MANUAL THERAPY EA 15 MIN: 16087;PT NEUROMUSC RE-EDUCATION EA 15 MIN: 39518;PT GAIT TRAINING EA 15 MIN: 71448;PT SELF CARE/HOME MGMT/TRAIN EA 15: 90375;PT HOT OR COLD PACK TREAT MCARE  -CC     PT Plan Comments next visit: nu step, review HEP. Add PPT/TrA, bridge, s/l clam, HL hip add with TrA, AR Press  -CC           User Key  (r) = Recorded By, (t) = Taken By, (c) = Cosigned By    Initials Name Provider Type    Adina Coffey, FRANKIE Physical Therapist                   OP Exercises     Row Name 02/16/23 1400             Total Minutes    42850 - PT Therapeutic Exercise Minutes 12  -CC         Exercise 1    Exercise Name 1 nu step  -CC      Additional Comments next visit  -CC         Exercise 2    Exercise Name 2 LTR  -CC      Cueing 2 Verbal  -CC      Reps 2 10 bar  -CC      Time 2 3 sec  -CC      Additional Comments small range  -CC         Exercise 3    Exercise Name 3 piriformis str  -CC      Cueing 3 Verbal  -CC      Reps 3 3b  -CC      Time 3 20 sec  -CC         Exercise 4    Exercise Name 4 HL hip abd  -CC      Cueing 4 Verbal  -CC      Sets 4 2  -CC      Reps 4 10  -CC      Time 4 RTB  -CC         Exercise 5    Exercise Name 5 seated HS str  -CC      Cueing 5 Verbal  -CC      Reps 5 3b  -CC      Time 5 20 sec  -CC            User Key  (r) = Recorded By, (t) = Taken By, (c) = Cosigned By    Initials Name Provider Type    Adina Coffey, PT Physical Therapist                              Outcome Measure Options: Modified Oswestry  Modified  Oswestry  Modified Oswestry Score/Comments: 20/50 = 40% disability      Time Calculation:     Start Time: 1344  Stop Time: 1422  Time Calculation (min): 38 min  Total Timed Code Minutes- PT: 12 minute(s)  Timed Charges  41472 - PT Therapeutic Exercise Minutes: 12  Untimed Charges  PT Eval/Re-eval Minutes: 26  Total Minutes  Timed Charges Total Minutes: 12  Untimed Charges Total Minutes: 26   Total Minutes: 38     Therapy Charges for Today     Code Description Service Date Service Provider Modifiers Qty    09305738412 HC PT THER PROC EA 15 MIN 2/16/2023 Adina Stevenson, PT GP 1    68232607293 HC PT EVAL LOW COMPLEXITY 2 2/16/2023 Adina Stevenson, PT GP 1          PT G-Codes  Outcome Measure Options: Modified Oswestry  Total: (P) 38  Modified Oswestry Score/Comments: 20/50 = 40% disability         Adina Stevenson, PT  2/16/2023

## 2023-02-17 ENCOUNTER — OFFICE VISIT (OUTPATIENT)
Dept: INTERNAL MEDICINE | Age: 53
End: 2023-02-17
Payer: COMMERCIAL

## 2023-02-17 VITALS
WEIGHT: 132 LBS | SYSTOLIC BLOOD PRESSURE: 122 MMHG | BODY MASS INDEX: 24.29 KG/M2 | HEART RATE: 95 BPM | DIASTOLIC BLOOD PRESSURE: 84 MMHG | HEIGHT: 62 IN | OXYGEN SATURATION: 99 % | TEMPERATURE: 97.6 F

## 2023-02-17 DIAGNOSIS — Z00.00 ANNUAL PHYSICAL EXAM: Primary | ICD-10-CM

## 2023-02-17 DIAGNOSIS — E78.2 MIXED DYSLIPIDEMIA: ICD-10-CM

## 2023-02-17 DIAGNOSIS — Z23 ENCOUNTER FOR IMMUNIZATION: ICD-10-CM

## 2023-02-17 DIAGNOSIS — G44.52 NEW DAILY PERSISTENT HEADACHE: ICD-10-CM

## 2023-02-17 DIAGNOSIS — I10 ESSENTIAL HYPERTENSION: ICD-10-CM

## 2023-02-17 DIAGNOSIS — Z86.73 HISTORY OF TIA (TRANSIENT ISCHEMIC ATTACK): ICD-10-CM

## 2023-02-17 PROBLEM — R92.8 ABNORMAL MAMMOGRAM OF LEFT BREAST: Status: ACTIVE | Noted: 2023-02-17

## 2023-02-17 PROCEDURE — 90715 TDAP VACCINE 7 YRS/> IM: CPT

## 2023-02-17 PROCEDURE — 90471 IMMUNIZATION ADMIN: CPT

## 2023-02-17 PROCEDURE — 99214 OFFICE O/P EST MOD 30 MIN: CPT

## 2023-02-17 PROCEDURE — 99396 PREV VISIT EST AGE 40-64: CPT

## 2023-02-17 RX ORDER — ATORVASTATIN CALCIUM 20 MG/1
20 TABLET, FILM COATED ORAL NIGHTLY
Qty: 90 TABLET | Refills: 0 | Status: SHIPPED | OUTPATIENT
Start: 2023-02-17

## 2023-02-17 NOTE — PROGRESS NOTES
"    I N T E R N A L  M E D I C I N E  Heaven Arnett, APRN      ENCOUNTER DATE:  02/17/2023    Macie Sarabia / 52 y.o. / female      CHIEF COMPLAINT     Annual Exam    February 2023 MRI Brain was suspicious for CADASIL.  She has been referred to neurology but has not scheduled.  Her headaches have improved following improved BP control, she does experience ongoing mild sensation of generalized head pressure.  She is using tylenol with some benefit.  Pt is adopted with unknown medical history, but reports son with similar symptoms, including possible TIA.  In the last couple years, she has felt memory slipping.      HTN: Well controlled on HCTZ 25 mg daily.    She is scheduled to follow up for abnormal left breast mammogram in March 2023.      VITALS     Visit Vitals  /84   Pulse 95   Temp 97.6 °F (36.4 °C)   Ht 157.5 cm (62.01\")   Wt 59.9 kg (132 lb)   LMP 10/28/2019   SpO2 99%   BMI 24.14 kg/m²       BP Readings from Last 3 Encounters:   02/17/23 122/84   01/27/23 132/80   01/12/23 150/98     Wt Readings from Last 3 Encounters:   02/17/23 59.9 kg (132 lb)   01/27/23 62.6 kg (138 lb)   01/12/23 63.5 kg (140 lb)      Body mass index is 24.14 kg/m².       MEDICATIONS     Current Outpatient Medications on File Prior to Visit   Medication Sig Dispense Refill   • acetaminophen (TYLENOL) 650 MG 8 hr tablet Take 1 tablet by mouth Every 8 (Eight) Hours As Needed for Mild Pain. 30 tablet 2   • hydroCHLOROthiazide (HYDRODIURIL) 25 MG tablet Take 1 tablet by mouth Daily. 90 tablet 0   • icosapent ethyl (Vascepa) 1 g capsule capsule Take 2 g by mouth 2 (Two) Times a Day With Meals. 120 capsule 1   • levothyroxine (Synthroid) 88 MCG tablet Take 1 tablet by mouth Daily. Sat and Sunday. 75mcg mon-Friday 30 tablet 0   • levothyroxine (SYNTHROID, LEVOTHROID) 75 MCG tablet Take 1 tablet by mouth Daily. mon-Friday. 88mcg sat and Sunday 60 tablet 1   • meloxicam (MOBIC) 15 MG tablet Take 1 tablet by mouth Daily. 30 tablet 1   • " [DISCONTINUED] colchicine 0.6 MG tablet TAKE 2 TABLETS BY MOUTH TODAY AND TAKE 1 TABLET BY MOUTH ONE HOUR LATER 3 tablet 1     No current facility-administered medications on file prior to visit.         HISTORY OF PRESENT ILLNESS     Macie presents for annual health maintenance visit.    · General health: fair  · Lifestyle:  · Attempting to lose weight?: Yes   · Diet: eats a well balanced, healthy diet  · Exercise: walks regularly  · Tobacco: Never used   · Alcohol: occasional/infrequent  · Work: Part-time  · Reproductive health:  · Sexually active?: No   · Sexual problems?: No problems  · Concern for STD?: No    · Sees Gynecologist?: No   · Lorena/Postmenopausal?: Yes   · Domestic abuse concerns: No   · Depression Screening:      PHQ-2/PHQ-9 Depression Screening 1/12/2023   Retired PHQ-9 Total Score -   Retired Total Score -   Little Interest or Pleasure in Doing Things 0-->not at all   Feeling Down, Depressed or Hopeless 0-->not at all   PHQ-9: Brief Depression Severity Measure Score 0         PHQ-2: 3 (Proceed to PHQ-9)   PHQ-9: 1-4 (Minimal Depression)    Patient Care Team:  Heaven Arnett APRN as PCP - General (Family Medicine)  Amanda Santiago MD as Consulting Physician (Obstetrics and Gynecology)  Parviz Monteiro MD as Consulting Physician (Endocrinology)      ALLERGIES  Allergies   Allergen Reactions   • Lisinopril Angioedema        PFSH:     The following portions of the patient's history were reviewed and updated as appropriate: Allergies / Current Medications / Past Medical History / Surgical History / Social History / Family History    PROBLEM LIST   Patient Active Problem List   Diagnosis   • Menorrhagia with regular cycle   • Mixed dyslipidemia   • Vitamin D deficiency   • Essential hypertension   • Hypothyroidism due to Hashimoto's thyroiditis   • Symptomatic menopausal or female climacteric states   • Expressive aphasia   • Near syncope   • Chronic fatigue   • Uterine fibroid   • History of IBS    • Constipation   • Acid reflux   • Arthritis of midfoot   • Acquired hallux rigidus, left       PAST MEDICAL HISTORY  Past Medical History:   Diagnosis Date   • Abnormal menses    • Anemia    • Arthritis 05/17/2021   • Cancer (HCC) 2010    Benign Basal cell carcinoma   • Dizziness     AT TIMES   • Fibroids    • GERD (gastroesophageal reflux disease)    • Hashimoto's disease    • Headache     ? ALLERGIES   • Hyperlipidemia    • Hypertension 2017   • Hyperthyroidism    • Hypoglycemia 2000   • Hypothyroidism    • Obesity 05/17/2018   • Vitamin D deficiency 2015       SURGICAL HISTORY  Past Surgical History:   Procedure Laterality Date   • COLONOSCOPY N/A 01/31/2020    Procedure: COLONOSCOPY into cecum/terminal ileum;  Surgeon: Scooter Gross MD;  Location: [facility] ENDOSCOPY;  Service: Gastroenterology   • ELBOW PROCEDURE Left     AS CHILD   • ENDOSCOPY     • ENDOSCOPY N/A 01/31/2020    Procedure: ESOPHAGOGASTRODUODENOSCOPY with biopsy;  Surgeon: Scooter Gross MD;  Location: [facility] ENDOSCOPY;  Service: Gastroenterology   • FRACTURE SURGERY  1980    Compound fracture left elbow   • LAPAROSCOPY WITH LASER      EXPLORATORY   • SUBTOTAL HYSTERECTOMY  11/11/2019   • TOTAL LAPAROSCOPIC HYSTERECTOMY N/A 11/11/2019    Procedure: TOTAL LAPAROSCOPIC HYSTERECTOMY BILATERAL SALPINGECTOMY;  Surgeon: Amanda Santiago MD;  Location: [facility] MAIN OR;  Service: Gynecology       SOCIAL HISTORY  Social History     Socioeconomic History   • Marital status: Single   Tobacco Use   • Smoking status: Never   • Smokeless tobacco: Never   Substance and Sexual Activity   • Alcohol use: Not Currently     Comment: SOCIALLY   • Drug use: No   • Sexual activity: Not Currently     Partners: Male     Birth control/protection: Abstinence       FAMILY HISTORY  Family History   Adopted: Yes   Problem Relation Age of Onset   • Diabetes Child    • Thyroid disease Child    • Diabetes Son    • Hyperlipidemia Son    • Hypertension Son    • Learning  disabilities Son    • Mental illness Son    • Stroke Son    • Thyroid disease Son    • Malrubi Hyperthermia Neg Hx    • Breast cancer Neg Hx        IMMUNIZATION HISTORY  Immunization History   Administered Date(s) Administered   • COVID-19 (MODERNA) 1st, 2nd, 3rd Dose Only 01/01/2021, 01/29/2021, 03/19/2021, 04/16/2021, 12/17/2021   • Shingrix 02/23/2020   • Tdap 02/17/2023         REVIEW OF SYSTEMS     Review of Systems   Constitutional: Negative for chills, fever and unexpected weight change.   Respiratory: Negative for cough, chest tightness and shortness of breath.    Cardiovascular: Negative for chest pain, palpitations and leg swelling.   Neurological: Positive for headaches. Negative for dizziness, weakness and light-headedness.   Psychiatric/Behavioral: The patient is not nervous/anxious.          PHYSICAL EXAMINATION     Physical Exam  Vitals reviewed.   Constitutional:       General: She is not in acute distress.     Appearance: Normal appearance. She is not ill-appearing, toxic-appearing or diaphoretic.   HENT:      Head: Normocephalic and atraumatic.      Right Ear: Tympanic membrane, ear canal and external ear normal. There is no impacted cerumen.      Left Ear: Tympanic membrane, ear canal and external ear normal. There is no impacted cerumen.      Nose: Nose normal. No congestion or rhinorrhea.      Mouth/Throat:      Mouth: Mucous membranes are moist.      Pharynx: Oropharynx is clear. No oropharyngeal exudate or posterior oropharyngeal erythema.   Eyes:      Extraocular Movements: Extraocular movements intact.      Conjunctiva/sclera: Conjunctivae normal.      Pupils: Pupils are equal, round, and reactive to light.   Cardiovascular:      Rate and Rhythm: Normal rate and regular rhythm.      Heart sounds: Normal heart sounds.   Pulmonary:      Effort: Pulmonary effort is normal. No respiratory distress.      Breath sounds: Normal breath sounds.   Abdominal:      General: Bowel sounds are normal.       Palpations: Abdomen is soft.      Tenderness: There is no abdominal tenderness.   Musculoskeletal:         General: Normal range of motion.      Cervical back: Normal range of motion and neck supple.      Right lower leg: No edema.      Left lower leg: No edema.   Lymphadenopathy:      Cervical: No cervical adenopathy.   Skin:     General: Skin is warm and dry.   Neurological:      General: No focal deficit present.      Mental Status: She is alert and oriented to person, place, and time. Mental status is at baseline.   Psychiatric:         Mood and Affect: Mood normal.         Behavior: Behavior normal.         Thought Content: Thought content normal.         Judgment: Judgment normal.         REVIEWED DATA      Labs:    Lab Results   Component Value Date     01/12/2023    K 4.4 01/12/2023    CALCIUM 9.9 01/12/2023    AST 15 01/12/2023    ALT 22 01/12/2023    BUN 23 (H) 01/12/2023    CREATININE 0.60 02/09/2023    CREATININE 0.67 01/12/2023    CREATININE 0.71 05/20/2021    EGFRIFNONA 87 05/20/2021    EGFRIFAFRI 93 01/05/2021       Lab Results   Component Value Date    GLUCOSE 96 01/12/2023    HGBA1C 4.80 01/12/2023    HGBA1C 5.20 01/05/2021    HGBA1C 4.95 05/07/2020    TSH 6.160 (H) 01/10/2023    FREET4 0.79 (L) 01/10/2023       Lab Results   Component Value Date     (H) 01/12/2023    HDL 45 01/12/2023    TRIG 712 (H) 01/12/2023    CHOLHDLRATIO 6.53 01/12/2023       Lab Results   Component Value Date    FIIW28XT 26.9 (L) 01/12/2023        Lab Results   Component Value Date    WBC 7.51 01/12/2023    HGB 13.5 01/12/2023    MCV 92.9 01/12/2023     01/12/2023       Lab Results   Component Value Date    PROTEIN Negative 01/12/2023    GLUCOSEU Negative 01/12/2023    BLOODU Negative 01/12/2023    NITRITEU Negative 01/12/2023    LEUKOCYTESUR Negative 01/12/2023          Lab Results   Component Value Date    HEPCVIRUSABY <0.1 01/12/2023       Imaging:        Medical Tests:        ASSESSMENT & PLAN      ANNUAL WELLNESS EXAM / PHYSICAL     Diagnoses and all orders for this visit:    1. Annual physical exam (Primary)    2. New daily persistent headache    3. History of TIA (transient ischemic attack)    4. Essential hypertension  Overview:  Continue HCTZ 25 mg daily.    Orders:  -     Basic metabolic panel    5. Mixed dyslipidemia  -     atorvastatin (LIPITOR) 20 MG tablet; Take 1 tablet by mouth Every Night.  Dispense: 90 tablet; Refill: 0  -     Lipid panel    6. Encounter for immunization  -     Tdap Vaccine Greater Than or Equal To 6yo IM       Summary/Discussion:     • Agreeable to recheck BMP after starting HCTZ.  • Discussed preventative measures given TIA history and workup for CADASIL.  Recommend daily aspirin 81 mg.  She will stop use of meloxicam and is agreeable to start aspirin 81 mg. Also discussed statin would be recommended.  She is agreeable to start, and was educated on potential side effects.  She was provided with phone number to assist with neurology scheduling.  Will wait on genetics referral pending neurology appointment.  • Will consider Shingles vaccine at pharmacy.     Next Appointment with me: Visit date not found    Return for 3 month chronic care, then 1 year annual physical.      HEALTHCARE MAINTENANCE ISSUES     Cancer Screening:  · Colon: Initial/Next screening at age: CURRENT  · Repeat colon cancer screening: every 10 years  · Breast: Recommended monthly self exams; annual professional exam  · Mammogram: every 1 year  · Cervical: N/A s/p total hysterectomy  · Skin: Monthly self skin examination, annual exam by health professional  · Lung: Does not meet criteria for lung cancer screening.   · Other:    Screening Labs & Tests:  · Lab results reviewed & discussed with the patient or test orders placed today.  · EKG:  · CV Screening: Lipid panel  · DEXA (65+ or postmenopausal with risk factors):   · HEP C (If born [id number], or risk factors): Previously had negative screen  · Other:      Immunization/Vaccinations (to be given today unless deferred by patient)  · Influenza: Recommended annual influenza vaccine  · Hepatitis A: Verify immunization records  · Hepatitis B: Verify immunization records  · Tetanus/Pertussis: Recommended here or at pharmacy  · Pneumovax: Verify immunization records  · Shingles: Recommended Shingrix at pharmacy  · COVID: Had the bivalent vaccine    Lifestyle Counseling:  · Lifestyle Modifications: Continue good lifestyle choices/modifications, Improve dietary compliance, Begin progressive aerobic exercise program 3-5 days a week, Follow a low fat, low cholesterol diet, Maintain low sodium diet (< 3 gm) for blood pressure and Reduce exposure to stress if possible  · Safety Issues: Always wear seatbelt, Avoid texting while driving   · Use sunscreen, regular skin examination  · Recommended annual dental/vision examination.  · Emotional/Stress/Sleep: Reviewed and  given when appropriate      Health Maintenance   Topic Date Due   • ZOSTER VACCINE (2 of 2) 04/19/2020   • INFLUENZA VACCINE  03/31/2023 (Originally 8/1/2022)   • LIPID PANEL  01/12/2024   • ANNUAL PHYSICAL  02/17/2024   • MAMMOGRAM  01/24/2025   • COLORECTAL CANCER SCREENING  01/31/2030   • TDAP/TD VACCINES (2 - Td or Tdap) 02/17/2033   • HEPATITIS C SCREENING  Completed   • COVID-19 Vaccine  Completed   • Pneumococcal Vaccine 0-64  Aged Out   • PAP SMEAR  Discontinued

## 2023-02-18 LAB
BUN SERPL-MCNC: 12 MG/DL (ref 6–20)
BUN/CREAT SERPL: 19 (ref 7–25)
CALCIUM SERPL-MCNC: 10.3 MG/DL (ref 8.6–10.5)
CHLORIDE SERPL-SCNC: 94 MMOL/L (ref 98–107)
CHOLEST SERPL-MCNC: 255 MG/DL (ref 0–200)
CO2 SERPL-SCNC: 27.7 MMOL/L (ref 22–29)
CREAT SERPL-MCNC: 0.63 MG/DL (ref 0.57–1)
EGFRCR SERPLBLD CKD-EPI 2021: 106.2 ML/MIN/1.73
GLUCOSE SERPL-MCNC: 83 MG/DL (ref 65–99)
HDLC SERPL-MCNC: 41 MG/DL (ref 40–60)
LDLC SERPL CALC-MCNC: 185 MG/DL (ref 0–100)
POTASSIUM SERPL-SCNC: 3.2 MMOL/L (ref 3.5–5.2)
SODIUM SERPL-SCNC: 137 MMOL/L (ref 136–145)
TRIGL SERPL-MCNC: 158 MG/DL (ref 0–150)
VLDLC SERPL CALC-MCNC: 29 MG/DL (ref 5–40)

## 2023-02-20 DIAGNOSIS — I10 ESSENTIAL HYPERTENSION: Primary | ICD-10-CM

## 2023-02-20 RX ORDER — AMLODIPINE BESYLATE 5 MG/1
5 TABLET ORAL DAILY
Qty: 30 TABLET | Refills: 0 | Status: SHIPPED | OUTPATIENT
Start: 2023-02-20 | End: 2023-03-14

## 2023-02-22 DIAGNOSIS — I10 ESSENTIAL HYPERTENSION: Primary | ICD-10-CM

## 2023-02-22 RX ORDER — HYDROCHLOROTHIAZIDE 12.5 MG/1
12.5 TABLET ORAL DAILY
Qty: 90 TABLET | Refills: 0 | Status: SHIPPED | OUTPATIENT
Start: 2023-02-22

## 2023-02-24 ENCOUNTER — TELEPHONE (OUTPATIENT)
Dept: PHYSICAL THERAPY | Facility: HOSPITAL | Age: 53
End: 2023-02-24
Payer: COMMERCIAL

## 2023-02-24 DIAGNOSIS — G89.29 CHRONIC BILATERAL LOW BACK PAIN WITHOUT SCIATICA: Primary | ICD-10-CM

## 2023-02-24 DIAGNOSIS — M54.50 CHRONIC BILATERAL LOW BACK PAIN WITHOUT SCIATICA: Primary | ICD-10-CM

## 2023-02-24 DIAGNOSIS — R26.2 DIFFICULTY WALKING: ICD-10-CM

## 2023-02-27 ENCOUNTER — OFFICE VISIT (OUTPATIENT)
Dept: NEUROLOGY | Facility: CLINIC | Age: 53
End: 2023-02-27
Payer: COMMERCIAL

## 2023-02-27 VITALS
HEIGHT: 62 IN | SYSTOLIC BLOOD PRESSURE: 130 MMHG | HEART RATE: 102 BPM | OXYGEN SATURATION: 97 % | BODY MASS INDEX: 25.03 KG/M2 | WEIGHT: 136 LBS | DIASTOLIC BLOOD PRESSURE: 78 MMHG

## 2023-02-27 DIAGNOSIS — G43.009 MIGRAINE WITHOUT AURA AND WITHOUT STATUS MIGRAINOSUS, NOT INTRACTABLE: ICD-10-CM

## 2023-02-27 DIAGNOSIS — R93.0 ABNORMAL MRI OF HEAD: Primary | ICD-10-CM

## 2023-02-27 PROCEDURE — 99204 OFFICE O/P NEW MOD 45 MIN: CPT | Performed by: STUDENT IN AN ORGANIZED HEALTH CARE EDUCATION/TRAINING PROGRAM

## 2023-02-27 RX ORDER — NAPROXEN 500 MG/1
500 TABLET ORAL DAILY PRN
Qty: 9 TABLET | Refills: 5 | Status: SHIPPED | OUTPATIENT
Start: 2023-02-27 | End: 2023-08-26

## 2023-02-27 RX ORDER — PROPRANOLOL HYDROCHLORIDE 20 MG/1
20 TABLET ORAL 2 TIMES DAILY
Qty: 180 TABLET | Refills: 1 | Status: SHIPPED | OUTPATIENT
Start: 2023-02-27

## 2023-02-27 NOTE — PROGRESS NOTES
"Chief Complaint   Patient presents with   • abnormal MRI       Patient ID: Macie Sarabia is a 52 y.o. female.    HPI:    The following portions of the patient's history were reviewed and updated as appropriate: allergies, current medications, past family history, past medical history, past social history, past surgical history and problem list.    Ms. Macie Sarabia is a 52-year-old female who presents to neurology clinic as a referral from ROBERT Alicea for abnormal MRI, history of TIA, and new daily persistent headache.    The patient reports that she was referred as a result of MRI results. She has had 2 TIAs over the last 3 to 5 years when her blood pressure \"would spike\" and she had headaches. The MRI was done because although her blood pressure was under control with medication her headaches were continuing. She had 1 TIA in 2020 and another episode during her appointment with ROBERT Edwards when she had severe head pain and nausea. She notes right-sided paralysis and that her mouth drooped on the right side. She had a difficult time articulating and speaking. She feels that the symptoms were the same on both occasions, however the paralysis on her right side, especially her right leg, was worse on the second occasion. Symptoms resolved within 24 hours. In 01/2023 or 02/2023 she had the same symptoms and went to the doctor and received treatment immediately.    She confirms that she is having memory troubles, which is a new symptom. She reports that the onset of her memory problems is about 6 months to 1-year ago. She was at a high performing job for 15 to 20 years when suddenly she felt like she \"could not keep pace.\" This is weird because things always \"come natural\" to her. She could always see the patterns or understand things and this stopped happening. She suddenly could not grasp new concepts and she would have to get up at 4:30 a.m. to prepare so that she could function properly, which was new " "to her. In the past she used to get up that early to work out. At that stage she was not sleeping well and everything felt extremely overwhelming. She did not feel like she could provide valuable contributions to her team and she took a different position, which was much easier, but very unfulfilling. She continued to have the same issues and she ended up taking a break from 07/2022 or 08/2022. She states that she has been working since she was 12-years-old and she loved her previous job. She reports that she has her mother who is in her 90's at home and she is caring for her. She would like to return to the workforce. She has noticed difficulty in articulating a word even though she knows what it is. Her family will either say the word for her to get the sentence finished or finish the story. She reports that she is aware that it is happening and will try and prepare what she is going to say ahead of time or sometimes she will limit her conversation. She denies noticing any inappropriate laughter or crying.    She states that her bladder control could be better. She reports that she has started utilizing [employer]. The patient reports that she had a hysterectomy in 2018 and only started having severe incontinence issues approximately 6 months ago. She denies any urgency, but she has a weird feeling that she needs to \"make sure she is prepared.\" She reports that this is a new symptom and she has no history of incontinence or urinary tract infections.    The patient reports that she has been having headaches which she had attributed to sinus or hypertension. The headaches started several weeks before she went to see the doctor approximately 1 year ago. At that stage she had stopped monitoring her blood pressure because it was back to normal. She reports that the headaches were what prompted her to see her doctor, who found that her blood pressure was high. Her blood pressure got normalized, but the headaches continued. " "She still wakes up with a headache and nausea. She states that she is slightly nauseous during today's visit and this is normal for her. She localizes the pain superior to her and then when her blood pressure is elevated the pain would move to the posterior aspect of her head. Sometimes she will have pain in the lateral aspect or frontal aspect of her head. She states that it feels like it is sinuses, or \"just pressure in her head.\" Sometimes her headaches will last the entire day if they are severe. Most of the time the headaches are mild, they are not debilitating, and she can carry on with her activities of daily living with the exception of reading, which becomes difficult. She reports that her headache today is a 3 out of 10. Her severe headaches can be as bad as an 8 or 10 out of 10. She will have severe headaches a couple times per week and prior to the hypertension medication it used to be every other day. She feels that the hypertension exacerbated her headaches. She reports that the headaches come on without warning. There is always accompanying nausea, which is a daily occurrence. Since she started taking her blood pressure medication, she thinks that the headaches are tolerable. She reports that the headaches make her want to avoid physical activity and she has some photophobia. She denies phonophobia in the past or feeling phonophobia or photophobia during today's visit. The patient denies taking any prescription medicines for headaches. She states that she would take Tylenol when she had headaches but denies taking it recently because of gastrointestinal side effects. Tylenol does not help and she will only take it when she really needs it. She inquires about CADASIL syndrome. She denies having any aura's prior to her headaches. The patient inquires if the headaches will eventually resolve in the long-term. The patient reports that she had a partial hysterectomy 2 to 3 years ago but she denies being in " "menopause.      She denies being depressed, she had slight depression during her job transition when she was slightly withdrawn because she still had to do everything.     She reports that she just had physical ailments, which she has had for years. She states that sometimes she has problems ambulating, intermittently it gets worse, and sometimes it is better. She reports that when this happened, she was not as active as she had been, and that comes and goes.     She denies ever having a myocardial infarction. She confirms having hypertension and hyperlipidemia. She follows a healthy balanced diet and exercises regularly. The patient confirms she is taking aspirin daily, which she started taking a couple of weeks ago. She notes that when she was younger her blood pressure tended to be on the low side. The patient confirms she has a blood pressure cuff at home.    She states that she remembers having a reaction to only 2 medications. One time she \"broke out on with her face and throat.\" She states that the other time, she had swelling in her ankles and feet. She confirms having an allergic reaction to LISINOPRIL.    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 She is adopted with no medical history. Her son has migraines and had a brain injury. Confirms that her son may have had a transient ischemic attack. Her son had a pituitary tumor and has hypertension.     Review of Systems      Review of Systems   Eyes: Negative for photophobia, redness and visual disturbance.   Gastrointestinal: Positive for nausea. Negative for diarrhea and vomiting.   Endocrine: Positive for cold intolerance. Negative for heat intolerance and polydipsia.   Genitourinary: Negative for decreased urine volume, difficulty urinating and urgency.   Musculoskeletal: Negative for back pain, neck pain and neck stiffness.   Skin: Negative for color change, rash and wound.   Allergic/Immunologic: Negative for " environmental allergies, food allergies and immunocompromised state.   Neurological: Positive for headaches. Negative for dizziness, tremors, seizures, syncope, facial asymmetry, speech difficulty, weakness, light-headedness and numbness.   Hematological: Negative for adenopathy. Does not bruise/bleed easily.   Psychiatric/Behavioral: Positive for decreased concentration and sleep disturbance. Negative for confusion. The patient is not nervous/anxious.          Ms. Macie Sarabia is a 52-year-old female with history of Hashimoto's, hypertension, event concerning for TIA versus atypical migraine with mild right facial droop and difficulty expressing her words.  It was recommended that she start aspirin 81 mg back in 2020 when this occurred.  Was recommended that she obtain a Zio patch at that time.  It was read as relatively benign.  She had an echo done with negative saline test results.  CTA of the head and neck were without any hemodynamically significant focal stenosis aneurysm or dissection.  In the EMR it was noted that her son may have had a potential TIA as well.  -Ask about pseudobulbar palsy, urinary incontinence, memory issues, migraine with aura,       Vitals:    02/27/23 1347   BP: 130/78   Pulse: 102   SpO2: 97%       Neurologic Exam     Mental Status   Level of consciousness: alert  MMSE 29 out of 30.  She said desk and set up table.  He does make a few paraphasic errors in speech today, substituting words.     Cranial Nerves     CN II   Visual fields full to confrontation.     CN III, IV, VI   Pupils are equal, round, and reactive to light.  Extraocular motions are normal.     CN V   Facial sensation intact.     CN VII   Facial expression full, symmetric.     CN VIII   Hearing: intact    CN IX, X   Palate: symmetric    CN XI   Right trapezius strength: normal  Left trapezius strength: normal    CN XII   Tongue: not atrophic  Fasciculations: absent  Tongue deviation: none    Motor Exam   Muscle bulk:  normal    Strength   Right deltoid: 5/5  Left deltoid: 5/5  Right biceps: 5/5  Left biceps: 5/5  Right triceps: 5/5  Left triceps: 5/5  Right iliopsoas: 5/5  Left iliopsoas: 5/5  Right quadriceps: 5/5  Left quadriceps: 5/5  Right hamstrin/5  Left hamstrin/5  Right anterior tibial: 5/5  Left anterior tibial: 5/5  Right gastroc: 5/5  Left gastroc: 5/5Grip 5 out of 5 bilaterally     Sensory Exam   Right arm light touch: normal  Left arm light touch: normal  Right leg light touch: normal  Left leg light touch: normal    Gait, Coordination, and Reflexes     Coordination   Finger to nose coordination: normal  Heel to shin coordination: normal    Reflexes   Right biceps: 2+  Left biceps: 2+  Right patellar: 2+  Left patellar: 2+      Physical Exam  Eyes:      Extraocular Movements: EOM normal.      Pupils: Pupils are equal, round, and reactive to light.   Neurological:      Coordination: Finger-Nose-Finger Test and Heel to Shin Test normal.      Deep Tendon Reflexes:      Reflex Scores:       Bicep reflexes are 2+ on the right side and 2+ on the left side.       Patellar reflexes are 2+ on the right side and 2+ on the left side.    Today: Memory screening test was normal. She had an almost perfect score. The patient correctly recalled her name, season, date, state, day of the week, county, city, health system, floor of building, spelt WORLD forward and backwards, recalled 3 out of 3 words except she said desk rather than table, named items, repeated phrase, followed instructions and written directions, wrote sentence, copied shapes, and completed clock draw with the time.     Procedures    Assessment/Plan:    Patient has migraine headaches with approximately 4/month being very severe.  They do not have an aura as is classic for CADASIL.  She also does not have clear memory impairments on testing today concerning for a oscar dementia.  Her clock draw was also normal.  She has had no subcortical strokes which is  another typical feature of CADASIL.  Her 2023 imaging is not as severe as textbook images of CADASIL I described to her today and she has had high blood pressure which can cause subcortical interval changes.  At this time I do not think that she has a definite finding of CADASIL and she is already getting treated for secondary prevention of stroke after her event in 2020. The features that are concerning for CADASIL is that her son has had events concerning for possible TIA versus complex migraine, and the location of these anterior temporal findings is not typical of that of hypertensive etiology.  However if it is this condition then there is not much therapeutic benefit.  After discussion the patient was agreeable to hold off on further work-up on this at this time giving the reason that there is not any treatment for this condition and we will continue to monitor this clinically as well as via imaging.       Plan:  -For history of TIA her goal LDL should be less than 70 goal of normotension goal of normoglycemia with management per primary care provider  -Reimage brain in 1 to 2 years to look for interval changes  -Start propranolol 20 mg twice daily for prevention of migraine headache, this may also help lower blood pressure  -Start naproxen 500 mg as needed 9 pills/month for treatment of migraine headache.  Discussed that she should drink a full glass of water with this medication  -Headache hygiene discussed  -Samples provided of Ubrelvy and Nurtec and side effects discussed.  Patient will let me know before next appointment or at next appointment if this sample was helpful for headache     Diagnoses and all orders for this visit:    1. Abnormal MRI of head (Primary)  -     MRI Brain With & Without Contrast; Future    2. Migraine without aura and without status migrainosus, not intractable    Other orders  -     propranolol (INDERAL) 20 MG tablet; Take 1 tablet by mouth 2 (Two) Times a Day.  Dispense: 180  tablet; Refill: 1  -     naproxen (Naprosyn) 500 MG tablet; Take 1 tablet by mouth Daily As Needed for Headache for up to 180 days. No more than 3 pills in a week  Dispense: 9 tablet; Refill: 5         1. Migraine headaches   Neuroimaging shows changes most consistent with migraines and hypertension. We discussed in detail medication options for migraines. Prescribed propranolol 20 mg twice a day as a preventative. Discussed the side effects of propranolol. She should stop propranolol should she have depression. She should monitor her blood pressure to ensure her systolic blood pressure is above 90 mmHg and her heart rate is above 60 bpm. Prescribed naproxen 500 mg with a full glass of water as needed. Provided samples of Nurtec 75 mg disintegrating tablet and Ubrelvy 50 mg today to be taken as needed. Recommended daily exercise for 20 minutes and intake of glasses of water daily, and 7 or more hours of sleep at night.    2. Transient ischemic attacks  MRI's of 2020 and 2023 are stable. I do not think she has CADASIL at this time. We discussed etiology and symptoms of CADASIL syndrome in detail. I do not recommend further testing for CADASIL syndrome. Neuroimaging does not show any signs of cerebrovascular accidents. A lot of transient ischemic attacks do not have headaches associated with them so the possibility still remains at this could be complex migraines.  The condition described previously also is typically associated with migraine with aura and she does not have aura. Continue to monitor and do repeat MRI imaging in 1 year.     3. Memory concerns  Memory testing today shows no signs of cognitive memory problems.    Follow-up in 4 months or sooner with any new or worsening symptoms.       Petros Shahid MD    Transcribed from ambient dictation for Petros Shahid MD by Ana Boston.  02/27/23   17:25 EST    Patient or patient representative verbalized consent to the visit recording.  I have personally performed the  services described in this document as transcribed by the above individual, and it is both accurate and complete.  Petros Shahid MD  2/28/2023  13:27 EST     I spent 52 minutes caring for this patient on this date of service. This time includes time spent by me in the following activities as necessary: preparing for the visit, reviewing tests, medical records and previous visits, obtaining and/or reviewing a separately obtained history, performing a medically appropriate exam and/or evaluation, counseling and educating the patient, and/or communicating with other healthcare professionals, documenting information in the medical record, independently interpreting results and communicating that information with the patient, and developing a medically appropriate treatment plan with consideration of other conditions, medications, and treatments.

## 2023-03-02 ENCOUNTER — HOSPITAL ENCOUNTER (OUTPATIENT)
Dept: MAMMOGRAPHY | Facility: HOSPITAL | Age: 53
Discharge: HOME | End: 2023-03-02
Payer: COMMERCIAL

## 2023-03-02 ENCOUNTER — HOSPITAL ENCOUNTER (OUTPATIENT)
Dept: ULTRASOUND IMAGING | Facility: HOSPITAL | Age: 53
Discharge: HOME | End: 2023-03-02
Payer: COMMERCIAL

## 2023-03-02 DIAGNOSIS — R92.8 ABNORMAL MAMMOGRAM OF LEFT BREAST: ICD-10-CM

## 2023-03-02 DIAGNOSIS — Z12.31 ENCOUNTER FOR SCREENING MAMMOGRAM FOR MALIGNANT NEOPLASM OF BREAST: Primary | ICD-10-CM

## 2023-03-02 PROCEDURE — G0279 TOMOSYNTHESIS, MAMMO: HCPCS

## 2023-03-02 PROCEDURE — 76642 ULTRASOUND BREAST LIMITED: CPT

## 2023-03-02 PROCEDURE — 77065 DX MAMMO INCL CAD UNI: CPT

## 2023-03-06 RX ORDER — ACETAMINOPHEN 650 MG/1
TABLET, FILM COATED, EXTENDED RELEASE ORAL
Qty: 30 TABLET | Refills: 2 | Status: SHIPPED | OUTPATIENT
Start: 2023-03-06

## 2023-03-10 ENCOUNTER — PATIENT ROUNDING (BHMG ONLY) (OUTPATIENT)
Dept: NEUROLOGY | Facility: CLINIC | Age: 53
End: 2023-03-10
Payer: COMMERCIAL

## 2023-03-13 DIAGNOSIS — I10 ESSENTIAL HYPERTENSION: ICD-10-CM

## 2023-03-14 RX ORDER — AMLODIPINE BESYLATE 5 MG/1
5 TABLET ORAL DAILY
Qty: 90 TABLET | Refills: 1 | Status: SHIPPED | OUTPATIENT
Start: 2023-03-14

## 2023-03-17 DIAGNOSIS — E78.1 HYPERTRIGLYCERIDEMIA: ICD-10-CM

## 2023-03-17 RX ORDER — ICOSAPENT ETHYL 1000 MG/1
CAPSULE ORAL
Qty: 120 CAPSULE | Refills: 0 | Status: SHIPPED | OUTPATIENT
Start: 2023-03-17

## 2023-03-20 RX ORDER — LEVOTHYROXINE SODIUM 88 UG/1
TABLET ORAL
Qty: 30 TABLET | Refills: 0 | Status: SHIPPED | OUTPATIENT
Start: 2023-03-20

## 2023-03-20 NOTE — TELEPHONE ENCOUNTER
Thyroid Hormones Protocol Failed 03/17/2023 08:06 AM   Protocol Details  Normal TSH in past 12 months    No active pregnancy on record    No positive pregnancy test in past year    Recent or future visit with authorizing provider

## 2023-04-15 DIAGNOSIS — I10 ESSENTIAL HYPERTENSION: ICD-10-CM

## 2023-04-15 DIAGNOSIS — E78.2 MIXED DYSLIPIDEMIA: ICD-10-CM

## 2023-04-15 DIAGNOSIS — E78.1 HYPERTRIGLYCERIDEMIA: ICD-10-CM

## 2023-04-17 RX ORDER — ICOSAPENT ETHYL 1000 MG/1
CAPSULE ORAL
Qty: 120 CAPSULE | Refills: 1 | Status: SHIPPED | OUTPATIENT
Start: 2023-04-17

## 2023-04-17 RX ORDER — ATORVASTATIN CALCIUM 20 MG/1
20 TABLET, FILM COATED ORAL NIGHTLY
Qty: 90 TABLET | Refills: 1 | Status: SHIPPED | OUTPATIENT
Start: 2023-04-17

## 2023-04-17 RX ORDER — LEVOTHYROXINE SODIUM 88 UG/1
TABLET ORAL
Qty: 30 TABLET | Refills: 0 | OUTPATIENT
Start: 2023-04-17

## 2023-04-17 RX ORDER — HYDROCHLOROTHIAZIDE 12.5 MG/1
12.5 TABLET ORAL DAILY
Qty: 90 TABLET | Refills: 1 | Status: SHIPPED | OUTPATIENT
Start: 2023-04-17

## 2023-04-17 NOTE — TELEPHONE ENCOUNTER
Thyroid Hormones Protocol Failed 04/15/2023 08:06 AM   Protocol Details  Normal TSH in past 12 months    No active pregnancy on record    No positive pregnancy test in past year    Recent or future visit with authorizing provider

## 2023-04-24 ENCOUNTER — TELEPHONE (OUTPATIENT)
Dept: ENDOCRINOLOGY | Age: 53
End: 2023-04-24
Payer: COMMERCIAL

## 2023-04-24 DIAGNOSIS — E03.9 ACQUIRED HYPOTHYROIDISM: Primary | ICD-10-CM

## 2023-04-24 NOTE — TELEPHONE ENCOUNTER
Patients coming for labs but there are no orders in, can we get those in please? Fredrick is wanting to send her an est for labs before she is seen

## 2023-06-05 RX ORDER — NAPROXEN 500 MG/1
TABLET ORAL
Qty: 9 TABLET | Refills: 5 | Status: SHIPPED | OUTPATIENT
Start: 2023-06-05

## 2023-06-12 DIAGNOSIS — E03.9 ACQUIRED HYPOTHYROIDISM: Primary | ICD-10-CM

## 2023-06-12 RX ORDER — LEVOTHYROXINE SODIUM 0.07 MG/1
75 TABLET ORAL DAILY
Qty: 90 TABLET | Refills: 1 | Status: SHIPPED | OUTPATIENT
Start: 2023-06-12

## 2023-07-31 RX ORDER — NAPROXEN 500 MG/1
TABLET ORAL
Qty: 9 TABLET | Refills: 5 | OUTPATIENT
Start: 2023-07-31

## 2023-08-29 DIAGNOSIS — E03.9 ACQUIRED HYPOTHYROIDISM: ICD-10-CM

## 2023-08-29 RX ORDER — LEVOTHYROXINE SODIUM 88 UG/1
TABLET ORAL
Qty: 30 TABLET | Refills: 0 | Status: SHIPPED | OUTPATIENT
Start: 2023-08-29

## 2023-08-29 RX ORDER — LEVOTHYROXINE SODIUM 0.07 MG/1
75 TABLET ORAL DAILY
Qty: 90 TABLET | Refills: 1 | Status: SHIPPED | OUTPATIENT
Start: 2023-08-29

## 2023-09-01 RX ORDER — PROPRANOLOL HYDROCHLORIDE 20 MG/1
TABLET ORAL
Qty: 180 TABLET | Refills: 1 | OUTPATIENT
Start: 2023-09-01

## 2023-09-06 DIAGNOSIS — I10 ESSENTIAL HYPERTENSION: ICD-10-CM

## 2023-09-06 RX ORDER — AMLODIPINE BESYLATE 5 MG/1
5 TABLET ORAL DAILY
Qty: 90 TABLET | Refills: 1 | Status: SHIPPED | OUTPATIENT
Start: 2023-09-06

## 2023-09-08 ENCOUNTER — TELEPHONE (OUTPATIENT)
Dept: INTERNAL MEDICINE | Age: 53
End: 2023-09-08
Payer: COMMERCIAL

## 2023-09-08 ENCOUNTER — TRANSCRIBE ORDERS (OUTPATIENT)
Dept: ADMINISTRATIVE | Facility: HOSPITAL | Age: 53
End: 2023-09-08
Payer: COMMERCIAL

## 2023-09-08 DIAGNOSIS — Z12.31 BREAST CANCER SCREENING BY MAMMOGRAM: Primary | ICD-10-CM
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
## 2023-10-03 ENCOUNTER — OFFICE VISIT (OUTPATIENT)
Dept: INTERNAL MEDICINE | Age: 53
End: 2023-10-03
Payer: COMMERCIAL

## 2023-10-03 VITALS
TEMPERATURE: 97.6 F | BODY MASS INDEX: 25.58 KG/M2 | HEIGHT: 62 IN | OXYGEN SATURATION: 97 % | DIASTOLIC BLOOD PRESSURE: 82 MMHG | HEART RATE: 77 BPM | SYSTOLIC BLOOD PRESSURE: 136 MMHG | WEIGHT: 139 LBS

## 2023-10-03 DIAGNOSIS — M77.01 MEDIAL EPICONDYLITIS OF RIGHT ELBOW: ICD-10-CM

## 2023-10-03 DIAGNOSIS — I10 ESSENTIAL HYPERTENSION: Primary | ICD-10-CM

## 2023-10-03 DIAGNOSIS — E78.2 MIXED DYSLIPIDEMIA: ICD-10-CM

## 2023-10-03 LAB
ALBUMIN SERPL-MCNC: 4.8 G/DL (ref 3.5–5.2)
ALBUMIN/GLOB SERPL: 2.4 G/DL
ALP SERPL-CCNC: 91 U/L (ref 39–117)
ALT SERPL-CCNC: 22 U/L (ref 1–33)
AST SERPL-CCNC: 15 U/L (ref 1–32)
BILIRUB SERPL-MCNC: 0.4 MG/DL (ref 0–1.2)
BUN SERPL-MCNC: 17 MG/DL (ref 6–20)
BUN/CREAT SERPL: 23.3 (ref 7–25)
CALCIUM SERPL-MCNC: 10.1 MG/DL (ref 8.6–10.5)
CHLORIDE SERPL-SCNC: 101 MMOL/L (ref 98–107)
CHOLEST SERPL-MCNC: 188 MG/DL (ref 0–200)
CHOLEST/HDLC SERPL: 3.13 {RATIO}
CO2 SERPL-SCNC: 25.8 MMOL/L (ref 22–29)
CREAT SERPL-MCNC: 0.73 MG/DL (ref 0.57–1)
EGFRCR SERPLBLD CKD-EPI 2021: 98.5 ML/MIN/1.73
GLOBULIN SER CALC-MCNC: 2 GM/DL
GLUCOSE SERPL-MCNC: 106 MG/DL (ref 65–99)
HDLC SERPL-MCNC: 60 MG/DL (ref 40–60)
LDLC SERPL CALC-MCNC: 100 MG/DL (ref 0–100)
POTASSIUM SERPL-SCNC: 3.8 MMOL/L (ref 3.5–5.2)
PROT SERPL-MCNC: 6.8 G/DL (ref 6–8.5)
SODIUM SERPL-SCNC: 140 MMOL/L (ref 136–145)
TRIGL SERPL-MCNC: 163 MG/DL (ref 0–150)
VLDLC SERPL CALC-MCNC: 28 MG/DL (ref 5–40)

## 2023-10-03 PROCEDURE — 99214 OFFICE O/P EST MOD 30 MIN: CPT

## 2023-10-03 NOTE — PROGRESS NOTES
"    I N T E R N A L  M E D I C I N E  Heaven Arnett, APRN    ENCOUNTER DATE:  10/03/2023    Macie Sarabia / 52 y.o. / female      CHIEF COMPLAINT / REASON FOR OFFICE VISIT     Hyperlipidemia, Hypertension, and Elbow Pain (Right, 1 month, no injury )      ASSESSMENT & PLAN     Diagnoses and all orders for this visit:    1. Essential hypertension (Primary)  Overview:  Continue HCTZ 25 mg daily and amlodipine 5 mg daily.    Orders:  -     Comprehensive Metabolic Panel    2. Mixed dyslipidemia  Overview:  Continue atorvastatin 20 mg daily, Vascepa 2 g BID.    Orders:  -     Comprehensive Metabolic Panel  -     Lipid Panel With / Chol / HDL Ratio    3. Medial epicondylitis of right elbow  -     Ambulatory Referral to Orthopedic Surgery         SUMMARY/DISCUSSION  She will resume monitoring BP at home to ensure it is averaging < 130/80.  Follow low sodium diet, reduce stress.  She declines PT referral for likely medial epicondylitis.  She is agreeable to follow up with orthopedic surgery.  Recommend ice, rest.  Return for worsening symptoms.      Next Appointment with me: Visit date not found    Return for Annual physical due 02/18/2024.      VITAL SIGNS     Visit Vitals  /82   Pulse 77   Temp 97.6 °F (36.4 °C)   Ht 157.5 cm (62.01\")   Wt 63 kg (139 lb)   LMP 10/28/2019   SpO2 97%   BMI 25.42 kg/m²             Wt Readings from Last 3 Encounters:   10/03/23 63 kg (139 lb)   02/27/23 61.7 kg (136 lb)   02/17/23 59.9 kg (132 lb)     Body mass index is 25.42 kg/m².        MEDICATIONS AT THE TIME OF OFFICE VISIT     Current Outpatient Medications on File Prior to Visit   Medication Sig Dispense Refill    amLODIPine (NORVASC) 5 MG tablet TAKE 1 TABLET BY MOUTH DAILY 90 tablet 1    atorvastatin (LIPITOR) 20 MG tablet TAKE 1 TABLET BY MOUTH EVERY NIGHT 90 tablet 1    GNP 8 Hour Pain Reliever 650 MG 8 hr tablet TAKE 1 TABLET BY MOUTH EVERY 8 HOURS AS NEEDED FOR PAIN 30 tablet 2    hydroCHLOROthiazide (HYDRODIURIL) 12.5 MG " tablet TAKE 1 TABLET BY MOUTH DAILY 90 tablet 1    icosapent ethyl (VASCEPA) 1 g capsule capsule TAKE 2 CAPSULES BY MOUTH TWICE DAILY WITH MEALS 120 capsule 1    levothyroxine (SYNTHROID, LEVOTHROID) 75 MCG tablet Take 1 tablet by mouth Daily. 75mcg 5 days a week and 88mcg 2 days a week  Indications: Underactive Thyroid 90 tablet 1    levothyroxine (SYNTHROID, LEVOTHROID) 88 MCG tablet Takes 2 days a week along with 75mcg 5 days a week 30 tablet 0    naproxen (NAPROSYN) 500 MG tablet TAKE 1 TABLET BY MOUTH DAILY AS NEEDED FOR HEADACHE. NO MORE THAN 3 PILLS IN A WEEK 9 tablet 5    propranolol (INDERAL) 20 MG tablet Take 1 tablet by mouth 2 (Two) Times a Day. 180 tablet 1     No current facility-administered medications on file prior to visit.        HISTORY OF PRESENT ILLNESS     Annual screening mammogram due in January 2024; scheduled January 25, 2024.    HTN: Today's BP is mildly elevated, 136/82, on HCTZ 25 mg daily, amlodipine 5 mg daily.  She has not monitored BP at home in some time, but it previously averaged 120/80.  No chest pain, shortness of breath, palpitations, lower extremity swelling.      HLD: Remains on atorvastatin 20 mg daily, Vascepa 2 g BID.  February 2023 Lipid panel with triglycerides 158; .  Goal LDL < 70.    Hypothyroidism: Followed by endocrine, taking levothyroxine 75 mcg daily x 5 days, levothyroxine 88 mcg x 2 days.  August 2023 TSH 0.622; Free T4 1.19.    Followed by neurology, Dr. Shahid, for migraine headaches and TIA history.  Prescribed propranolol 20 mg BID PRN as preventative.  She is using naproxen PRN.  Denies any headaches.  Plan to repeat Brain MRI in February 2024.    Right medial elbow pain x 1 month.  No trauma or injury.  Intermittent episodes of pain radiating inferiorly to hand and superiorly to shoulder.  No numbness, tingling, weakness.  She is right handed.  Has tried tendon band without benefit.       Patient Care Team:  Heaven Arnett APRN as PCP - General  (Family Medicine)  Amanda Santiago MD as Consulting Physician (Obstetrics and Gynecology)  Parviz Monteiro MD as Consulting Physician (Endocrinology)    REVIEW OF SYSTEMS     Review of Systems   Constitutional:  Negative for chills, fever and unexpected weight change.   Respiratory:  Negative for cough, chest tightness and shortness of breath.    Cardiovascular:  Negative for chest pain, palpitations and leg swelling.   Neurological:  Negative for dizziness, weakness, light-headedness and headaches.   Psychiatric/Behavioral:  The patient is not nervous/anxious.         PHYSICAL EXAMINATION     Physical Exam  Vitals reviewed.   Constitutional:       General: She is not in acute distress.     Appearance: Normal appearance. She is not ill-appearing, toxic-appearing or diaphoretic.   HENT:      Head: Normocephalic and atraumatic.   Cardiovascular:      Rate and Rhythm: Normal rate and regular rhythm.      Pulses: Normal pulses.      Heart sounds: Normal heart sounds.   Pulmonary:      Effort: Pulmonary effort is normal.      Breath sounds: Normal breath sounds.   Musculoskeletal:      Right elbow: Tenderness present in medial epicondyle.      Right lower leg: No edema.      Left lower leg: No edema.   Neurological:      Mental Status: She is alert and oriented to person, place, and time. Mental status is at baseline.      Sensory: No sensory deficit.      Motor: No weakness.   Psychiatric:         Mood and Affect: Mood normal.         Behavior: Behavior normal.         Thought Content: Thought content normal.         Judgment: Judgment normal.         REVIEWED DATA     Labs:           Imaging:            Medical Tests:           Summary of old records / correspondence / consultant report:           Request outside records:

## 2023-10-18 RX ORDER — LEVOTHYROXINE SODIUM 88 UG/1
TABLET ORAL
Qty: 30 TABLET | Refills: 2 | Status: SHIPPED | OUTPATIENT
Start: 2023-10-18

## 2023-11-06 ENCOUNTER — APPOINTMENT (OUTPATIENT)
Dept: GENERAL RADIOLOGY | Facility: HOSPITAL | Age: 53
End: 2023-11-06
Payer: COMMERCIAL

## 2023-11-06 ENCOUNTER — APPOINTMENT (OUTPATIENT)
Dept: CT IMAGING | Facility: HOSPITAL | Age: 53
End: 2023-11-06
Payer: COMMERCIAL

## 2023-11-06 ENCOUNTER — HOSPITAL ENCOUNTER (EMERGENCY)
Facility: HOSPITAL | Age: 53
Discharge: HOME | End: 2023-11-06
Attending: EMERGENCY MEDICINE | Admitting: EMERGENCY MEDICINE
Payer: COMMERCIAL

## 2023-11-06 ENCOUNTER — TELEPHONE (OUTPATIENT)
Dept: INTERNAL MEDICINE | Age: 53
End: 2023-11-06
Payer: COMMERCIAL

## 2023-11-06 VITALS
SYSTOLIC BLOOD PRESSURE: 125 MMHG | BODY MASS INDEX: 25.4 KG/M2 | RESPIRATION RATE: 18 BRPM | HEIGHT: 62 IN | WEIGHT: 138 LBS | TEMPERATURE: 99 F | DIASTOLIC BLOOD PRESSURE: 80 MMHG | HEART RATE: 106 BPM | OXYGEN SATURATION: 96 %

## 2023-11-06 DIAGNOSIS — R11.0 NAUSEA: Primary | ICD-10-CM

## 2023-11-06 DIAGNOSIS — R53.83 OTHER FATIGUE: ICD-10-CM

## 2023-11-06 DIAGNOSIS — R53.1 WEAKNESS: ICD-10-CM

## 2023-11-06 LAB
ALBUMIN SERPL-MCNC: 4.7 G/DL (ref 3.5–5.2)
ALBUMIN/GLOB SERPL: 1.9 G/DL
ALP SERPL-CCNC: 105 U/L (ref 39–117)
ALT SERPL W P-5'-P-CCNC: 23 U/L (ref 1–33)
ANION GAP SERPL CALCULATED.3IONS-SCNC: 24.9 MMOL/L (ref 5–15)
AST SERPL-CCNC: 17 U/L (ref 1–32)
B PARAPERT DNA SPEC QL NAA+PROBE: NOT DETECTED
B PERT DNA SPEC QL NAA+PROBE: NOT DETECTED
BACTERIA UR QL AUTO: ABNORMAL /HPF
BASOPHILS # BLD AUTO: 0.07 10*3/MM3 (ref 0–0.2)
BASOPHILS NFR BLD AUTO: 0.7 % (ref 0–1.5)
BILIRUB SERPL-MCNC: 0.4 MG/DL (ref 0–1.2)
BILIRUB UR QL STRIP: NEGATIVE
BUN SERPL-MCNC: 11 MG/DL (ref 6–20)
BUN/CREAT SERPL: 18.3 (ref 7–25)
C PNEUM DNA NPH QL NAA+NON-PROBE: NOT DETECTED
CALCIUM SPEC-SCNC: 9 MG/DL (ref 8.6–10.5)
CHLORIDE SERPL-SCNC: 98 MMOL/L (ref 98–107)
CLARITY UR: ABNORMAL
CO2 SERPL-SCNC: 16.1 MMOL/L (ref 22–29)
COLOR UR: YELLOW
CREAT SERPL-MCNC: 0.6 MG/DL (ref 0.57–1)
D DIMER PPP FEU-MCNC: 1.78 MCGFEU/ML (ref 0–0.53)
DEPRECATED RDW RBC AUTO: 44.2 FL (ref 37–54)
EGFRCR SERPLBLD CKD-EPI 2021: 107.5 ML/MIN/1.73
EOSINOPHIL # BLD AUTO: 0.03 10*3/MM3 (ref 0–0.4)
EOSINOPHIL NFR BLD AUTO: 0.3 % (ref 0.3–6.2)
ERYTHROCYTE [DISTWIDTH] IN BLOOD BY AUTOMATED COUNT: 13.2 % (ref 12.3–15.4)
FLUAV SUBTYP SPEC NAA+PROBE: NOT DETECTED
FLUBV RNA ISLT QL NAA+PROBE: NOT DETECTED
GLOBULIN UR ELPH-MCNC: 2.5 GM/DL
GLUCOSE BLDC GLUCOMTR-MCNC: 82 MG/DL (ref 70–130)
GLUCOSE SERPL-MCNC: 79 MG/DL (ref 65–99)
GLUCOSE UR STRIP-MCNC: NEGATIVE MG/DL
HADV DNA SPEC NAA+PROBE: NOT DETECTED
HCOV 229E RNA SPEC QL NAA+PROBE: NOT DETECTED
HCOV HKU1 RNA SPEC QL NAA+PROBE: NOT DETECTED
HCOV NL63 RNA SPEC QL NAA+PROBE: NOT DETECTED
HCOV OC43 RNA SPEC QL NAA+PROBE: NOT DETECTED
HCT VFR BLD AUTO: 41.4 % (ref 34–46.6)
HETEROPH AB SER QL LA: NEGATIVE
HGB BLD-MCNC: 14.3 G/DL (ref 12–15.9)
HGB UR QL STRIP.AUTO: ABNORMAL
HMPV RNA NPH QL NAA+NON-PROBE: NOT DETECTED
HOLD SPECIMEN: NORMAL
HOLD SPECIMEN: NORMAL
HPIV1 RNA ISLT QL NAA+PROBE: NOT DETECTED
HPIV2 RNA SPEC QL NAA+PROBE: NOT DETECTED
HPIV3 RNA NPH QL NAA+PROBE: NOT DETECTED
HPIV4 P GENE NPH QL NAA+PROBE: NOT DETECTED
HYALINE CASTS UR QL AUTO: ABNORMAL /LPF
IMM GRANULOCYTES # BLD AUTO: 0.03 10*3/MM3 (ref 0–0.05)
IMM GRANULOCYTES NFR BLD AUTO: 0.3 % (ref 0–0.5)
KETONES UR QL STRIP: ABNORMAL
LEUKOCYTE ESTERASE UR QL STRIP.AUTO: ABNORMAL
LYMPHOCYTES # BLD AUTO: 2.63 10*3/MM3 (ref 0.7–3.1)
LYMPHOCYTES NFR BLD AUTO: 24.8 % (ref 19.6–45.3)
M PNEUMO IGG SER IA-ACNC: NOT DETECTED
MAGNESIUM SERPL-MCNC: 2.1 MG/DL (ref 1.6–2.6)
MCH RBC QN AUTO: 31.6 PG (ref 26.6–33)
MCHC RBC AUTO-ENTMCNC: 34.5 G/DL (ref 31.5–35.7)
MCV RBC AUTO: 91.4 FL (ref 79–97)
MONOCYTES # BLD AUTO: 0.79 10*3/MM3 (ref 0.1–0.9)
MONOCYTES NFR BLD AUTO: 7.5 % (ref 5–12)
NEUTROPHILS NFR BLD AUTO: 66.4 % (ref 42.7–76)
NEUTROPHILS NFR BLD AUTO: 7.05 10*3/MM3 (ref 1.7–7)
NITRITE UR QL STRIP: NEGATIVE
NRBC BLD AUTO-RTO: 0 /100 WBC (ref 0–0.2)
PH UR STRIP.AUTO: <=5 [PH] (ref 5–8)
PLATELET # BLD AUTO: 386 10*3/MM3 (ref 140–450)
PMV BLD AUTO: 10.4 FL (ref 6–12)
POTASSIUM SERPL-SCNC: 3.7 MMOL/L (ref 3.5–5.2)
PROT SERPL-MCNC: 7.2 G/DL (ref 6–8.5)
PROT UR QL STRIP: ABNORMAL
QT INTERVAL: 336 MS
QTC INTERVAL: 467 MS
RBC # BLD AUTO: 4.53 10*6/MM3 (ref 3.77–5.28)
RBC # UR STRIP: ABNORMAL /HPF
REF LAB TEST METHOD: ABNORMAL
RHINOVIRUS RNA SPEC NAA+PROBE: NOT DETECTED
RSV RNA NPH QL NAA+NON-PROBE: NOT DETECTED
S PYO AG THROAT QL: NEGATIVE
SARS-COV-2 RNA NPH QL NAA+NON-PROBE: NOT DETECTED
SODIUM SERPL-SCNC: 139 MMOL/L (ref 136–145)
SP GR UR STRIP: 1.02 (ref 1–1.03)
SQUAMOUS #/AREA URNS HPF: ABNORMAL /HPF
TROPONIN T SERPL HS-MCNC: 9 NG/L
UROBILINOGEN UR QL STRIP: ABNORMAL
WBC # UR STRIP: ABNORMAL /HPF
WBC NRBC COR # BLD: 10.6 10*3/MM3 (ref 3.4–10.8)
WHOLE BLOOD HOLD COAG: NORMAL
WHOLE BLOOD HOLD SPECIMEN: NORMAL

## 2023-11-06 PROCEDURE — 0202U NFCT DS 22 TRGT SARS-COV-2: CPT

## 2023-11-06 PROCEDURE — 83735 ASSAY OF MAGNESIUM: CPT

## 2023-11-06 PROCEDURE — 25510000001 IOPAMIDOL PER 1 ML: Performed by: EMERGENCY MEDICINE

## 2023-11-06 PROCEDURE — 81001 URINALYSIS AUTO W/SCOPE: CPT | Performed by: EMERGENCY MEDICINE

## 2023-11-06 PROCEDURE — 99285 EMERGENCY DEPT VISIT HI MDM: CPT

## 2023-11-06 PROCEDURE — 87081 CULTURE SCREEN ONLY: CPT

## 2023-11-06 PROCEDURE — 86308 HETEROPHILE ANTIBODY SCREEN: CPT | Performed by: PHYSICIAN ASSISTANT

## 2023-11-06 PROCEDURE — 71275 CT ANGIOGRAPHY CHEST: CPT

## 2023-11-06 PROCEDURE — 93005 ELECTROCARDIOGRAM TRACING: CPT

## 2023-11-06 PROCEDURE — 84484 ASSAY OF TROPONIN QUANT: CPT

## 2023-11-06 PROCEDURE — 25810000003 SODIUM CHLORIDE 0.9 % SOLUTION: Performed by: PHYSICIAN ASSISTANT

## 2023-11-06 PROCEDURE — 93005 ELECTROCARDIOGRAM TRACING: CPT | Performed by: EMERGENCY MEDICINE

## 2023-11-06 PROCEDURE — 85025 COMPLETE CBC W/AUTO DIFF WBC: CPT

## 2023-11-06 PROCEDURE — 82948 REAGENT STRIP/BLOOD GLUCOSE: CPT

## 2023-11-06 PROCEDURE — 85379 FIBRIN DEGRADATION QUANT: CPT | Performed by: PHYSICIAN ASSISTANT

## 2023-11-06 PROCEDURE — 71045 X-RAY EXAM CHEST 1 VIEW: CPT

## 2023-11-06 PROCEDURE — 87880 STREP A ASSAY W/OPTIC: CPT

## 2023-11-06 PROCEDURE — 80053 COMPREHEN METABOLIC PANEL: CPT

## 2023-11-06 RX ORDER — ONDANSETRON 2 MG/ML
4 INJECTION INTRAMUSCULAR; INTRAVENOUS ONCE
Status: DISCONTINUED | OUTPATIENT
Start: 2023-11-06 | End: 2023-11-06

## 2023-11-06 RX ORDER — SODIUM CHLORIDE 0.9 % (FLUSH) 0.9 %
10 SYRINGE (ML) INJECTION AS NEEDED
Status: DISCONTINUED | OUTPATIENT
Start: 2023-11-06 | End: 2023-11-06 | Stop reason: HOSPADM

## 2023-11-06 RX ORDER — ONDANSETRON 4 MG/1
4 TABLET, ORALLY DISINTEGRATING ORAL 4 TIMES DAILY PRN
Qty: 15 TABLET | Refills: 0 | Status: SHIPPED | OUTPATIENT
Start: 2023-11-06

## 2023-11-06 RX ADMIN — SODIUM CHLORIDE 1000 ML: 9 INJECTION, SOLUTION INTRAVENOUS at 18:48

## 2023-11-06 RX ADMIN — IOPAMIDOL 95 ML: 755 INJECTION, SOLUTION INTRAVENOUS at 19:52

## 2023-11-06 NOTE — ED NOTES
"Pt stated two days ago she felt \"like I had the flu\". Pt stated generalized weakness, fatigue, muscle aches, no appetite. Pt has not taken her daily medications (blood pressure).     Pt stated once she tried to get up today she had a sudden onset of dizziness and nausea. Pt stated she was able to ambulate but felt \"light headed and weak\". Pt denies a cough, SOB,CP.   "

## 2023-11-06 NOTE — TELEPHONE ENCOUNTER
PATIENT CALLED AND TRANSFERRED FROM THE HUB.  PATIENT STATES THAT SHE IS FEELING KIND OF  OFF AND CONFUSED WITH NO ENERGY, NOT ABLE TO EAT - NAUSEA AND THAT HER FACE  AND THROAT FEEL LIKE IT IS SWOLLEN.  THE PATIENT IS NOT HAVING TROUBLE BREATHING AND HAS NOT DONE ANYTHING ANY DIFFERENT THAN SHE USUALLY DOES AND THINKS IT COULD BE ANXIETY BECAUSE MOTHER IS IN THE HOSPITAL.  I SUGGESTED AND THE PATIENT AGREED GOING TO THE ER TO GET EVALUATED. PATIENT THEN STATED HER SON WILL TAKE HER ON TO THE ER. PLEASE ADVISE

## 2023-11-06 NOTE — TELEPHONE ENCOUNTER
Assisted Pt. To bedside commode. Pt. Transferred with standby assist. Pt. Not lightheaded or dizzy.    Please call patient.  I agree that she be evaluated in the ER TODAY for concerns of face/ throat swelling.  This could be a sign of a potential allergic reaction, and can rapidly progress to shortness of breath.

## 2023-11-06 NOTE — TELEPHONE ENCOUNTER
CALLED PATIENT BACK AND SHE WAS ON HER WAY TO THE ER. SHE SAID SHE WAS ABOUT 5 MINUTES AWAY AND I ALSO WENT OVER WHAT YOU HAD SAID. I TOLD THE PT TO LET US KNOW IF WE CAN HELP HER WITH ANYTHING ELSE.

## 2023-11-06 NOTE — ED TRIAGE NOTES
"Pt via PV from home with c/o generalized weakness, fatigue, sore throat, \"tongue and face feels swollen\". Pt denies any trouble breathing or swallowing.   "

## 2023-11-06 NOTE — ED PROVIDER NOTES
EMERGENCY DEPARTMENT ENCOUNTER    Room Number:  06/06  Date of encounter:  11/6/2023  PCP: Heaven Arnett APRN  Historian: Patient  Chronic or social conditions impacting care (social determinants of health): Nothing    HPI:  Chief Complaint: Myalgias, weakness, dizziness  A complete HPI/ROS/PMH/PSH/SH/FH are unobtainable due to: Nothing    Context: Macie Sarabia is a 52 y.o. female who presents to the ED c/o 2-day history of generalized fatigue, weakness, nausea, poor appetite, dizziness.  Patient denies any overt fever, chest pain, shortness of breath, abdominal pain.  She denies any vomiting or diarrhea.  Patient states that when she stands up she becomes lightheaded and dizzy.  She has not been eating or drinking anything over the past couple of days.    Review of prior external notes (non-ED):   I reviewed patient's last family medicine office visit from 10/3/2023.  Patient being followed for hypertension, hyperlipidemia.    Review of prior external test results outside of this encounter:  I reviewed a CMP from 10/3/2023.  Creatinine 0.73, potassium 3.8.    PAST MEDICAL HISTORY  Active Ambulatory Problems     Diagnosis Date Noted    Menorrhagia with regular cycle 11/11/2019    Mixed dyslipidemia 02/07/2020    Vitamin D deficiency 04/24/2020    Essential hypertension 04/24/2020    Hypothyroidism due to Hashimoto's thyroiditis 04/24/2020    Symptomatic menopausal or female climacteric states 04/24/2020    Expressive aphasia 05/06/2020    Near syncope 05/06/2020    Chronic fatigue 05/06/2020    Uterine fibroid 08/26/2019    History of IBS 12/01/2000    Constipation 04/01/2017    Acid reflux 04/01/2017    Arthritis of midfoot 10/12/2021    Acquired hallux rigidus, left 10/12/2021    Abnormal mammogram of left breast 02/17/2023     Resolved Ambulatory Problems     Diagnosis Date Noted    No Resolved Ambulatory Problems     Past Medical History:   Diagnosis Date    Abnormal menses     Anemia     Arthritis  05/17/2021    Cancer 2010    Dizziness     Fibroids     GERD (gastroesophageal reflux disease)     Hashimoto's disease     Headache     Hyperlipidemia     Hypertension 2017    Hyperthyroidism     Hypoglycemia 2000    Hypothyroidism     Obesity 05/17/2018         PAST SURGICAL HISTORY  Past Surgical History:   Procedure Laterality Date    COLONOSCOPY N/A 01/31/2020    Procedure: COLONOSCOPY into cecum/terminal ileum;  Surgeon: Scooter Gross MD;  Location: [facility] ENDOSCOPY;  Service: Gastroenterology    ELBOW PROCEDURE Left     AS CHILD    ENDOSCOPY      ENDOSCOPY N/A 01/31/2020    Procedure: ESOPHAGOGASTRODUODENOSCOPY with biopsy;  Surgeon: Scooter Gross MD;  Location: [facility] ENDOSCOPY;  Service: Gastroenterology    FRACTURE SURGERY  1980    Compound fracture left elbow    LAPAROSCOPY WITH LASER      EXPLORATORY    SUBTOTAL HYSTERECTOMY  11/11/2019    TOTAL LAPAROSCOPIC HYSTERECTOMY N/A 11/11/2019    Procedure: TOTAL LAPAROSCOPIC HYSTERECTOMY BILATERAL SALPINGECTOMY;  Surgeon: Amanda Santiago MD;  Location: [facility] MAIN OR;  Service: Gynecology         FAMILY HISTORY  Family History   Adopted: Yes   Problem Relation Age of Onset    Diabetes Child     Thyroid disease Child     Diabetes Son     Hyperlipidemia Son     Hypertension Son     Learning disabilities Son     Mental illness Son     Stroke Son     Thyroid disease Son     Malig Hyperthermia Neg Hx     Breast cancer Neg Hx          SOCIAL HISTORY  Social History     Socioeconomic History    Marital status: Single   Tobacco Use    Smoking status: Never    Smokeless tobacco: Never   Substance and Sexual Activity    Alcohol use: Not Currently     Comment: SOCIALLY    Drug use: No    Sexual activity: Not Currently     Partners: Male     Birth control/protection: Abstinence         ALLERGIES  Lisinopril        REVIEW OF SYSTEMS  All systems reviewed and negative except for those discussed in HPI.       PHYSICAL EXAM    I have reviewed the triage vital signs  and nursing notes.    ED Triage Vitals   Temp Heart Rate Resp BP SpO2   11/06/23 1501 11/06/23 1501 11/06/23 1501 11/06/23 1545 11/06/23 1501   99 °F (37.2 °C) (!) 128 18 (!) 154/104 98 %      Temp src Heart Rate Source Patient Position BP Location FiO2 (%)   11/06/23 1501 -- 11/06/23 1718 -- --   Tympanic  Lying         Physical Exam  GENERAL: Alert, oriented, well-appearing, not distressed  HENT: head atraumatic, no nuchal rigidity  EYES: no scleral icterus, EOMI  CV: regular rhythm, tachycardic rate, no murmur  RESPIRATORY: normal effort, CTA  ABDOMEN: soft, nontender  MUSCULOSKELETAL: no deformity, FROM, no calf swelling or tenderness  NEURO: alert, moves all extremities, follows commands  SKIN: warm, dry        LAB RESULTS  Recent Results (from the past 24 hour(s))   ECG 12 Lead ED Triage Standing Order; Weak / Dizzy / AMS    Collection Time: 11/06/23  3:56 PM   Result Value Ref Range    QT Interval 336 ms    QTC Interval 467 ms   Rapid Strep A Screen - Swab, Throat    Collection Time: 11/06/23  3:58 PM    Specimen: Throat; Swab   Result Value Ref Range    Strep A Ag Negative Negative   Respiratory Panel PCR w/COVID-19(SARS-CoV-2) SHON/BALDO/AALIYAH/PAD/COR/MAD/CIARAN In-House, NP Swab in UTM/VTM, 3-4 HR TAT - Swab, Nasopharynx    Collection Time: 11/06/23  3:58 PM    Specimen: Nasopharynx; Swab   Result Value Ref Range    ADENOVIRUS, PCR Not Detected Not Detected    Coronavirus 229E Not Detected Not Detected    Coronavirus HKU1 Not Detected Not Detected    Coronavirus NL63 Not Detected Not Detected    Coronavirus OC43 Not Detected Not Detected    COVID19 Not Detected Not Detected - Ref. Range    Human Metapneumovirus Not Detected Not Detected    Human Rhinovirus/Enterovirus Not Detected Not Detected    Influenza A PCR Not Detected Not Detected    Influenza B PCR Not Detected Not Detected    Parainfluenza Virus 1 Not Detected Not Detected    Parainfluenza Virus 2 Not Detected Not Detected    Parainfluenza Virus 3 Not  Detected Not Detected    Parainfluenza Virus 4 Not Detected Not Detected    RSV, PCR Not Detected Not Detected    Bordetella pertussis pcr Not Detected Not Detected    Bordetella parapertussis PCR Not Detected Not Detected    Chlamydophila pneumoniae PCR Not Detected Not Detected    Mycoplasma pneumo by PCR Not Detected Not Detected   Green Top (Gel)    Collection Time: 11/06/23  4:51 PM   Result Value Ref Range    Extra Tube Hold for add-ons.    Lavender Top    Collection Time: 11/06/23  4:51 PM   Result Value Ref Range    Extra Tube hold for add-on    Gold Top - SST    Collection Time: 11/06/23  4:51 PM   Result Value Ref Range    Extra Tube Hold for add-ons.    CBC Auto Differential    Collection Time: 11/06/23  4:51 PM    Specimen: Blood   Result Value Ref Range    WBC 10.60 3.40 - 10.80 10*3/mm3    RBC 4.53 3.77 - 5.28 10*6/mm3    Hemoglobin 14.3 12.0 - 15.9 g/dL    Hematocrit 41.4 34.0 - 46.6 %    MCV 91.4 79.0 - 97.0 fL    MCH 31.6 26.6 - 33.0 pg    MCHC 34.5 31.5 - 35.7 g/dL    RDW 13.2 12.3 - 15.4 %    RDW-SD 44.2 37.0 - 54.0 fl    MPV 10.4 6.0 - 12.0 fL    Platelets 386 140 - 450 10*3/mm3    Neutrophil % 66.4 42.7 - 76.0 %    Lymphocyte % 24.8 19.6 - 45.3 %    Monocyte % 7.5 5.0 - 12.0 %    Eosinophil % 0.3 0.3 - 6.2 %    Basophil % 0.7 0.0 - 1.5 %    Immature Grans % 0.3 0.0 - 0.5 %    Neutrophils, Absolute 7.05 (H) 1.70 - 7.00 10*3/mm3    Lymphocytes, Absolute 2.63 0.70 - 3.10 10*3/mm3    Monocytes, Absolute 0.79 0.10 - 0.90 10*3/mm3    Eosinophils, Absolute 0.03 0.00 - 0.40 10*3/mm3    Basophils, Absolute 0.07 0.00 - 0.20 10*3/mm3    Immature Grans, Absolute 0.03 0.00 - 0.05 10*3/mm3    nRBC 0.0 0.0 - 0.2 /100 WBC   Mononucleosis Screen    Collection Time: 11/06/23  4:51 PM    Specimen: Blood   Result Value Ref Range    Monospot Negative Negative   Light Blue Top    Collection Time: 11/06/23  4:52 PM   Result Value Ref Range    Extra Tube Hold for add-ons.    D-dimer, Quantitative    Collection Time:  11/06/23  4:52 PM    Specimen: Blood   Result Value Ref Range    D-Dimer, Quantitative 1.78 (H) 0.00 - 0.53 MCGFEU/mL   POC Glucose Once    Collection Time: 11/06/23  5:17 PM    Specimen: Blood   Result Value Ref Range    Glucose 82 70 - 130 mg/dL   Comprehensive Metabolic Panel    Collection Time: 11/06/23  5:41 PM    Specimen: Blood   Result Value Ref Range    Glucose 79 65 - 99 mg/dL    BUN 11 6 - 20 mg/dL    Creatinine 0.60 0.57 - 1.00 mg/dL    Sodium 139 136 - 145 mmol/L    Potassium 3.7 3.5 - 5.2 mmol/L    Chloride 98 98 - 107 mmol/L    CO2 16.1 (L) 22.0 - 29.0 mmol/L    Calcium 9.0 8.6 - 10.5 mg/dL    Total Protein 7.2 6.0 - 8.5 g/dL    Albumin 4.7 3.5 - 5.2 g/dL    ALT (SGPT) 23 1 - 33 U/L    AST (SGOT) 17 1 - 32 U/L    Alkaline Phosphatase 105 39 - 117 U/L    Total Bilirubin 0.4 0.0 - 1.2 mg/dL    Globulin 2.5 gm/dL    A/G Ratio 1.9 g/dL    BUN/Creatinine Ratio 18.3 7.0 - 25.0    Anion Gap 24.9 (H) 5.0 - 15.0 mmol/L    eGFR 107.5 >60.0 mL/min/1.73   Single High Sensitivity Troponin T    Collection Time: 11/06/23  5:41 PM    Specimen: Blood   Result Value Ref Range    HS Troponin T 9 <10 ng/L   Magnesium    Collection Time: 11/06/23  5:41 PM    Specimen: Blood   Result Value Ref Range    Magnesium 2.1 1.6 - 2.6 mg/dL   Urinalysis With Microscopic If Indicated (No Culture) - Urine, Clean Catch    Collection Time: 11/06/23  6:56 PM    Specimen: Urine, Clean Catch   Result Value Ref Range    Color, UA Yellow Yellow, Straw    Appearance, UA Cloudy (A) Clear    pH, UA <=5.0 5.0 - 8.0    Specific Gravity, UA 1.025 1.005 - 1.030    Glucose, UA Negative Negative    Ketones, UA 80 mg/dL (3+) (A) Negative    Bilirubin, UA Negative Negative    Blood, UA Small (1+) (A) Negative    Protein, UA Trace (A) Negative    Leuk Esterase, UA Small (1+) (A) Negative    Nitrite, UA Negative Negative    Urobilinogen, UA 0.2 E.U./dL 0.2 - 1.0 E.U./dL   Urinalysis, Microscopic Only - Urine, Clean Catch    Collection Time: 11/06/23   6:56 PM    Specimen: Urine, Clean Catch   Result Value Ref Range    RBC, UA 0-2 None Seen, 0-2 /HPF    WBC, UA 21-50 (A) None Seen, 0-2 /HPF    Bacteria, UA None Seen None Seen /HPF    Squamous Epithelial Cells, UA 3-6 (A) None Seen, 0-2 /HPF    Hyaline Casts, UA 7-12 None Seen /LPF    Methodology Automated Microscopy        Ordered the above labs and independently reviewed the results.        RADIOLOGY  CT Angiogram Chest    Result Date: 11/6/2023  CT angiogram of the chest with contrast including reconstruction images 11/6/2023  HISTORY: Shortness of breath.  Following the intravenous contrast injection CT angiography was performed through the chest. Sagittal and coronal reconstruction images were also reviewed.  The pulmonary arterial system is well opacified with no evidence of pulmonary embolus. No aortic aneurysm is seen but no pathologically enlarged lymph nodes are seen. Moderate size hiatal hernia is seen. There is a small low-density right hepatic lobe lesion consistent with a hepatic cyst.  No lung masses or significant pulmonary infiltrates are seen. 3 mm micronodule is seen in the left upper lobe on image 87      1. No evidence of pulmonary embolus. 2. No significant pulmonary infiltrates are seen. Tiny 3 mm micronodule is seen in the left lower lobe.   Radiation dose reduction techniques were utilized, including automated exposure control and exposure modulation based on body size.       XR Chest 1 View    Result Date: 11/6/2023  XR CHEST 1 VW-11/6/2023  HISTORY: Weakness, dizziness.  Heart size is within normal limits. Lungs appear free of acute infiltrates. Retrocardiac soft tissue density containing air is seen likely a moderate size hiatal hernia. Bony structures appear unremarkable.      1. No acute process. 2. Probable hiatal hernia.   This report was finalized on 11/6/2023 4:17 PM by Dr. Rashi Casanova M.D on Workstation: [id number]       I ordered the above noted radiological studies.  Reviewed by me and discussed with radiologist.  See dictation for official radiology interpretation.      MEDICATIONS GIVEN IN ER    Medications   sodium chloride 0.9 % bolus 1,000 mL (0 mL Intravenous Stopped 11/6/23 2054)   iopamidol (ISOVUE-370) 76 % injection 100 mL (95 mL Intravenous Given by Other 11/6/23 1952)         ADDITIONAL ORDERS CONSIDERED BUT NOT ORDERED:  Nothing      PROGRESS, DATA ANALYSIS, CONSULTS, AND MEDICAL DECISION MAKING    All labs have been independently interpreted by myself.  All radiology studies have been independently interpreted by myself and discussed with radiologist dictating the report.   EKG's independently interpreted by myself.  Discussion below represents my analysis of pertinent findings related to patient's condition, differential diagnosis, treatment plan and final disposition.    I have discussed case with Dr. Deleon, emergency room physician.  He has performed his own bedside examination and agrees with treatment plan.    ED Course as of 11/06/23 2354   Mon Nov 06, 2023   1612 Patient complains of generalized weakness, dizziness nausea, myalgias for the past 2 days.  Patient is tachycardic and afebrile here.  Fairly benign physical exam.  Differential diagnoses include but not limited to viral URI, pneumonia, UTI. [EE]   1615 Chest x-ray independently interpreted myself shows no acute infiltrate. [EE]   1838 WBC: 10.60 [EE]   1839 HS Troponin T: 9 [EE]   1839 CO2(!): 16.1 [EE]   1844 D-Dimer, Quant(!): 1.78  Given patient's dizziness and tachycardia we will obtain a CTA. [EE]   1846 EKG independently interpreted myself.  Time 1556.  Sinus tachycardia, rate 116.  Normal P/CHELLY.  QRS normal with normal axis.  Nonspecific T wave changes anteriorly.  This is unchanged versus prior EKG from 1/6/2021. [EE]      ED Course User Index  [EE] Mark Trotter PA       AS OF 23:54 EST VITALS:    BP - 125/80  HR - 106  TEMP - 99 °F (37.2 °C) (Tympanic)  O2 SATS -  96%        DIAGNOSIS  Final diagnoses:   Nausea   Weakness   Other fatigue         DISPOSITION  Discharged      Dictated utilizing Dragon dictation     Mark Trotter PA  11/06/23 7097

## 2023-11-07 NOTE — ED PROVIDER NOTES
MD ATTESTATION NOTE    The NICOLASA and I have discussed this patient's history, physical exam, and treatment plan.  I have reviewed the documentation and personally had a face to face interaction with the patient. I affirm the documentation and agree with the treatment and plan.  The attached note describes my personal findings.      I provided a substantive portion of the care of the patient.  I personally performed the physical exam in its entirety, and below are my findings.       Brief HPI: This patient is a 52-year-old female presenting to the ED today complaining dizziness with associated generalized weakness and nausea.  She reports that the symptoms are made worse by standing from a seated position.  She currently denies chest pain, shortness of breath, nausea/vomiting, or fever/chills.      PHYSICAL EXAM  ED Triage Vitals   Temp Heart Rate Resp BP SpO2   11/06/23 1501 11/06/23 1501 11/06/23 1501 11/06/23 1545 11/06/23 1501   99 °F (37.2 °C) (!) 128 18 (!) 154/104 98 %      Temp src Heart Rate Source Patient Position BP Location FiO2 (%)   11/06/23 1501 -- 11/06/23 1718 -- --   Tympanic  Lying           GENERAL: Resting comfortably and in no acute distress, nontoxic in appearance  HENT: nares patent  EYES: no scleral icterus  CV: regular rhythm, regular tachycardia, no M/R/G  RESPIRATORY: normal effort, lungs clear bilaterally  ABDOMEN: soft, nontender, no rebound or guarding  MUSCULOSKELETAL: no deformity, no edema  NEURO: alert, moves all extremities, follows commands  PSYCH:  calm, cooperative  SKIN: warm, dry    Vital signs and nursing notes reviewed.        Plan: We will fluid hydrate the patient as we obtain labs, urinalysis, as well as a chest x-ray in the ED today.  We will monitor and reassess following.       Sam Deleon MD  11/06/23 7870

## 2023-11-08 LAB — BACTERIA SPEC AEROBE CULT: NORMAL

## 2023-11-11 DIAGNOSIS — I10 ESSENTIAL HYPERTENSION: ICD-10-CM

## 2023-11-13 ENCOUNTER — OFFICE VISIT (OUTPATIENT)
Dept: INTERNAL MEDICINE | Age: 53
End: 2023-11-13
Payer: COMMERCIAL

## 2023-11-13 VITALS
OXYGEN SATURATION: 97 % | SYSTOLIC BLOOD PRESSURE: 142 MMHG | BODY MASS INDEX: 25.4 KG/M2 | HEART RATE: 78 BPM | WEIGHT: 138 LBS | TEMPERATURE: 97.6 F | HEIGHT: 62 IN | DIASTOLIC BLOOD PRESSURE: 88 MMHG

## 2023-11-13 DIAGNOSIS — H61.22 IMPACTED CERUMEN OF LEFT EAR: Primary | ICD-10-CM

## 2023-11-13 DIAGNOSIS — I10 ESSENTIAL HYPERTENSION: ICD-10-CM

## 2023-11-13 PROCEDURE — 99214 OFFICE O/P EST MOD 30 MIN: CPT

## 2023-11-13 RX ORDER — HYDROCHLOROTHIAZIDE 12.5 MG/1
12.5 TABLET ORAL DAILY
Qty: 90 TABLET | Refills: 1 | Status: SHIPPED | OUTPATIENT
Start: 2023-11-13

## 2023-11-13 NOTE — PROGRESS NOTES
"    I N T E R N A L  M E D I C I N E  Heaven Arnett, APRYELITZA    ENCOUNTER DATE:  11/13/2023    Macie Sarabia / 52 y.o. / female      CHIEF COMPLAINT / REASON FOR OFFICE VISIT     Earache      ASSESSMENT & PLAN     Diagnoses and all orders for this visit:    1. Impacted cerumen of left ear (Primary)  -     Ambulatory Referral to ENT (Otolaryngology)    2. Essential hypertension  Overview:  Continue HCTZ 25 mg daily and amlodipine 5 mg daily.           SUMMARY/DISCUSSION  Patient's sore throat and left ear pain symptoms have now resolved.  I do recommend that she visit ENT due to small ear canals for irrigation of left ear impacted cerumen.  Follow up in our office for worsening symptoms.  Recommend that she resume monitoring of her BP at home to ensure it is averaging < 130/80.      Next Appointment with me: 2/20/2024    Return for Next scheduled follow up.      VITAL SIGNS     Visit Vitals  /88   Pulse 78   Temp 97.6 °F (36.4 °C)   Ht 157.5 cm (62.01\")   Wt 62.6 kg (138 lb)   LMP 10/28/2019   SpO2 97%   BMI 25.23 kg/m²             Wt Readings from Last 3 Encounters:   11/13/23 62.6 kg (138 lb)   11/06/23 62.6 kg (138 lb)   10/03/23 63 kg (139 lb)     Body mass index is 25.23 kg/m².        MEDICATIONS AT THE TIME OF OFFICE VISIT     Current Outpatient Medications on File Prior to Visit   Medication Sig Dispense Refill    amLODIPine (NORVASC) 5 MG tablet TAKE 1 TABLET BY MOUTH DAILY 90 tablet 1    atorvastatin (LIPITOR) 20 MG tablet TAKE 1 TABLET BY MOUTH EVERY NIGHT 90 tablet 1    GNP 8 Hour Pain Reliever 650 MG 8 hr tablet TAKE 1 TABLET BY MOUTH EVERY 8 HOURS AS NEEDED FOR PAIN 30 tablet 2    icosapent ethyl (VASCEPA) 1 g capsule capsule TAKE 2 CAPSULES BY MOUTH TWICE DAILY WITH MEALS 120 capsule 1    levothyroxine (SYNTHROID, LEVOTHROID) 75 MCG tablet Take 1 tablet by mouth Daily. 75mcg 5 days a week and 88mcg 2 days a week  Indications: Underactive Thyroid 90 tablet 1    levothyroxine (SYNTHROID, LEVOTHROID) " 88 MCG tablet TAKE 1 TABLET BY MOUTH 2 DAYS A WEEK ALONG WITH THE 75 MCG 5 DAYS A WEEK 30 tablet 2    naproxen (NAPROSYN) 500 MG tablet TAKE 1 TABLET BY MOUTH DAILY AS NEEDED FOR HEADACHE. NO MORE THAN 3 PILLS IN A WEEK 9 tablet 5    ondansetron ODT (ZOFRAN-ODT) 4 MG disintegrating tablet Place 1 tablet on the tongue 4 (Four) Times a Day As Needed for Nausea or Vomiting. 15 tablet 0    propranolol (INDERAL) 20 MG tablet Take 1 tablet by mouth 2 (Two) Times a Day. 180 tablet 1    [DISCONTINUED] hydroCHLOROthiazide (HYDRODIURIL) 12.5 MG tablet TAKE 1 TABLET BY MOUTH DAILY 90 tablet 1    hydroCHLOROthiazide (HYDRODIURIL) 12.5 MG tablet TAKE 1 TABLET BY MOUTH DAILY 90 tablet 1     No current facility-administered medications on file prior to visit.        HISTORY OF PRESENT ILLNESS     HTN: Today's BP is elevated, 142/88, on amlodipine 5 mg daily, HCTZ 12.5 mg daily, propranolol 20 mg BID.  She has not been monitoring at home, but remains without chest pain, dyspnea, lower extremity swelling.      She was recently seen in the ER on 11/06/2023 for dizziness and tachycardia.  CTA was done which ruled out pulmonary embolus.  No significant pulmonary infiltrates were seen.  Tiny 3 mm micro nodule was seen in left lower lobe.      Today she reports a few day history of sore throat with accompanying left ear pain, which have now resolved at the time of today's appointment.  No fever, chills, ear pain, drainage, discharge.  Eating, drinking, urinating well.    Patient Care Team:  Heaven Arnett APRN as PCP - General (Family Medicine)  Amanda Santiago MD as Consulting Physician (Obstetrics and Gynecology)  Parviz Monteiro MD as Consulting Physician (Endocrinology)    REVIEW OF SYSTEMS     Review of Systems   Constitutional:  Negative for chills, fever and unexpected weight change.   HENT:  Positive for ear pain (Left, now resolved) and sore throat (Now resolved).    Respiratory:  Negative for cough, chest tightness and  shortness of breath.    Cardiovascular:  Negative for chest pain, palpitations and leg swelling.   Neurological:  Negative for dizziness, weakness, light-headedness and headaches.   Psychiatric/Behavioral:  The patient is not nervous/anxious.           PHYSICAL EXAMINATION     Physical Exam  Vitals reviewed.   Constitutional:       General: She is not in acute distress.     Appearance: Normal appearance. She is not ill-appearing, toxic-appearing or diaphoretic.   HENT:      Head: Normocephalic and atraumatic.      Right Ear: Tympanic membrane, ear canal and external ear normal. There is no impacted cerumen.      Left Ear: There is impacted cerumen.      Nose: Nose normal. No congestion or rhinorrhea.      Mouth/Throat:      Mouth: Mucous membranes are moist.      Pharynx: Oropharynx is clear. No oropharyngeal exudate or posterior oropharyngeal erythema.   Cardiovascular:      Rate and Rhythm: Normal rate and regular rhythm.      Heart sounds: Normal heart sounds.   Pulmonary:      Effort: Pulmonary effort is normal.      Breath sounds: Normal breath sounds.   Neurological:      Mental Status: She is alert and oriented to person, place, and time. Mental status is at baseline.   Psychiatric:         Mood and Affect: Mood normal.         Behavior: Behavior normal.         Thought Content: Thought content normal.         Judgment: Judgment normal.           REVIEWED DATA     Labs:           Imaging:            Medical Tests:           Summary of old records / correspondence / consultant report:           Request outside records:

## 2023-12-07 DIAGNOSIS — E78.1 HYPERTRIGLYCERIDEMIA: ICD-10-CM

## 2023-12-07 RX ORDER — ICOSAPENT ETHYL 1000 MG/1
CAPSULE ORAL
Qty: 120 CAPSULE | Refills: 1 | Status: SHIPPED | OUTPATIENT
Start: 2023-12-07

## 2023-12-16 DIAGNOSIS — E78.2 MIXED DYSLIPIDEMIA: ICD-10-CM

## 2023-12-18 RX ORDER — ATORVASTATIN CALCIUM 20 MG/1
20 TABLET, FILM COATED ORAL NIGHTLY
Qty: 90 TABLET | Refills: 1 | Status: SHIPPED | OUTPATIENT
Start: 2023-12-18

## 2024-01-09 ENCOUNTER — OFFICE VISIT (OUTPATIENT)
Dept: ENDOCRINOLOGY | Age: 54
End: 2024-01-09
Payer: COMMERCIAL

## 2024-01-09 VITALS
DIASTOLIC BLOOD PRESSURE: 84 MMHG | HEART RATE: 100 BPM | OXYGEN SATURATION: 96 % | HEIGHT: 62 IN | WEIGHT: 140.2 LBS | BODY MASS INDEX: 25.8 KG/M2 | SYSTOLIC BLOOD PRESSURE: 128 MMHG

## 2024-01-09 DIAGNOSIS — E03.9 ACQUIRED HYPOTHYROIDISM: Primary | ICD-10-CM

## 2024-01-09 PROCEDURE — 99213 OFFICE O/P EST LOW 20 MIN: CPT | Performed by: NURSE PRACTITIONER

## 2024-01-09 NOTE — PROGRESS NOTES
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    Weight as of this encounter: 63.6 kg (140 lb 3.2 oz).           Physical Exam  Vitals reviewed.   Constitutional:       General: She is not in acute distress.  HENT:      Head: Normocephalic and atraumatic.   Cardiovascular:      Rate and Rhythm: Normal rate.   Pulmonary:      Effort: Pulmonary effort is normal. No respiratory distress.   Musculoskeletal:         General: No signs of injury. Normal range of motion.      Cervical back: Normal range of motion and neck supple.   Skin:     General: Skin is warm and dry.   Neurological:      Mental Status: She is alert and oriented to person, place, and time. Mental status is at baseline.   Psychiatric:         Mood and Affect: Mood normal.         Behavior: Behavior normal.         Thought Content: Thought content normal.         Judgment: Judgment normal.        Result Review :  The following data was reviewed by: ROBERT Lewis on 01/09/2024:  Common labs          3/7/2023    13:06 10/3/2023    08:03 11/6/2023    16:51 11/6/2023    17:41   Common Labs   Glucose 74  106   79    BUN 16  17   11    Creatinine 0.67  0.73   0.60    Sodium 139  140   139    Potassium 4.0  3.8   3.7    Chloride 101  101   98    Calcium 9.8  10.1   9.0    Total Protein "  6.8      Albumin  4.8   4.7    Total Bilirubin  0.4   0.4    Alkaline Phosphatase  91   105    AST (SGOT)  15   17    ALT (SGPT)  22   23    WBC   10.60     Hemoglobin   14.3     Hematocrit   41.4     Platelets   386     Total Cholesterol  188      Triglycerides  163      HDL Cholesterol  60      LDL Cholesterol   100                     Assessment and Plan   Diagnoses and all orders for this visit:    1. Acquired hypothyroidism (Primary)  -     TSH  -     T4, Free  -     T3, Free             Follow Up   Return in about 1 year (around 1/9/2025).    Ensure thyroid labs stable   Adjust dose as needed based on results     Patient was given instructions and counseling regarding her condition or for health maintenance advice. Please see specific information pulled into the AVS if appropriate.     Maeve Landers APRN

## 2024-01-10 LAB
T3FREE SERPL-MCNC: 3 PG/ML (ref 2–4.4)
T4 FREE SERPL-MCNC: 1.42 NG/DL (ref 0.93–1.7)
TSH SERPL DL<=0.005 MIU/L-ACNC: 1.34 UIU/ML (ref 0.27–4.2)

## 2024-02-06 DIAGNOSIS — E03.9 ACQUIRED HYPOTHYROIDISM: ICD-10-CM

## 2024-02-06 RX ORDER — LEVOTHYROXINE SODIUM 0.07 MG/1
TABLET ORAL
Qty: 90 TABLET | Refills: 1 | Status: SHIPPED | OUTPATIENT
Start: 2024-02-06

## 2024-02-06 NOTE — TELEPHONE ENCOUNTER
Rx Refill Note  Requested Prescriptions     Pending Prescriptions Disp Refills    levothyroxine (SYNTHROID, LEVOTHROID) 75 MCG tablet [Pharmacy Med Name: LEVOTHYROXINE 0.075MG (75MCG) TABS] 90 tablet 1     Sig: TAKE 1 TABLET BY MOUTH EVERY DAY 5 DAYS OF THE WEEK AND 88 MCG ON 2 DAYS OF THE WEEK      Last office visit with prescribing clinician: 1/9/2024   Last telemedicine visit with prescribing clinician: Visit date not found   Next office visit with prescribing clinician: Visit date not found                         Would you like a call back once the refill request has been completed: [] Yes [] No    If the office needs to give you a call back, can they leave a voicemail: [] Yes [] No    Trudy Leonardo  02/06/24, 08:06 EST

## 2024-02-13 DIAGNOSIS — I10 ESSENTIAL HYPERTENSION: ICD-10-CM

## 2024-02-13 RX ORDER — AMLODIPINE BESYLATE 5 MG/1
5 TABLET ORAL DAILY
Qty: 90 TABLET | Refills: 0 | Status: SHIPPED | OUTPATIENT
Start: 2024-02-13

## 2024-03-26 DIAGNOSIS — E78.1 HYPERTRIGLYCERIDEMIA: ICD-10-CM

## 2024-03-26 RX ORDER — ICOSAPENT ETHYL 1000 MG/1
CAPSULE ORAL
Qty: 360 CAPSULE | Refills: 0 | Status: SHIPPED | OUTPATIENT
Start: 2024-03-26 | End: 2024-06-24

## 2024-04-11 ENCOUNTER — OFFICE VISIT (OUTPATIENT)
Dept: INTERNAL MEDICINE | Age: 54
End: 2024-04-11
Payer: COMMERCIAL

## 2024-04-11 VITALS
HEART RATE: 106 BPM | HEIGHT: 62 IN | SYSTOLIC BLOOD PRESSURE: 130 MMHG | BODY MASS INDEX: 25.95 KG/M2 | OXYGEN SATURATION: 97 % | WEIGHT: 141 LBS | TEMPERATURE: 98.3 F | DIASTOLIC BLOOD PRESSURE: 84 MMHG

## 2024-04-11 DIAGNOSIS — R20.0 NUMBNESS AND TINGLING OF BOTH LOWER EXTREMITIES: ICD-10-CM

## 2024-04-11 DIAGNOSIS — G47.9 SLEEP DISTURBANCE: ICD-10-CM

## 2024-04-11 DIAGNOSIS — E78.2 MIXED DYSLIPIDEMIA: ICD-10-CM

## 2024-04-11 DIAGNOSIS — E06.3 HYPOTHYROIDISM DUE TO HASHIMOTO'S THYROIDITIS: ICD-10-CM

## 2024-04-11 DIAGNOSIS — R20.2 NUMBNESS AND TINGLING OF BOTH UPPER EXTREMITIES: ICD-10-CM

## 2024-04-11 DIAGNOSIS — R20.0 NUMBNESS AND TINGLING OF BOTH UPPER EXTREMITIES: ICD-10-CM

## 2024-04-11 DIAGNOSIS — Z00.00 ANNUAL PHYSICAL EXAM: Primary | ICD-10-CM

## 2024-04-11 DIAGNOSIS — R10.13 DYSPEPSIA: ICD-10-CM

## 2024-04-11 DIAGNOSIS — R20.2 NUMBNESS AND TINGLING OF BOTH LOWER EXTREMITIES: ICD-10-CM

## 2024-04-11 DIAGNOSIS — E03.8 HYPOTHYROIDISM DUE TO HASHIMOTO'S THYROIDITIS: ICD-10-CM

## 2024-04-11 DIAGNOSIS — I10 ESSENTIAL HYPERTENSION: ICD-10-CM

## 2024-04-11 DIAGNOSIS — R13.10 DYSPHAGIA, UNSPECIFIED TYPE: ICD-10-CM

## 2024-04-11 LAB
ALBUMIN SERPL-MCNC: 4.8 G/DL (ref 3.5–5.2)
ALBUMIN/GLOB SERPL: 2.2 G/DL
ALP SERPL-CCNC: 110 U/L (ref 39–117)
ALT SERPL-CCNC: 18 U/L (ref 1–33)
AST SERPL-CCNC: 13 U/L (ref 1–32)
BASOPHILS # BLD AUTO: 0.07 10*3/MM3 (ref 0–0.2)
BASOPHILS NFR BLD AUTO: 0.7 % (ref 0–1.5)
BILIRUB SERPL-MCNC: 0.2 MG/DL (ref 0–1.2)
BUN SERPL-MCNC: 10 MG/DL (ref 6–20)
BUN/CREAT SERPL: 12.8 (ref 7–25)
CALCIUM SERPL-MCNC: 9.7 MG/DL (ref 8.6–10.5)
CHLORIDE SERPL-SCNC: 100 MMOL/L (ref 98–107)
CHOLEST SERPL-MCNC: 201 MG/DL (ref 0–200)
CHOLEST/HDLC SERPL: 3.59 {RATIO}
CO2 SERPL-SCNC: 23.9 MMOL/L (ref 22–29)
CREAT SERPL-MCNC: 0.78 MG/DL (ref 0.57–1)
EGFRCR SERPLBLD CKD-EPI 2021: 90.4 ML/MIN/1.73
EOSINOPHIL # BLD AUTO: 0.12 10*3/MM3 (ref 0–0.4)
EOSINOPHIL NFR BLD AUTO: 1.3 % (ref 0.3–6.2)
ERYTHROCYTE [DISTWIDTH] IN BLOOD BY AUTOMATED COUNT: 16.9 % (ref 12.3–15.4)
GLOBULIN SER CALC-MCNC: 2.2 GM/DL
GLUCOSE SERPL-MCNC: 99 MG/DL (ref 65–99)
HBA1C MFR BLD: 5.3 % (ref 4.8–5.6)
HCT VFR BLD AUTO: 35.1 % (ref 34–46.6)
HDLC SERPL-MCNC: 56 MG/DL (ref 40–60)
HGB BLD-MCNC: 11 G/DL (ref 12–15.9)
IMM GRANULOCYTES # BLD AUTO: 0.02 10*3/MM3 (ref 0–0.05)
IMM GRANULOCYTES NFR BLD AUTO: 0.2 % (ref 0–0.5)
LDLC SERPL CALC-MCNC: 90 MG/DL (ref 0–100)
LYMPHOCYTES # BLD AUTO: 2.37 10*3/MM3 (ref 0.7–3.1)
LYMPHOCYTES NFR BLD AUTO: 25.2 % (ref 19.6–45.3)
MCH RBC QN AUTO: 23.7 PG (ref 26.6–33)
MCHC RBC AUTO-ENTMCNC: 31.3 G/DL (ref 31.5–35.7)
MCV RBC AUTO: 75.6 FL (ref 79–97)
MONOCYTES # BLD AUTO: 0.64 10*3/MM3 (ref 0.1–0.9)
MONOCYTES NFR BLD AUTO: 6.8 % (ref 5–12)
NEUTROPHILS # BLD AUTO: 6.2 10*3/MM3 (ref 1.7–7)
NEUTROPHILS NFR BLD AUTO: 65.8 % (ref 42.7–76)
NRBC BLD AUTO-RTO: 0 /100 WBC (ref 0–0.2)
PLATELET # BLD AUTO: 459 10*3/MM3 (ref 140–450)
POTASSIUM SERPL-SCNC: 3.7 MMOL/L (ref 3.5–5.2)
PROT SERPL-MCNC: 7 G/DL (ref 6–8.5)
RBC # BLD AUTO: 4.64 10*6/MM3 (ref 3.77–5.28)
SODIUM SERPL-SCNC: 139 MMOL/L (ref 136–145)
TRIGL SERPL-MCNC: 338 MG/DL (ref 0–150)
UNABLE TO VOID: NORMAL
VIT B12 SERPL-MCNC: 517 PG/ML (ref 211–946)
VLDLC SERPL CALC-MCNC: 55 MG/DL (ref 5–40)
WBC # BLD AUTO: 9.42 10*3/MM3 (ref 3.4–10.8)

## 2024-04-11 RX ORDER — PANTOPRAZOLE SODIUM 40 MG/1
40 TABLET, DELAYED RELEASE ORAL DAILY
Qty: 90 TABLET | Refills: 0 | Status: SHIPPED | OUTPATIENT
Start: 2024-04-11

## 2024-04-11 NOTE — PROGRESS NOTES
"    I N T E R N A L  M E D I C I N E  Heaven Arnett, APRN      ENCOUNTER DATE:  04/11/2024    Macie Larames / 53 y.o. / female      CHIEF COMPLAINT     Annual Exam    HTN: Today's BP is well controlled, 130/84, on HCTZ 25 mg daily, amlodipine 5 mg daily.  Not currently monitoring BP at home.  No chest pain, shortness of breath, palpitations, lower extremity swelling.       HLD: Remains on atorvastatin 20 mg daily, Vascepa 2 g BID.  October 2023 Lipid panel with triglycerides 168; .  Goal LDL < 70.     Hypothyroidism: Followed by endocrine, taking levothyroxine 75 mcg daily x 5 days, levothyroxine 88 mcg x 2 days.  Normal thyroid labs in January 2024.     Followed by neurology, Dr. Shahid, for migraine headaches and TIA history.  Prescribed propranolol 20 mg BID PRN as preventative but has not needed.  She is using naproxen PRN.  Denies any headaches.  Plan to repeat Brain MRI in February 2024 but has not yet completed.      Agreeable to update mammogram.  Aware that she needs to schedule.      Colonoscopy updated in 2020.  Recall 10 years.      Some dyspepsia, dysphagia for the last year associated with solid foods.  No problems with liquids or pills.  Experiencing dyspepsia daily for which she has used Pepcid OTC without significant improvement.  Has had associated episodes of vomiting.      Also with 10 year progressive history of bilateral upper and lower extremity numbness and tingling.  Symptoms are intermittent, but have slowly increased in frequency over the years.      Ongoing difficulty initiating and staying asleep in the last year.  Son diagnosed with sleep apnea.  She does snore.  Reports ongoing daytime fatigue.  No prior sleep study.       VITALS     Visit Vitals  /84   Pulse 106   Temp 98.3 °F (36.8 °C)   Ht 157.5 cm (62.01\")   Wt 64 kg (141 lb)   LMP 10/28/2019   SpO2 97%   BMI 25.78 kg/m²       BP Readings from Last 3 Encounters:   04/11/24 130/84   01/09/24 128/84   11/13/23 142/88 "     Wt Readings from Last 3 Encounters:   04/11/24 64 kg (141 lb)   01/09/24 63.6 kg (140 lb 3.2 oz)   11/13/23 62.6 kg (138 lb)      Body mass index is 25.78 kg/m².       MEDICATIONS     Current Outpatient Medications on File Prior to Visit   Medication Sig Dispense Refill    amLODIPine (NORVASC) 5 MG tablet TAKE 1 TABLET BY MOUTH DAILY 90 tablet 0    atorvastatin (LIPITOR) 20 MG tablet TAKE 1 TABLET BY MOUTH EVERY NIGHT 90 tablet 1    GNP 8 Hour Pain Reliever 650 MG 8 hr tablet TAKE 1 TABLET BY MOUTH EVERY 8 HOURS AS NEEDED FOR PAIN 30 tablet 2    hydroCHLOROthiazide (HYDRODIURIL) 12.5 MG tablet TAKE 1 TABLET BY MOUTH DAILY 90 tablet 1    icosapent ethyl (VASCEPA) 1 g capsule capsule TAKE 2 CAPSULES BY MOUTH TWICE DAILY WITH MEALS 360 capsule 0    levothyroxine (SYNTHROID, LEVOTHROID) 75 MCG tablet TAKE 1 TABLET BY MOUTH EVERY DAY 5 DAYS OF THE WEEK AND 88 MCG ON 2 DAYS OF THE WEEK 90 tablet 1    levothyroxine (SYNTHROID, LEVOTHROID) 88 MCG tablet TAKE 1 TABLET BY MOUTH 2 DAYS A WEEK ALONG WITH THE 75 MCG 5 DAYS A WEEK 30 tablet 2    naproxen (NAPROSYN) 500 MG tablet TAKE 1 TABLET BY MOUTH DAILY AS NEEDED FOR HEADACHE. NO MORE THAN 3 PILLS IN A WEEK 9 tablet 5    ondansetron ODT (ZOFRAN-ODT) 4 MG disintegrating tablet Place 1 tablet on the tongue 4 (Four) Times a Day As Needed for Nausea or Vomiting. 15 tablet 0    propranolol (INDERAL) 20 MG tablet Take 1 tablet by mouth 2 (Two) Times a Day. 180 tablet 1     No current facility-administered medications on file prior to visit.         HISTORY OF PRESENT ILLNESS     Macie presents for annual health maintenance visit.    General health: good  Lifestyle:  Attempting to lose weight?: Yes   Diet: eats moderately healthy  Exercise: exercises occasionally  Tobacco: Never used   Alcohol: does not drink  Work: Full-time  Reproductive health:  Sexually active?: No   Sexual problems?: No problems  Concern for STD?: No    Sees Gynecologist?: No   Lorena/Postmenopausal?: Yes    Domestic abuse concerns: No   Depression Screenin/11/2024     2:16 PM   PHQ-2/PHQ-9 Depression Screening   Little Interest or Pleasure in Doing Things 0-->not at all   Feeling Down, Depressed or Hopeless 0-->not at all   PHQ-9: Brief Depression Severity Measure Score 0         PHQ-2: 0 (Not depressed)   PHQ-9: 0 (Negative screening for depression)    Patient Care Team:  Heaven Arnett APRN as PCP - General (Family Medicine)  Amanda Santiago MD as Consulting Physician (Obstetrics and Gynecology)  Parviz Monteiro MD as Consulting Physician (Endocrinology)      ALLERGIES  Allergies   Allergen Reactions    Lisinopril Angioedema        PFSH:     The following portions of the patient's history were reviewed and updated as appropriate: Allergies / Current Medications / Past Medical History / Surgical History / Social History / Family History    PROBLEM LIST   Patient Active Problem List   Diagnosis    Menorrhagia with regular cycle    Mixed dyslipidemia    Vitamin D deficiency    Essential hypertension    Hypothyroidism due to Hashimoto's thyroiditis    Symptomatic menopausal or female climacteric states    Expressive aphasia    Near syncope    Chronic fatigue    Uterine fibroid    History of IBS    Constipation    Acid reflux    Arthritis of midfoot    Acquired hallux rigidus, left    Abnormal mammogram of left breast       PAST MEDICAL HISTORY  Past Medical History:   Diagnosis Date    Abnormal menses     Anemia     Arthritis 2021    Cancer 2010    Benign Basal cell carcinoma    Dizziness     AT TIMES    Fibroids     GERD (gastroesophageal reflux disease)     Hashimoto's disease     Headache     ? ALLERGIES    Hyperlipidemia     Hypertension 2017    Hyperthyroidism     Hypoglycemia 2000    Hypothyroidism     Obesity 2018    Vitamin D deficiency 2015       SURGICAL HISTORY  Past Surgical History:   Procedure Laterality Date    COLONOSCOPY N/A 2020    Procedure: COLONOSCOPY into  cecum/terminal ileum;  Surgeon: Scooter Gross MD;  Location: [facility] ENDOSCOPY;  Service: Gastroenterology    ELBOW PROCEDURE Left     AS CHILD    ENDOSCOPY      ENDOSCOPY N/A 01/31/2020    Procedure: ESOPHAGOGASTRODUODENOSCOPY with biopsy;  Surgeon: Scooter Gross MD;  Location: [facility] ENDOSCOPY;  Service: Gastroenterology    FRACTURE SURGERY  1980    Compound fracture left elbow    LAPAROSCOPY WITH LASER      EXPLORATORY    SUBTOTAL HYSTERECTOMY  11/11/2019    TONSILLECTOMY  1979    TOTAL LAPAROSCOPIC HYSTERECTOMY N/A 11/11/2019    Procedure: TOTAL LAPAROSCOPIC HYSTERECTOMY BILATERAL SALPINGECTOMY;  Surgeon: Amanda Santiago MD;  Location: [facility] MAIN OR;  Service: Gynecology       SOCIAL HISTORY  Social History     Socioeconomic History    Marital status: Single   Tobacco Use    Smoking status: Never    Smokeless tobacco: Never    Tobacco comments:     Both parents smoked while i was a child   Vaping Use    Vaping status: Never Used   Substance and Sexual Activity    Alcohol use: Not Currently     Comment: SOCIALLY    Drug use: No    Sexual activity: Not Currently     Partners: Male     Birth control/protection: Abstinence, Hysterectomy       FAMILY HISTORY  Family History   Adopted: Yes   Problem Relation Age of Onset    Diabetes Child     Thyroid disease Child     Diabetes Son         pre-diabetic    Hyperlipidemia Son     Hypertension Son     Learning disabilities Son         ADHD , Deslexia    Mental illness Son     Stroke Son         TIA    Thyroid disease Son         Low thyroid since 11 years old    Anxiety disorder Son     Arthritis Son     Depression Son     Developmental Disability Son         Adhd  dexlexia    Heart disease Son         irregular heart    Malig Hyperthermia Neg Hx     Breast cancer Neg Hx        IMMUNIZATION HISTORY  Immunization History   Administered Date(s) Administered    COVID-19 (MODERNA) 1st,2nd,3rd Dose Monovalent 01/01/2021, 01/29/2021, 03/19/2021, 04/16/2021, 12/17/2021     Shingrix 02/23/2020    Tdap 02/17/2023         REVIEW OF SYSTEMS     Review of Systems   Constitutional:  Positive for fatigue. Negative for chills, fever and unexpected weight change.   HENT:  Positive for trouble swallowing (Dysphagia).    Respiratory:  Negative for cough, chest tightness and shortness of breath.    Cardiovascular:  Negative for chest pain, palpitations and leg swelling.   Gastrointestinal:  Positive for vomiting. Negative for abdominal pain, blood in stool, constipation, diarrhea and nausea.        +Dyspepsia   Neurological:  Negative for dizziness, weakness, light-headedness and headaches.   Psychiatric/Behavioral:  Positive for sleep disturbance. The patient is not nervous/anxious.          PHYSICAL EXAMINATION     Physical Exam  Vitals reviewed.   Constitutional:       General: She is not in acute distress.     Appearance: Normal appearance. She is not ill-appearing, toxic-appearing or diaphoretic.   HENT:      Head: Normocephalic and atraumatic.      Right Ear: Tympanic membrane, ear canal and external ear normal. There is no impacted cerumen.      Left Ear: Tympanic membrane, ear canal and external ear normal. There is no impacted cerumen.      Nose: Nose normal. No congestion or rhinorrhea.      Mouth/Throat:      Mouth: Mucous membranes are moist.      Pharynx: Oropharynx is clear. No oropharyngeal exudate or posterior oropharyngeal erythema.   Eyes:      Extraocular Movements: Extraocular movements intact.      Conjunctiva/sclera: Conjunctivae normal.      Pupils: Pupils are equal, round, and reactive to light.   Cardiovascular:      Rate and Rhythm: Normal rate and regular rhythm.      Heart sounds: Normal heart sounds.   Pulmonary:      Effort: Pulmonary effort is normal. No respiratory distress.      Breath sounds: Normal breath sounds.   Abdominal:      General: Bowel sounds are normal.      Palpations: Abdomen is soft.      Tenderness: There is no abdominal tenderness.    Musculoskeletal:         General: Normal range of motion.      Cervical back: Normal range of motion and neck supple.      Right lower leg: No edema.      Left lower leg: No edema.   Lymphadenopathy:      Cervical: No cervical adenopathy.   Skin:     General: Skin is warm and dry.   Neurological:      General: No focal deficit present.      Mental Status: She is alert and oriented to person, place, and time. Mental status is at baseline.   Psychiatric:         Mood and Affect: Mood normal.         Behavior: Behavior normal.         Thought Content: Thought content normal.         Judgment: Judgment normal.         REVIEWED DATA      Labs:    Lab Results   Component Value Date     11/06/2023    K 3.7 11/06/2023    CALCIUM 9.0 11/06/2023    AST 17 11/06/2023    ALT 23 11/06/2023    BUN 11 11/06/2023    CREATININE 0.60 11/06/2023    CREATININE 0.73 10/03/2023    CREATININE 0.67 03/07/2023    EGFRIFNONA 87 05/20/2021    EGFRIFAFRI 93 01/05/2021       Lab Results   Component Value Date    GLUCOSE 79 11/06/2023    HGBA1C 4.80 01/12/2023    HGBA1C 5.20 01/05/2021    HGBA1C 4.95 05/07/2020    TSH 1.340 01/09/2024    FREET4 1.42 01/09/2024       Lab Results   Component Value Date     10/03/2023    HDL 60 10/03/2023    TRIG 163 (H) 10/03/2023    CHOLHDLRATIO 3.13 10/03/2023       Lab Results   Component Value Date    QHTF11CC 26.9 (L) 01/12/2023        Lab Results   Component Value Date    WBC 10.60 11/06/2023    HGB 14.3 11/06/2023    MCV 91.4 11/06/2023     11/06/2023       Lab Results   Component Value Date    PROTEIN Negative 01/12/2023    GLUCOSEU Negative 11/06/2023    BLOODU Small (1+) (A) 11/06/2023    NITRITEU Negative 11/06/2023    LEUKOCYTESUR Small (1+) (A) 11/06/2023          Lab Results   Component Value Date    HEPCVIRUSABY <0.1 01/12/2023       Imaging:        Medical Tests:        ASSESSMENT & PLAN     ANNUAL WELLNESS EXAM / PHYSICAL     Diagnoses and all orders for this visit:    1.  Annual physical exam (Primary)  -     CBC & Differential  -     Hemoglobin A1c  -     Urinalysis With Microscopic If Indicated (No Culture) - Urine, Clean Catch    2. Essential hypertension  Overview:  Continue HCTZ 25 mg daily and amlodipine 5 mg daily.      3. Mixed dyslipidemia  Overview:  Continue atorvastatin 20 mg daily, Vascepa 2 g BID.    Orders:  -     Comprehensive Metabolic Panel  -     Lipid Panel With / Chol / HDL Ratio    4. Hypothyroidism due to Hashimoto's thyroiditis    5. Dyspepsia  -     pantoprazole (PROTONIX) 40 MG EC tablet; Take 1 tablet by mouth Daily.  Dispense: 90 tablet; Refill: 0    6. Dysphagia, unspecified type  -     Ambulatory Referral to General Surgery    7. Numbness and tingling of both lower extremities  -     Vitamin B12    8. Numbness and tingling of both upper extremities  -     EMG & Nerve Conduction Test; Future  -     Vitamin B12    9. Sleep disturbance  -     Ambulatory Referral to Sleep Medicine         Summary/Discussion:     Monitor BP at home to ensure it is averaging < 130/80.  Follow low sodium diet, reduce stress.  Recommend referral to general surgery to undergo EGD evaluation for dyspepsia, dysphagia symptoms.  Start daily pantoprazole.  She is agreeable to provide me with an update on her condition in 4-6 weeks.  Discussed need to taper off medication, and not to stop abruptly.  Recommend EMG evaluation of her chronic likely neuropathy symptoms of bilateral upper and lower extremities.  Review B12 level.   Referral to sleep medicine to rule out BIANCA.  Labs ordered to evaluate other sources of fatigue.    Encouraged follow up with neurology to update brain MRI.      BMI is >= 25 and <30. (Overweight) The following options were offered after discussion;: exercise counseling/recommendations and nutrition counseling/recommendations      Next Appointment with me: Visit date not found    Return for 6 month chronic care, 1 year annual physical.      HEALTHCARE MAINTENANCE  ISSUES     Cancer Screening:  Colon: Initial/Next screening at age: CURRENT  Repeat colon cancer screening: every 10 years  Breast: Recommended monthly self exams; annual professional exam  Mammogram: Overdue (patient will schedule directly)  Cervical: N/A s/p total hysterectomy  Skin: Monthly self skin examination, annual exam by health professional  Lung: Does not meet criteria for lung cancer screening.   Other:    Screening Labs & Tests:  Lab results reviewed & discussed with the patient or test orders placed today.  EKG:  CV Screening: Lipid panel  DEXA (65+ or postmenopausal with risk factors):   HEP C (If born [birth year], or risk factors): Previously had negative screen  Other:     Immunization/Vaccinations (to be given today unless deferred by patient)  Influenza: Recommended annual influenza vaccine  Hepatitis A: Verify immunization records  Hepatitis B: Verify immunization records  Tetanus/Pertussis: Up to date  Pneumovax: Not needed at this time  Shingles: Recommended Shingrix at pharmacy  COVID: Considering the latest booster     Lifestyle Counseling:  Lifestyle Modifications: Attempt to lose weight, Follow a low fat, low cholesterol diet, Maintain low sodium diet (< 3 gm) for blood pressure, Make dinner the lightest meal of day, Improve sleep hygiene, and Reduce exposure to stress if possible  Safety Issues: Always wear seatbelt, Avoid texting while driving   Use sunscreen, regular skin examination  Recommended annual dental/vision examination.  Emotional/Stress/Sleep: Reviewed and  given when appropriate      Health Maintenance   Topic Date Due    BMI FOLLOWUP  01/12/2024    ZOSTER VACCINE (2 of 2) 04/11/2024 (Originally 4/19/2020)    COVID-19 Vaccine (6 - 2023-24 season) 04/13/2024 (Originally 9/1/2023)    INFLUENZA VACCINE  08/01/2024    LIPID PANEL  10/03/2024    MAMMOGRAM  03/02/2025    ANNUAL PHYSICAL  04/11/2025    COLORECTAL CANCER SCREENING  01/31/2030    TDAP/TD VACCINES (2 - Td or Tdap)  02/17/2033    HEPATITIS C SCREENING  Completed    Pneumococcal Vaccine 0-64  Aged Out    PAP SMEAR  Discontinued

## 2024-04-12 DIAGNOSIS — D50.9 MICROCYTIC ANEMIA: Primary | ICD-10-CM

## 2024-04-19 DIAGNOSIS — D50.9 IRON DEFICIENCY ANEMIA, UNSPECIFIED IRON DEFICIENCY ANEMIA TYPE: Primary | ICD-10-CM

## 2024-04-25 ENCOUNTER — OFFICE VISIT (OUTPATIENT)
Dept: SURGERY | Facility: CLINIC | Age: 54
End: 2024-04-25
Payer: COMMERCIAL

## 2024-04-25 VITALS
HEIGHT: 64 IN | DIASTOLIC BLOOD PRESSURE: 88 MMHG | BODY MASS INDEX: 24.01 KG/M2 | WEIGHT: 140.6 LBS | SYSTOLIC BLOOD PRESSURE: 140 MMHG

## 2024-04-25 DIAGNOSIS — D50.0 IRON DEFICIENCY ANEMIA DUE TO CHRONIC BLOOD LOSS: ICD-10-CM

## 2024-04-25 DIAGNOSIS — R13.19 ESOPHAGEAL DYSPHAGIA: Primary | ICD-10-CM

## 2024-04-25 PROCEDURE — 99243 OFF/OP CNSLTJ NEW/EST LOW 30: CPT | Performed by: SURGERY

## 2024-04-25 NOTE — PROGRESS NOTES
Subjective   Macie Sarabia is a 53 y.o. female who presents to the office in surgical consultation from Heaven Arnett APRN for dysphagia and anemia.    History of present illness  The patient has a history of dysphagia that has been present for at least several months.  She states that the first several bites of a meal will often lead to the sensation that the food is getting stuck in her upper throat.  She will often be able to drink water to get the food down.  Sometimes she cannot advance the food and has to cough it up but is then able to eat as long as her bites are small and she drinks plenty of water with them.  She has always been able to manage the symptoms without seeking medical attention.  She did have an EGD by Dr. Gross In 2020 at which time she was found to have esophagitis.  She has not been on routine proton pump inhibitor therapy.  She did have Protonix prescribed about a week ago and she has been taking that but no significant change in her symptoms.  She has some symptoms of GERD but this is minor.    She also has a history of anemia.  She recently had Hemoccult study that was negative.  She had a colonoscopy in 2020 by Dr. Gross that was normal.    Review of Systems   Constitutional:  Negative for chills and fever.   HENT:  Positive for trouble swallowing. Negative for voice change.    Respiratory:  Negative for chest tightness and shortness of breath.    Cardiovascular:  Negative for chest pain and palpitations.   Gastrointestinal:  Negative for abdominal pain and nausea.   Psychiatric/Behavioral:  Negative for agitation and confusion.      Past Medical History:   Diagnosis Date    Abnormal menses     Anemia     Arthritis 05/17/2021    Cancer 2010    Benign Basal cell carcinoma    Dizziness     AT TIMES    Fibroids     GERD (gastroesophageal reflux disease)     Hashimoto's disease     Headache     ? ALLERGIES    Hyperlipidemia     Hypertension 2017    Hyperthyroidism     Hypoglycemia 2000     Hypothyroidism     Obesity 05/17/2018    Vitamin D deficiency 2015     Past Surgical History:   Procedure Laterality Date    COLONOSCOPY N/A 01/31/2020    Procedure: COLONOSCOPY into cecum/terminal ileum;  Surgeon: Scooter Gross MD;  Location: [facility] ENDOSCOPY;  Service: Gastroenterology    ELBOW PROCEDURE Left     AS CHILD    ENDOSCOPY      ENDOSCOPY N/A 01/31/2020    Procedure: ESOPHAGOGASTRODUODENOSCOPY with biopsy;  Surgeon: Scooter Gross MD;  Location: [facility] ENDOSCOPY;  Service: Gastroenterology    FRACTURE SURGERY  1980    Compound fracture left elbow    LAPAROSCOPY WITH LASER      EXPLORATORY    SUBTOTAL HYSTERECTOMY  11/11/2019    TONSILLECTOMY  1979    TOTAL LAPAROSCOPIC HYSTERECTOMY N/A 11/11/2019    Procedure: TOTAL LAPAROSCOPIC HYSTERECTOMY BILATERAL SALPINGECTOMY;  Surgeon: Amanda Santiago MD;  Location: [facility] MAIN OR;  Service: Gynecology     Family History   Adopted: Yes   Problem Relation Age of Onset    Diabetes Child     Thyroid disease Child     Diabetes Son         pre-diabetic    Hyperlipidemia Son     Hypertension Son     Learning disabilities Son         ADHD , Deslexia    Mental illness Son     Stroke Son         TIA    Thyroid disease Son         Low thyroid since 11 years old    Anxiety disorder Son     Arthritis Son     Depression Son     Developmental Disability Son         Adhd  dexlexia    Heart disease Son         irregular heart    Malig Hyperthermia Neg Hx     Breast cancer Neg Hx      Social History     Socioeconomic History    Marital status: Single   Tobacco Use    Smoking status: Never    Smokeless tobacco: Never    Tobacco comments:     Both parents smoked while i was a child   Vaping Use    Vaping status: Never Used   Substance and Sexual Activity    Alcohol use: Not Currently     Comment: SOCIALLY    Drug use: No    Sexual activity: Not Currently     Partners: Male     Birth control/protection: Abstinence, Hysterectomy       Objective   Physical  Exam  Constitutional:       Appearance: She is not ill-appearing or toxic-appearing.   Eyes:      General: No scleral icterus.     Extraocular Movements: Extraocular movements intact.   Pulmonary:      Effort: Pulmonary effort is normal. No respiratory distress.   Neurological:      Mental Status: She is alert.   Psychiatric:         Behavior: Behavior is cooperative.       BMI is within normal parameters. No other follow-up for BMI required.      Assessment & Plan     1.  Dysphagia: The patient has dysphagia that appears to be related to esophageal disease.  She was seen by Dr. Gross who performed an EGD in 2020 and found active esophagitis.  She is currently on proton pump inhibitor therapy and she was advised to continue that therapy.  I suspect she has developed a stricture related to the esophagitis.  This may need dilatation.  I will refer her back to Dr. Gross who performs EGDs with dilatations.    2.  Anemia: The patient has ongoing anemia and had an EGD and colonoscopy in 2020.  She will need a repeat EGD based on her symptoms of dysphagia and may need a colonoscopy as well.  She had a normal colonoscopy in 2020 and has no significant GI symptoms but the anemia will require evaluation.  Since I am sending her back to Dr. Gross, I will contact him and make him aware of her situation including the anemia.    Addendum:  I corresponded with Dr. Gross who is aware of the patient.

## 2024-04-25 NOTE — LETTER
April 25, 2024     Heaven Arnett APRN     Patient: Macie Sarabia   YOB: 1970   Date of Visit: 4/25/2024     Dear ROBERT Bassett:       Thank you for referring Macie Sarabia to me for evaluation. Below are the relevant portions of my assessment and plan of care.    If you have questions, please do not hesitate to call me. I look forward to following Macie along with you.         Sincerely,        Bhaskar Roberto Jr., MD        CC:   No Recipients    Bhaskar Roberto Jr., MD  04/25/24 1537  Sign when Signing Visit  Subjective  Macie Sarabia is a 53 y.o. female who presents to the office in surgical consultation from Heaven Arnett APRN for dysphagia and anemia.    History of present illness  The patient has a history of dysphagia that has been present for at least several months.  She states that the first several bites of a meal will often lead to the sensation that the food is getting stuck in her upper throat.  She will often be able to drink water to get the food down.  Sometimes she cannot advance the food and has to cough it up but is then able to eat as long as her bites are small and she drinks plenty of water with them.  She has always been able to manage the symptoms without seeking medical attention.  She did have an EGD by Dr. Gross In 2020 at which time she was found to have esophagitis.  She has not been on routine proton pump inhibitor therapy.  She did have Protonix prescribed about a week ago and she has been taking that but no significant change in her symptoms.  She has some symptoms of GERD but this is minor.    She also has a history of anemia.  She recently had Hemoccult study that was negative.  She had a colonoscopy in 2020 by Dr. Gross that was normal.    Review of Systems   Constitutional:  Negative for chills and fever.   HENT:  Positive for trouble swallowing. Negative for voice change.    Respiratory:  Negative for chest tightness and shortness of breath.     Cardiovascular:  Negative for chest pain and palpitations.   Gastrointestinal:  Negative for abdominal pain and nausea.   Psychiatric/Behavioral:  Negative for agitation and confusion.      Past Medical History:   Diagnosis Date   • Abnormal menses    • Anemia    • Arthritis 05/17/2021   • Cancer 2010    Benign Basal cell carcinoma   • Dizziness     AT TIMES   • Fibroids    • GERD (gastroesophageal reflux disease)    • Hashimoto's disease    • Headache     ? ALLERGIES   • Hyperlipidemia    • Hypertension 2017   • Hyperthyroidism    • Hypoglycemia 2000   • Hypothyroidism    • Obesity 05/17/2018   • Vitamin D deficiency 2015     Past Surgical History:   Procedure Laterality Date   • COLONOSCOPY N/A 01/31/2020    Procedure: COLONOSCOPY into cecum/terminal ileum;  Surgeon: Scooter Gross MD;  Location: [facility] ENDOSCOPY;  Service: Gastroenterology   • ELBOW PROCEDURE Left     AS CHILD   • ENDOSCOPY     • ENDOSCOPY N/A 01/31/2020    Procedure: ESOPHAGOGASTRODUODENOSCOPY with biopsy;  Surgeon: Scooter Gross MD;  Location: [facility] ENDOSCOPY;  Service: Gastroenterology   • FRACTURE SURGERY  1980    Compound fracture left elbow   • LAPAROSCOPY WITH LASER      EXPLORATORY   • SUBTOTAL HYSTERECTOMY  11/11/2019   • TONSILLECTOMY  1979   • TOTAL LAPAROSCOPIC HYSTERECTOMY N/A 11/11/2019    Procedure: TOTAL LAPAROSCOPIC HYSTERECTOMY BILATERAL SALPINGECTOMY;  Surgeon: Amanda Santiago MD;  Location: [facility] MAIN OR;  Service: Gynecology     Family History   Adopted: Yes   Problem Relation Age of Onset   • Diabetes Child    • Thyroid disease Child    • Diabetes Son         pre-diabetic   • Hyperlipidemia Son    • Hypertension Son    • Learning disabilities Son         ADHD , Deslexia   • Mental illness Son    • Stroke Son         TIA   • Thyroid disease Son         Low thyroid since 11 years old   • Anxiety disorder Son    • Arthritis Son    • Depression Son    • Developmental Disability Son         Adhd  dexlexia   • Heart  disease Son         irregular heart   • Malig Hyperthermia Neg Hx    • Breast cancer Neg Hx      Social History     Socioeconomic History   • Marital status: Single   Tobacco Use   • Smoking status: Never   • Smokeless tobacco: Never   • Tobacco comments:     Both parents smoked while i was a child   Vaping Use   • Vaping status: Never Used   Substance and Sexual Activity   • Alcohol use: Not Currently     Comment: SOCIALLY   • Drug use: No   • Sexual activity: Not Currently     Partners: Male     Birth control/protection: Abstinence, Hysterectomy       Objective  Physical Exam  Constitutional:       Appearance: She is not ill-appearing or toxic-appearing.   Eyes:      General: No scleral icterus.     Extraocular Movements: Extraocular movements intact.   Pulmonary:      Effort: Pulmonary effort is normal. No respiratory distress.   Neurological:      Mental Status: She is alert.   Psychiatric:         Behavior: Behavior is cooperative.       BMI is within normal parameters. No other follow-up for BMI required.      Assessment & Plan    1.  Dysphagia: The patient has dysphagia that appears to be related to esophageal disease.  She was seen by Dr. Gross who performed an EGD in 2020 and found active esophagitis.  She is currently on proton pump inhibitor therapy and she was advised to continue that therapy.  I suspect she has developed a stricture related to the esophagitis.  This may need dilatation.  I will refer her back to Dr. Gross who performs EGDs with dilatations.    2.  Anemia: The patient has ongoing anemia and had an EGD and colonoscopy in 2020.  She will need a repeat EGD based on her symptoms of dysphagia and may need a colonoscopy as well.  She had a normal colonoscopy in 2020 and has no significant GI symptoms but the anemia will require evaluation.  Since I am sending her back to Dr. Gross, I will contact him and make him aware of her situation including the anemia.    Addendum:  I corresponded with   Ginny who is aware of the patient.

## 2024-04-27 ENCOUNTER — PATIENT ROUNDING (BHMG ONLY) (OUTPATIENT)
Age: 54
End: 2024-04-27
Payer: COMMERCIAL

## 2024-04-27 NOTE — ED NOTES
Thank you for letting us care for you in your recent visit to our [facility] urgent care center. We would love to hear about your experience with us. Was this the first time you have visited our location?    We’re always looking for ways to make our patients’ experiences even better. Do you have any recommendations on ways we may improve?     I appreciate you taking the time to respond. Please be on the lookout for a survey about your recent visit from [facility] via text or email. We would greatly appreciate if you could fill that out and turn it back in. We want your voice to be heard and we value your feedback.   Thank you for choosing [facility] for your healthcare needs.     If you have concerns or would like to speak to me in person regarding your visit please feel free to give me a call. [phone]    Hope you get well soon and thank you.    Jackson Myers LPN Practice Manager

## 2024-05-06 ENCOUNTER — OFFICE (OUTPATIENT)
Dept: URBAN - METROPOLITAN AREA CLINIC 66 | Facility: CLINIC | Age: 54
End: 2024-05-06
Payer: COMMERCIAL

## 2024-05-06 VITALS
DIASTOLIC BLOOD PRESSURE: 99 MMHG | SYSTOLIC BLOOD PRESSURE: 162 MMHG | HEIGHT: 62 IN | WEIGHT: 143 LBS | HEART RATE: 99 BPM

## 2024-05-06 DIAGNOSIS — R11.0 NAUSEA: ICD-10-CM

## 2024-05-06 DIAGNOSIS — R19.4 CHANGE IN BOWEL HABIT: ICD-10-CM

## 2024-05-06 DIAGNOSIS — D50.9 IRON DEFICIENCY ANEMIA, UNSPECIFIED: ICD-10-CM

## 2024-05-06 DIAGNOSIS — K21.9 GASTRO-ESOPHAGEAL REFLUX DISEASE WITHOUT ESOPHAGITIS: ICD-10-CM

## 2024-05-06 DIAGNOSIS — R13.10 DYSPHAGIA, UNSPECIFIED: ICD-10-CM

## 2024-05-06 DIAGNOSIS — K62.5 HEMORRHAGE OF ANUS AND RECTUM: ICD-10-CM

## 2024-05-06 PROCEDURE — 99204 OFFICE O/P NEW MOD 45 MIN: CPT | Performed by: NURSE PRACTITIONER

## 2024-05-21 ENCOUNTER — OFFICE VISIT (OUTPATIENT)
Dept: SLEEP MEDICINE | Facility: HOSPITAL | Age: 54
End: 2024-05-21
Payer: COMMERCIAL

## 2024-05-21 VITALS — HEART RATE: 100 BPM | OXYGEN SATURATION: 97 % | HEIGHT: 64 IN | BODY MASS INDEX: 24.48 KG/M2 | WEIGHT: 143.4 LBS

## 2024-05-21 DIAGNOSIS — G47.9 SLEEP DISTURBANCE: Primary | ICD-10-CM

## 2024-05-21 DIAGNOSIS — G47.33 OSA (OBSTRUCTIVE SLEEP APNEA): ICD-10-CM

## 2024-05-21 DIAGNOSIS — G25.81 RESTLESS LEGS SYNDROME (RLS): ICD-10-CM

## 2024-05-21 DIAGNOSIS — F51.04 PSYCHOPHYSIOLOGICAL INSOMNIA: ICD-10-CM

## 2024-05-21 DIAGNOSIS — I10 ESSENTIAL HYPERTENSION: ICD-10-CM

## 2024-05-21 PROCEDURE — G0463 HOSPITAL OUTPT CLINIC VISIT: HCPCS

## 2024-05-21 NOTE — PROGRESS NOTES
Reason for Consultation: Insomnia        Patient Care Team:  Heaven Arnett APRN as PCP - General (Family Medicine)  Amanda Santiago MD as Consulting Physician (Obstetrics and Gynecology)  Parviz Monteiro MD as Consulting Physician (Endocrinology)  Mlies Barrios MD, MPH as Consulting Physician (Sleep Medicine)      History of present illness:    Thank you for asking me to see your patient.  The patient is a 53 y.o. female has had trouble staying asleep and feeling exhausted when she gets up.  She has been told that she might snore although no one sleeps in her bedroom presently.  She works from home and goes to bed at 9 PM and gets up 4 to 6 AM and estimates she is only slept 5 to 6 hours but is exhausted on the weekends she goes to bed about 2 hours later and gets up at 7 AM and still feels exhausted.  Just does not have the energy that she used to have.  She probably gained 15 pounds in the past 5 years.  She has felt drowsy when driving she does have restless leg symptoms sometimes and is bothered by them when she tries to sleep that covers are quite messy when she wakes up and sleeping longer results and her feeling more tired.  She does observe her however that she sometimes sleeps better when she is away from home.    Elysian: 8    Data Reviewed: Reviewed her sleep questionnaire and medical chart.      PMH:  Past Medical History:   Diagnosis Date    Abnormal menses     Anemia     Arthritis 05/17/2021    Cancer 2010    Benign Basal cell carcinoma    Dizziness     AT TIMES    Fibroids     GERD (gastroesophageal reflux disease)     Hashimoto's disease     Headache     ? ALLERGIES    Hyperlipidemia     Hypertension 2017    Hyperthyroidism     Hypoglycemia 2000    Hypothyroidism     Obesity 05/17/2018    Vitamin D deficiency 2015          Allergies:  Lisinopril     Medication Review:   Current Outpatient Medications on File Prior to Visit   Medication Sig Dispense Refill    amLODIPine (NORVASC) 5 MG tablet  "TAKE 1 TABLET BY MOUTH DAILY 90 tablet 0    atorvastatin (LIPITOR) 20 MG tablet TAKE 1 TABLET BY MOUTH EVERY NIGHT 90 tablet 1    GNP 8 Hour Pain Reliever 650 MG 8 hr tablet TAKE 1 TABLET BY MOUTH EVERY 8 HOURS AS NEEDED FOR PAIN 30 tablet 2    hydroCHLOROthiazide (HYDRODIURIL) 12.5 MG tablet TAKE 1 TABLET BY MOUTH DAILY 90 tablet 1    hydrOXYzine pamoate (VISTARIL) 50 MG capsule Take 1 capsule by mouth 3 (Three) Times a Day As Needed for Allergies (allergic reaction) for up to 5 days. 15 capsule 0    icosapent ethyl (VASCEPA) 1 g capsule capsule TAKE 2 CAPSULES BY MOUTH TWICE DAILY WITH MEALS 360 capsule 0    levothyroxine (SYNTHROID, LEVOTHROID) 75 MCG tablet TAKE 1 TABLET BY MOUTH EVERY DAY 5 DAYS OF THE WEEK AND 88 MCG ON 2 DAYS OF THE WEEK 90 tablet 1    levothyroxine (SYNTHROID, LEVOTHROID) 88 MCG tablet TAKE 1 TABLET BY MOUTH 2 DAYS A WEEK ALONG WITH THE 75 MCG 5 DAYS A WEEK 30 tablet 2    naproxen (NAPROSYN) 500 MG tablet TAKE 1 TABLET BY MOUTH DAILY AS NEEDED FOR HEADACHE. NO MORE THAN 3 PILLS IN A WEEK 9 tablet 5    ondansetron ODT (ZOFRAN-ODT) 4 MG disintegrating tablet Place 1 tablet on the tongue 4 (Four) Times a Day As Needed for Nausea or Vomiting. 15 tablet 0    pantoprazole (PROTONIX) 40 MG EC tablet Take 1 tablet by mouth Daily. 90 tablet 0     No current facility-administered medications on file prior to visit.         Vital Signs:    Vitals:    05/21/24 1425   Pulse: 100   SpO2: 97%   Weight: 65 kg (143 lb 6.4 oz)   Height: 162.6 cm (64\")        Body mass index is 24.61 kg/m².  Neck Circumference: 15 inches      Physical Exam:    Constitutional:  Well developed 53 y.o. female that appears in no apparent distress.  Awake & oriented times 3.  Normal mood with normal recent and remote memory and normal judgement.  Eyes:  Conjunctivae normal.  Oropharynx: Moist mucous membranes without exudate and Mallampati 4  Neck: Trachea midline  Respiratory: Effort is not labored  Cardiovascular: Radial pulse " regular  Musculoskeletal: Gait appears normal, no digital clubbing evident, no pre-tibial edema        Impression:   Encounter Diagnoses   Name Primary?    Sleep disturbance Yes    Essential hypertension     Psychophysiological insomnia     Restless legs syndrome (RLS)      Patient's BMI is Body mass index is 24.61 kg/m².    I am concerned that this patient has sleep apnea as a cause for some of her insomnia but I also think she likely has psychophysiologic insomnia.    Plan:    Go to do an in lab study to see if she has sleep apnea or anything else that interferes with her sleep.  She has sleep apnea and we addressed that and she will has insomnia that I want make referral to Dr. Dawson for psychophysiologic insomnia.    The patient should practice good sleep hygiene measures.        Pathophysiology of BIANCA described to the patient.  Cardiovascular complications of untreated BIANCA also reviewed.      The patient was cautioned about the dangers of drowsy driving.    Bart Barrios MD  Sleep Medicine  05/21/24  14:44 EDT

## 2024-05-23 DIAGNOSIS — I10 ESSENTIAL HYPERTENSION: ICD-10-CM

## 2024-05-23 RX ORDER — HYDROCHLOROTHIAZIDE 12.5 MG/1
12.5 TABLET ORAL DAILY
Qty: 90 TABLET | Refills: 1 | Status: SHIPPED | OUTPATIENT
Start: 2024-05-23

## 2024-05-23 RX ORDER — AMLODIPINE BESYLATE 5 MG/1
5 TABLET ORAL DAILY
Qty: 90 TABLET | Refills: 1 | Status: SHIPPED | OUTPATIENT
Start: 2024-05-23

## 2024-06-12 DIAGNOSIS — E78.1 HYPERTRIGLYCERIDEMIA: ICD-10-CM

## 2024-06-12 RX ORDER — ICOSAPENT ETHYL 1 G/1
CAPSULE ORAL
Qty: 360 CAPSULE | Refills: 0 | Status: SHIPPED | OUTPATIENT
Start: 2024-06-12 | End: 2024-09-10

## 2024-06-22 DIAGNOSIS — E78.2 MIXED DYSLIPIDEMIA: ICD-10-CM

## 2024-06-24 RX ORDER — ATORVASTATIN CALCIUM 20 MG/1
20 TABLET, FILM COATED ORAL NIGHTLY
Qty: 90 TABLET | Refills: 1 | Status: SHIPPED | OUTPATIENT
Start: 2024-06-24

## 2024-07-12 DIAGNOSIS — R10.13 DYSPEPSIA: ICD-10-CM

## 2024-07-12 RX ORDER — PANTOPRAZOLE SODIUM 40 MG/1
40 TABLET, DELAYED RELEASE ORAL DAILY
Qty: 90 TABLET | Refills: 1 | Status: SHIPPED | OUTPATIENT
Start: 2024-07-12

## 2024-08-26 ENCOUNTER — OFFICE (OUTPATIENT)
Age: 54
End: 2024-08-26

## 2024-08-26 ENCOUNTER — OFFICE (OUTPATIENT)
Dept: URBAN - METROPOLITAN AREA CLINIC 76 | Facility: CLINIC | Age: 54
End: 2024-08-26

## 2024-09-18 RX ORDER — LEVOTHYROXINE SODIUM 88 UG/1
TABLET ORAL
Qty: 30 TABLET | Refills: 2 | Status: SHIPPED | OUTPATIENT
Start: 2024-09-18

## 2024-10-26 DIAGNOSIS — E03.9 ACQUIRED HYPOTHYROIDISM: ICD-10-CM

## 2024-10-26 DIAGNOSIS — R10.13 DYSPEPSIA: ICD-10-CM

## 2024-10-26 RX ORDER — PANTOPRAZOLE SODIUM 40 MG/1
40 TABLET, DELAYED RELEASE ORAL DAILY
Qty: 90 TABLET | Refills: 1 | Status: SHIPPED | OUTPATIENT
Start: 2024-10-26

## 2024-10-28 RX ORDER — LEVOTHYROXINE SODIUM 75 UG/1
TABLET ORAL
Qty: 90 TABLET | Refills: 0 | Status: SHIPPED | OUTPATIENT
Start: 2024-10-28

## 2024-10-28 NOTE — TELEPHONE ENCOUNTER
Rx Refill Note  Requested Prescriptions     Pending Prescriptions Disp Refills    levothyroxine (SYNTHROID, LEVOTHROID) 75 MCG tablet [Pharmacy Med Name: LEVOTHYROXINE 0.075MG (75MCG) TABS] 90 tablet 1     Sig: TAKE 1 TABLET BY MOUTH EVERY DAY 5 DAYS OF THE WEEK AND 88 MCG ON 2 DAYS OF THE WEEK      Last office visit with prescribing clinician: 1/9/2024   Last telemedicine visit with prescribing clinician: Visit date not found   Next office visit with prescribing clinician: Visit date not found                         Would you like a call back once the refill request has been completed: [] Yes [] No    If the office needs to give you a call back, can they leave a voicemail: [] Yes [] No    Vee Hunter MA  10/28/24, 08:56 EDT

## 2025-04-10 ENCOUNTER — TELEPHONE (OUTPATIENT)
Dept: INTERNAL MEDICINE | Age: 55
End: 2025-04-10
Payer: COMMERCIAL

## 2025-04-10 NOTE — TELEPHONE ENCOUNTER
CALLED PT 4X TO RESCHEDULE HER APPT ON 4/16 WITH SALLIE NEW TO CALL BACK ALSO SENT MASSAGE THROUGH MY CHART.

## (undated) DEVICE — ADHS SKIN DERMABOND TOP ADVANCED

## (undated) DEVICE — HARMONIC ACE +7 LAPAROSCOPIC SHEARS ADVANCED HEMOSTASIS 5MM DIAMETER 36CM SHAFT LENGTH  FOR USE WITH GRAY HAND PIECE ONLY: Brand: HARMONIC ACE

## (undated) DEVICE — LAPAROSCOPIC SMOKE ELIMINATION DEVICE: Brand: PNEUVIEW XE

## (undated) DEVICE — GLV SURG BIOGEL LTX PF 6 1/2

## (undated) DEVICE — BITEBLOCK OMNI BLOC

## (undated) DEVICE — TUBING, SUCTION, 1/4" X 10', STRAIGHT: Brand: MEDLINE

## (undated) DEVICE — SUT VIC 0 CT1 36IN J946H

## (undated) DEVICE — SENSR O2 OXIMAX FNGR A/ 18IN NONSTR

## (undated) DEVICE — TOTAL TRAY, 16FR 10ML SIL FOLEY, URN: Brand: MEDLINE

## (undated) DEVICE — SOL NACL 0.9PCT 1000ML

## (undated) DEVICE — GLV SURG TRIUMPH CLASSIC PF LTX 7 STRL

## (undated) DEVICE — LN SMPL CO2 SHTRM SD STREAM W/M LUER

## (undated) DEVICE — ENDOCUT SCISSOR TIP, DISPOSABLE: Brand: RENEW

## (undated) DEVICE — LOU GYN LAPAROSCOPY: Brand: MEDLINE INDUSTRIES, INC.

## (undated) DEVICE — EXTENDED YANKAUR BULB TIP INSTRUMENT: Brand: VITAL VUE

## (undated) DEVICE — DEV COND GAS LAP INSUFLOW W/LUER CONN

## (undated) DEVICE — FRCP BX RADJAW4 NDL 2.8 240CM LG OG BX40

## (undated) DEVICE — IRRIGATOR BULB ASEPTO 60CC STRL

## (undated) DEVICE — KTTNER ENDO BLNT DISSCT

## (undated) DEVICE — THE TORRENT IRRIGATION SCOPE CONNECTOR IS USED WITH THE TORRENT IRRIGATION TUBING TO PROVIDE IRRIGATION FLUIDS SUCH AS STERILE WATER DURING GASTROINTESTINAL ENDOSCOPIC PROCEDURES WHEN USED IN CONJUNCTION WITH AN IRRIGATION PUMP (OR ELECTROSURGICAL UNIT).: Brand: TORRENT

## (undated) DEVICE — TROCAR: Brand: KII SLEEVE

## (undated) DEVICE — ENDOPATH XCEL BLADELESS TROCARS WITH STABILITY SLEEVES: Brand: ENDOPATH XCEL

## (undated) DEVICE — MSK PROC CURAPLEX O2 2/ADAPT 7FT

## (undated) DEVICE — TROCAR: Brand: KII OPTICAL ACCESS SYSTEM

## (undated) DEVICE — 2, DISPOSABLE SUCTION/IRRIGATOR WITH DISPOSABLE TIP: Brand: STRYKEFLOW

## (undated) DEVICE — SKIN PREP TRAY W/CHG: Brand: MEDLINE INDUSTRIES, INC.

## (undated) DEVICE — KT ORCA ORCAPOD DISP STRL

## (undated) DEVICE — ADAPT CLN BIOGUARD AIR/H2O DISP

## (undated) DEVICE — SUT VIC 0 TIES 18IN J912G

## (undated) DEVICE — ANTIBACTERIAL UNDYED BRAIDED (POLYGLACTIN 910), SYNTHETIC ABSORBABLE SUTURE: Brand: COATED VICRYL